# Patient Record
Sex: FEMALE | Race: WHITE | NOT HISPANIC OR LATINO | Employment: OTHER | URBAN - METROPOLITAN AREA
[De-identification: names, ages, dates, MRNs, and addresses within clinical notes are randomized per-mention and may not be internally consistent; named-entity substitution may affect disease eponyms.]

---

## 2017-11-21 ENCOUNTER — ALLIED HEALTH/NURSE VISIT (OUTPATIENT)
Dept: NURSING | Facility: CLINIC | Age: 67
End: 2017-11-21
Payer: COMMERCIAL

## 2017-11-21 ENCOUNTER — HOSPITAL ENCOUNTER (OUTPATIENT)
Dept: MAMMOGRAPHY | Facility: CLINIC | Age: 67
Discharge: HOME | End: 2017-11-21
Attending: OBSTETRICS & GYNECOLOGY | Admitting: OBSTETRICS & GYNECOLOGY
Payer: MEDICARE

## 2017-11-21 DIAGNOSIS — Z12.39 BREAST CANCER SCREENING: ICD-10-CM

## 2017-11-21 DIAGNOSIS — Z23 NEED FOR PROPHYLACTIC VACCINATION AND INOCULATION AGAINST INFLUENZA: Primary | ICD-10-CM

## 2017-11-21 PROCEDURE — G0008 ADMIN INFLUENZA VIRUS VAC: HCPCS

## 2017-11-21 PROCEDURE — 90662 IIV NO PRSV INCREASED AG IM: CPT

## 2017-11-21 PROCEDURE — 99207 ZZC NO CHARGE NURSE ONLY: CPT

## 2017-11-21 PROCEDURE — G0202 SCR MAMMO BI INCL CAD: HCPCS

## 2017-11-21 NOTE — MR AVS SNAPSHOT
"              After Visit Summary   11/21/2017    Dorie Gagnon    MRN: [id number]           Patient Information     Date Of Birth          1950        Visit Information        Provider Department      11/21/2017 9:30 AM [facility]        Today's Diagnoses     Need for prophylactic vaccination and inoculation against influenza    -  1       Follow-ups after your visit        Your next 10 appointments already scheduled     Nov 21, 2017 11:25 AM CST   MA SCREENING BILATERAL W/ NAREN with [id number]   [facility] [place] ([facility])    [address] [place] [address], [address]  [facility] [id number] [phone]           Three-dimensional (3D) mammograms are available at [place] locations in [facility], [place], [facility], [place], [place], and [place]. [facility] locations include [place] and [facility] in [place]. Benefits of 3D mammograms include: - Improved rate of cancer detection - Decreases your chance of having to go back for more tests, which means fewer: - \"False-positive\" results (This means that there is an abnormal area but it isn't cancer.) - Invasive testing procedures, such as a biopsy or surgery - Can provide clearer images of the breast if you have dense breast tissue. 3D mammography is an optional exam that anyone can have with a 2D mammogram. It doesn't replace or take the place of a 2D mammogram. 2D mammograms remain an effective screening test for all women.  Not all insurance companies cover the cost of a 3D mammogram. Check with your insurance.            Dec 06, 2017 10:30 AM CST   PHYSICAL with Zabrina Lorenzo, DO   [facility]    [address] [place] [id number] [phone]              Who to contact     If you have questions or need follow up information about today's clinic visit or your schedule please contact [facility] directly at [phone].  Normal or non-critical lab and imaging results will be communicated to you by MyChart, letter or phone within 4 business days after the clinic has received the results. If you do not hear from us within 7 days, please contact the clinic through MyChart or phone. If you have a critical or abnormal lab result, we will notify you by phone as soon as possible.  Submit refill requests through Revelation or call your pharmacy and they will forward the refill request to us. Please allow 3 business days for your refill to be completed.          Additional Information About Your Visit        [employer] Information     Revelation gives you secure access to your electronic health record. If you see a primary care provider, you can also send messages to your care team and make appointments. If you have questions, please call your primary care clinic.  If you do not have a primary care provider, please call [phone] and they will assist you.        Care EveryWhere ID     This is your Care EveryWhere ID. This could be used by other organizations to access your [facility] medical records  [id number]         Blood Pressure from Last 3 Encounters:   09/13/16 130/82   06/25/15 120/77   04/07/15 130/80    Weight from Last 3 Encounters:   09/13/16 135 lb (61.2 kg)   06/25/15 140 lb (63.5 kg)   04/07/15 138 lb (62.6 kg)              We Performed the Following     FLU VACCINE, INCREASED ANTIGEN, PRESV FREE, AGE 65+ [74650]     Vaccine Administration, Initial [09966]        Primary Care Provider Office Phone # Fax #    Zabrina Crockett DO [place] [phone] [address]  [place] [address]        Equal Access to Services     [facility] : Hadii aad tracy hadkareno Sodallas, waaxda luqadaha, qaybta kaalmada adelucyada, gerardo hendrix. So [facility] [phone].    ATENCIÓN: Si habla español, tiene a medina disposición servicios gratuitos de asistencia lingüística. Llame al  [phone].    We comply with applicable federal civil rights laws and [place] laws. We do not discriminate on the basis of race, color, national origin, age, disability, sex, sexual orientation, or gender identity.            Thank you!     Thank you for choosing [facility]  for your care. Our goal is always to provide you with excellent care. Hearing back from our patients is one way we can continue to improve our services. Please take a few minutes to complete the written survey that you may receive in the mail after your visit with us. Thank you!             Your Updated Medication List - Protect others around you: Learn how to safely use, store and throw away your medicines at www.disposemymeds.org.          This list is accurate as of: 11/21/17  9:40 AM.  Always use your most recent med list.                   Brand Name Dispense Instructions for use Diagnosis    CO Q 10 PO           KRILL OIL PO      Take 350 mg by mouth        loratadine 10 MG tablet    CLARITIN    90 tablet    Take 1 tablet by mouth daily. April though Oct    Chronic rhinitis       magnesium 100 MG Tabs      Take 400 mg by mouth        melatonin 1 MG Tabs tablet      Take 1 tablet (1 mg) by mouth At Bedtime        vitamin  s/Minerals Tabs     30    1 TABLET DAILY        vitamin B complex with vitamin C Tabs tablet      Take 1 tablet by mouth daily        VITAMIN D3 PO      Take 1,000 Units by mouth daily

## 2017-11-21 NOTE — PROGRESS NOTES

## 2017-12-06 ENCOUNTER — OFFICE VISIT (OUTPATIENT)
Dept: FAMILY MEDICINE | Facility: CLINIC | Age: 67
End: 2017-12-06
Payer: COMMERCIAL

## 2017-12-06 VITALS
OXYGEN SATURATION: 98 % | TEMPERATURE: 97.3 F | HEIGHT: 61 IN | SYSTOLIC BLOOD PRESSURE: 132 MMHG | BODY MASS INDEX: 24.55 KG/M2 | WEIGHT: 130 LBS | HEART RATE: 86 BPM | DIASTOLIC BLOOD PRESSURE: 78 MMHG

## 2017-12-06 DIAGNOSIS — M85.80 OSTEOPENIA, UNSPECIFIED LOCATION: Chronic | ICD-10-CM

## 2017-12-06 DIAGNOSIS — Z00.01 ENCOUNTER FOR PREVENTATIVE ADULT HEALTH CARE EXAM WITH ABNORMAL FINDINGS: Primary | ICD-10-CM

## 2017-12-06 DIAGNOSIS — E78.5 HYPERLIPIDEMIA, UNSPECIFIED HYPERLIPIDEMIA TYPE: Chronic | ICD-10-CM

## 2017-12-06 DIAGNOSIS — R09.89 ABDOMINAL BRUIT: ICD-10-CM

## 2017-12-06 DIAGNOSIS — Z82.49 FAMILY HISTORY OF ISCHEMIC HEART DISEASE: Chronic | ICD-10-CM

## 2017-12-06 DIAGNOSIS — R00.2 PALPITATIONS: ICD-10-CM

## 2017-12-06 DIAGNOSIS — K30 ACID INDIGESTION: ICD-10-CM

## 2017-12-06 PROCEDURE — G0439 PPPS, SUBSEQ VISIT: HCPCS | Performed by: INTERNAL MEDICINE

## 2017-12-06 RX ORDER — LORATADINE 10 MG/1
10 TABLET ORAL PRN
COMMUNITY
End: 2019-04-22

## 2017-12-06 RX ORDER — ANTIARTHRITIC COMBINATION NO.2 900 MG
1 TABLET ORAL DAILY
COMMUNITY
End: 2021-09-19

## 2017-12-06 RX ORDER — MULTIVITAMIN WITH IRON
1 TABLET ORAL DAILY
COMMUNITY
End: 2018-11-19

## 2017-12-06 RX ORDER — ANTIOX #8/OM3/DHA/EPA/LUT/ZEAX 250-2.5 MG
1 CAPSULE ORAL 2 TIMES DAILY
COMMUNITY
End: 2019-11-07

## 2017-12-06 NOTE — PATIENT INSTRUCTIONS
"  Please call to schedule your radiology study at [facility]: [phone] - the aortic ultrasound  Plan on bone density next year with mammogram (call us for the order next fall)  Work on calcium and more exercise for your bones in the meantime  Schedule future fasting labs  Let me know if you wish to have the calcium heart CT scan   Keep track of how often you feel the heart \"irregularity\" and let me know if you'd prefer more information on it with a heart monitor (ex: for a week probably based on time frame of your symptoms)  Try Tums 1-2 tablets twice per day as your extra calcium supplement instead of your current supplement and then see if it helps the indigestion/cough  Vitamin D 2,000 units per day on average  If still having discomfort/indigestion, then can try Zantac or Pepcid  Melatonin a few hours before bedtime up to max 3 mg  Nutrition and MyPlate: Dairy    The dairy group includes foods that are made from milk and are also high in calcium (a nutrient that builds strong bones). If you're lactose-intolerant, special milk products can help. If you're allergic to dairy, be sure to get your calcium from leafy greens (such as mustard or jasmin greens) and from calcium-fortified foods (such as orange juice and soy products).  Nutrient-rich choices   It's best to choose low-fat or nonfat dairy products. Nutrient-rich choices include:    Good old-fashioned milk (low-fat or nonfat). If you don't like the flavor, stir in a little chocolate syrup, or vanilla or almond extract. The nutrients in the milk outweigh the added calories.    Low-fat or nonfat cheese, cottage cheese, and yogurt.    Foods made with these products, such as cream of broccoli soup made with nonfat milk or quesadillas made with low-fat cheese.  What makes dairy less healthy?    Many dairy products are high in fat. Always look for low-fat or nonfat varieties. You can ease into this. If you drink whole milk now, make the move to 2% milk " first, then to fat-free or low-fat (1%) milk.    Most cheeses are high in fat. If you select a cheese with a strong taste, you may eat less than you would of a milder cheese. Also look for low-fat cheese or cheese made with part skim milk.    Added sugar, such as in ice cream and frozen yogurt, makes dairy products less healthy. Compare food labels to find brands lower in fat and calories.  One small change  Drink low-fat or nonfat milk with at least one meal each day. Have a better idea? Write it here:     _____________________________________________________________  Date Last Reviewed: 6/25/2015 2000-2017 [employer]. [address] [place] [address], [place] [address]. All rights reserved. This information is not intended as a substitute for professional medical care. Always follow your healthcare professional's instructions.

## 2017-12-06 NOTE — MR AVS SNAPSHOT
"              After Visit Summary   12/6/2017    Dorie Gagnon    MRN: [id number]           Patient Information     Date Of Birth          1950        Visit Information        Provider Department      12/6/2017 10:30 AM Zabrina Lorenzo,  [facility]        Today's Diagnoses     Encounter for preventative adult health care exam with abnormal findings    -  1    Osteopenia, unspecified location        Hyperlipidemia, unspecified hyperlipidemia type        Chronic rhinitis        Abdominal bruit          Care Instructions      Please call to schedule your radiology study at [facility] [place]: [phone] - the aortic ultrasound  Plan on bone density next year with mammogram (call us for the order next fall)  Work on calcium and more exercise for your bones in the meantime  Schedule future fasting labs  Let me know if you wish to have the calcium heart CT scan   Keep track of how often you feel the heart \"irregularity\" and let me know if you'd prefer more information on it with a heart monitor (ex: for a week probably based on time frame of your symptoms)  Try Tums 1-2 tablets twice per day as your extra calcium supplement instead of your current supplement and then see if it helps the indigestion/cough  Vitamin D 2,000 units per day on average  If still having discomfort/indigestion, then can try Zantac or Pepcid  Melatonin a few hours before bedtime up to max 3 mg  Nutrition and MyPlate: Dairy    The dairy group includes foods that are made from milk and are also high in calcium (a nutrient that builds strong bones). If you're lactose-intolerant, special milk products can help. If you're allergic to dairy, be sure to get your calcium from leafy greens (such as mustard or jasmin greens) and from calcium-fortified foods (such as orange juice and soy products).  Nutrient-rich choices   It's best to choose low-fat or nonfat dairy products. Nutrient-rich choices include:    Good old-fashioned milk " (low-fat or nonfat). If you don't like the flavor, stir in a little chocolate syrup, or vanilla or almond extract. The nutrients in the milk outweigh the added calories.    Low-fat or nonfat cheese, cottage cheese, and yogurt.    Foods made with these products, such as cream of broccoli soup made with nonfat milk or quesadillas made with low-fat cheese.  What makes dairy less healthy?    Many dairy products are high in fat. Always look for low-fat or nonfat varieties. You can ease into this. If you drink whole milk now, make the move to 2% milk first, then to fat-free or low-fat (1%) milk.    Most cheeses are high in fat. If you select a cheese with a strong taste, you may eat less than you would of a milder cheese. Also look for low-fat cheese or cheese made with part skim milk.    Added sugar, such as in ice cream and frozen yogurt, makes dairy products less healthy. Compare food labels to find brands lower in fat and calories.  One small change  Drink low-fat or nonfat milk with at least one meal each day. Have a better idea? Write it here:     _____________________________________________________________  Date Last Reviewed: 6/25/2015 2000-2017 [employer]. [address] [place] [address]. All rights reserved. This information is not intended as a substitute for professional medical care. Always follow your healthcare professional's instructions.                Follow-ups after your visit        Future tests that were ordered for you today     Open Future Orders        Priority Expected Expires Ordered    US abdominal aorta limited Routine  12/6/2018 12/6/2017    CBC with platelets Routine 12/7/2017 6/6/2018 12/6/2017    Comprehensive metabolic panel Routine 12/7/2017 6/6/2018 12/6/2017    Lipid panel reflex to direct LDL Fasting Routine 12/7/2017 6/6/2018 12/6/2017    Vitamin D Deficiency Routine 12/7/2017 6/6/2018 12/6/2017            Who to contact     If you have questions or need  "follow up information about today's clinic visit or your schedule please contact [facility] directly at [phone].  Normal or non-critical lab and imaging results will be communicated to you by [facility], letter or phone within 4 business days after the clinic has received the results. If you do not hear from us within 7 days, please contact the clinic through Fipeot or phone. If you have a critical or abnormal lab result, we will notify you by phone as soon as possible.  Submit refill requests through Leonar3Do or call your pharmacy and they will forward the refill request to us. Please allow 3 business days for your refill to be completed.          Additional Information About Your Visit        [employer] Information     Leonar[employer]Do gives you secure access to your electronic health record. If you see a primary care provider, you can also send messages to your care team and make appointments. If you have questions, please call your primary care clinic.  If you do not have a primary care provider, please call [phone] and they will assist you.        Care EveryWhere ID     This is your Care EveryWhere ID. This could be used by other organizations to access your [facility] medical records  [id number]        Your Vitals Were     Pulse Temperature Height Pulse Oximetry Breastfeeding? BMI (Body Mass Index)    86 97.3  F (36.3  C) (Oral) 5' 1\" (1.549 m) 98% No 24.56 kg/m2       Blood Pressure from Last 3 Encounters:   12/06/17 132/78   09/13/16 130/82   06/25/15 120/77    Weight from Last 3 Encounters:   12/06/17 130 lb (59 kg)   09/13/16 135 lb (61.2 kg)   06/25/15 140 lb (63.5 kg)                 Today's Medication Changes          These changes are accurate as of: 12/6/17 11:32 AM.  If you have any questions, ask your nurse or doctor.               These medicines have changed or have updated prescriptions.        Dose/Directions    loratadine 10 MG tablet   Commonly known as:  CLARITIN   This may have " changed:    - when to take this  - reasons to take this  - additional instructions   Used for:  Chronic rhinitis   Changed by:  Zabrina Lorenzo DO        Dose:  10 mg   Take 1 tablet (10 mg) by mouth as needed for allergies   Refills:  0                Primary Care Provider Office Phone # Fax #    Zabrina Lorenzo [phone] [address] [facility] [address]  [facility] [address]        Equal Access to Services     [facility]: Hadii aad ku hadasho Soomaali, waaxda luqadaha, qaybta kaalmada adeegyada, waxay idiin hayaan adeeg kharash la'aan . So [facility] [phone].    ATENCIÓN: Si katie nicolas, tiene a medina disposición servicios gratuitos de asistencia lingüística. [facility] [phone].    We comply with applicable federal civil rights laws and [place] laws. We do not discriminate on the basis of race, color, national origin, age, disability, sex, sexual orientation, or gender identity.            Thank you!     Thank you for choosing [facility]  for your care. Our goal is always to provide you with excellent care. Hearing back from our patients is one way we can continue to improve our services. Please take a few minutes to complete the written survey that you may receive in the mail after your visit with us. Thank you!             Your Updated Medication List - Protect others around you: Learn how to safely use, store and throw away your medicines at www.disposemymeds.org.          This list is accurate as of: 12/6/17 11:32 AM.  Always use your most recent med list.                   Brand Name Dispense Instructions for use Diagnosis    Biotin 5000 MCG Tabs      Take 1 tablet by mouth daily        Calcium-Vitamin D-Vitamin K 500-1000-40 MG-UNT-MCG Chew      Taking calcium+D gummie once daily        CO Q 10 PO           KRILL OIL PO      Take 350 mg by mouth        loratadine 10 MG tablet    CLARITIN     Take 1 tablet (10 mg) by mouth as needed for allergies    Chronic rhinitis       *  magnesium 100 MG Tabs      Take 400 mg by mouth        * magnesium 250 MG tablet      Take 1 tablet by mouth daily        melatonin 1 MG Tabs tablet      Take 1 tablet (1 mg) by mouth At Bedtime        PROBIOTIC-10 Caps      Take 1 capsule by mouth daily        * PRESERVISION AREDS 2 Caps      Take 1 capsule by mouth daily        * vitamin  s/Minerals Tabs     30    1 TABLET DAILY        vitamin B complex with vitamin C Tabs tablet      Take 1 tablet by mouth daily        VITAMIN D3 PO      Take 1,000 Units by mouth daily        * Notice:  This list has 4 medication(s) that are the same as other medications prescribed for you. Read the directions carefully, and ask your doctor or other care provider to review them with you.

## 2017-12-06 NOTE — PROGRESS NOTES
SUBJECTIVE:   Dorie Gagnon is a 67 year old female who presents for Preventive Visit.  Are you in the first 12 months of your Medicare coverage?  No    Physical   Annual:     Getting at least 3 servings of Calcium per day::  NO    Bi-annual eye exam::  Yes    Dental care twice a year::  Yes    Sleep apnea or symptoms of sleep apnea::  None    Diet::  Low salt    Frequency of exercise::  2-3 days/week    Duration of exercise::  15-30 minutes    Taking medications regularly::  Not Applicable    Medication side effects::  Not applicable    Additional concerns today::  YES      COGNITIVE SCREEN  1) Repeat 3 items (Banana, Sunrise, Chair)    2) Clock draw: NORMAL  3) 3 item recall: Recalls 3 objects  Results: 3 items recalled: COGNITIVE IMPAIRMENT LESS LIKELY    Mini-CogTM Copyright S In[employer]. Licensed by the author for use in [employer]; reprinted with permission (fely@[facility]). All rights reserved.      Dorie still follows with OBGYN Dr. Gil; not as much salad and calcium products but has 2 oral supplements  Walking and yoga ; belongs to a gym and will plan to start back at that   Less eating out and has lost 5# by trying to do so but wants to loose a bit more  Wt Readings from Last 2 Encounters:   12/06/17 130 lb (59 kg)   09/13/16 135 lb (61.2 kg)   Home BPs max 140s/80s; trying to cut back on sodium  Indigestion with occasional cough; prn Pepcid was helpful but doesn't want to be on long-term antiacid   Melatonin at night 2 mg helpful but wonders if ok to take this - I told her that yes it is ok to take  Periodic palpitations as well without associated symptoms            Social History   Substance Use Topics     Smoking status: Never Smoker     Smokeless tobacco: Never Used     Alcohol use Yes      Comment: 1 wine per day       The patient does not drink >3 drinks per day nor >7 drinks per week.      Today's PHQ-2 Score:   PHQ-2 ( 1999 [employer]) 12/6/2017   Q1: Little interest or pleasure in doing  "things 0   Q2: Feeling down, depressed or hopeless 0   PHQ-2 Score 0   Q1: Little interest or pleasure in doing things -   Q2: Feeling down, depressed or hopeless -   PHQ-2 Score -       Do you feel safe in your environment - Yes    Do you have a Health Care Directive?: No: Advance care planning reviewed with patient; information given to patient to review.      Current providers sharing in care for this patient include: Patient Care Team:  Zabrina Lorenzo DO as PCP - General (Internal Medicine)      Hearing impairment: No    Ability to successfully perform activities of daily living: Yes, no assistance needed     Fall risk:  Fallen 2 or more times in the past year?: No  Any fall with injury in the past year?: No      Home safety:  none identified      The following health maintenance items are reviewed in Epic and correct as of today:  Health Maintenance   Topic Date Due     ADVANCE DIRECTIVE PLANNING Q5 YRS  08/16/2016     FALL RISK ASSESSMENT  09/13/2017     PAP Q3 YR  06/25/2018     TETANUS IMMUNIZATION (SYSTEM ASSIGNED)  09/09/2018     MAMMO SCREEN Q2 YR (SYSTEM ASSIGNED)  11/21/2019     COLON CANCER SCREEN (SYSTEM ASSIGNED)  10/05/2020     LIPID SCREEN Q5 YR FEMALE (SYSTEM ASSIGNED)  09/15/2021     INFLUENZA VACCINE (SYSTEM ASSIGNED)  Addressed     DEXA SCAN SCREENING (SYSTEM ASSIGNED)  Completed     PNEUMOCOCCAL  Completed     HEPATITIS C SCREENING  Completed       Review of Systems  Comprehensive ROS negative unless as stated above in HPI.    OBJECTIVE:   /78  Pulse 86  Temp 97.3  F (36.3  C) (Oral)  Ht 5' 1\" (1.549 m)  Wt 130 lb (59 kg)  SpO2 98%  Breastfeeding? No  BMI 24.56 kg/m2 Estimated body mass index is 24.56 kg/(m^2) as calculated from the following:    Height as of this encounter: 5' 1\" (1.549 m).    Weight as of this encounter: 130 lb (59 kg).  Physical Exam  GENERAL APPEARANCE: healthy, alert and no distress  EYES: Eyes grossly normal to inspection, PERRL and conjunctivae and " sclerae normal  HENT: ear canals and TM's normal, nose and mouth without ulcers or lesions, oropharynx clear and oral mucous membranes moist  NECK: no adenopathy, no asymmetry, masses, or scars and thyroid normal to palpation  RESP: lungs clear to auscultation - no rales, rhonchi or wheezes  CV: HRRR with soft 2/6 systolic murmur, no peripheral edema and no carotid bruits  ABDOMEN: soft, nontender, no hepatosplenomegaly, no masses, bowel sounds normal and soft abdominal bruit noted vs radiation of murmur noted  MS: no musculoskeletal defects are noted and gait is age appropriate without ataxia  SKIN: no suspicious lesions or rashes  NEURO: Normal strength and tone, mentation intact and speech normal  PSYCH: mentation appears normal and affect normal/bright    ASSESSMENT / PLAN:   1. Encounter for preventative adult health care exam with abnormal findings  Still follows with OBGYN Dr. Gil  - CBC with platelets; Future    2. Osteopenia, unspecified location  Discussed plan on DEXA next year  See instructions below re: calcium/D and weight bearing exercises  B/c of cardiac disease in family would prefer she get calcium mostly from diet rather than supplements, however, she also has some indigestion so Tums could be helpful  - Vitamin D Deficiency; Future    3. Hyperlipidemia, unspecified hyperlipidemia type  Strong family h/o cardiac disease; normal CRP last year  Was on pravastatin in the past from about 4379-7554 and then switched to diet control  We discussed the coronary calcium CT score and she'd like to consider that  - Comprehensive metabolic panel; Future  - Lipid panel reflex to direct LDL Fasting; Future    4. Abdominal bruit  Bruit vs radiated soft murmur?  - US abdominal aorta limited; Future    5. Family history of ischemic heart disease  Discussed as above    6. Palpitations  She will pay more attention; likely benign rhythm but surely could order a monitor    7. Acid indigestion  See instructions  "below    End of Life Planning:  Patient currently has an advanced directive: No.  I have verified the patient's ablity to prepare an advanced directive/make health care decisions.  Literature was provided to assist patient in preparing an advanced directive.    COUNSELING:  Reviewed preventive health counseling, as reflected in patient instructions       Regular exercise       Healthy diet/nutrition  Estimated body mass index is 24.56 kg/(m^2) as calculated from the following:    Height as of this encounter: 5' 1\" (1.549 m).    Weight as of this encounter: 130 lb (59 kg).   reports that she has never smoked. She has never used smokeless tobacco.        Appropriate preventive services were discussed with this patient, including applicable screening as appropriate for cardiovascular disease, diabetes, osteopenia/osteoporosis, and glaucoma.  As appropriate for age/gender, discussed screening for colorectal cancer, prostate cancer, breast cancer, and cervical cancer. Checklist reviewing preventive services available has been given to the patient.    Reviewed patients plan of care and provided an AVS. The Intermediate Care Plan ( asthma action plan, low back pain action plan, and migraine action plan) for Dorie meets the Care Plan requirement. This Care Plan has been established and reviewed with the Patient.    Patient Instructions     Please call to schedule your radiology study at [facility]: [phone] - the aortic ultrasound  Plan on bone density next year with mammogram (call us for the order next fall)  Work on calcium and more exercise for your bones in the meantime  Schedule future fasting labs  Let me know if you wish to have the calcium heart CT scan   Keep track of how often you feel the heart \"irregularity\" and let me know if you'd prefer more information on it with a heart monitor (ex: for a week probably based on time frame of your symptoms)  Try Tums 1-2 tablets twice per day as your extra " calcium supplement instead of your current supplement and then see if it helps the indigestion/cough  Vitamin D 2,000 units per day on average  If still having discomfort/indigestion, then can try Zantac or Pepcid  Melatonin a few hours before bedtime up to max 3 mg  Nutrition and MyPlate: Dairy    The dairy group includes foods that are made from milk and are also high in calcium (a nutrient that builds strong bones). If you're lactose-intolerant, special milk products can help. If you're allergic to dairy, be sure to get your calcium from leafy greens (such as mustard or jasmin greens) and from calcium-fortified foods (such as orange juice and soy products).  Nutrient-rich choices   It's best to choose low-fat or nonfat dairy products. Nutrient-rich choices include:    Good old-fashioned milk (low-fat or nonfat). If you don't like the flavor, stir in a little chocolate syrup, or vanilla or almond extract. The nutrients in the milk outweigh the added calories.    Low-fat or nonfat cheese, cottage cheese, and yogurt.    Foods made with these products, such as cream of broccoli soup made with nonfat milk or quesadillas made with low-fat cheese.  What makes dairy less healthy?    Many dairy products are high in fat. Always look for low-fat or nonfat varieties. You can ease into this. If you drink whole milk now, make the move to 2% milk first, then to fat-free or low-fat (1%) milk.    Most cheeses are high in fat. If you select a cheese with a strong taste, you may eat less than you would of a milder cheese. Also look for low-fat cheese or cheese made with part skim milk.    Added sugar, such as in ice cream and frozen yogurt, makes dairy products less healthy. Compare food labels to find brands lower in fat and calories.  One small change  Drink low-fat or nonfat milk with at least one meal each day. Have a better idea? Write it here:     _____________________________________________________________  Date Last  Reviewed: 6/25/2015 2000-2017 [employer]. [address] [place] [address], [place] [address]. All rights reserved. This information is not intended as a substitute for professional medical care. Always follow your healthcare professional's instructions.              Zabrina Lorenzo,   [facility]

## 2017-12-06 NOTE — NURSING NOTE
"Chief Complaint   Patient presents with     Wellness Visit       Initial /78  Pulse 86  Temp 97.3  F (36.3  C) (Oral)  Ht 5' 1\" (1.549 m)  Wt 130 lb (59 kg)  SpO2 98%  Breastfeeding? No  BMI 24.56 kg/m2 Estimated body mass index is 24.56 kg/(m^2) as calculated from the following:    Height as of this encounter: 5' 1\" (1.549 m).    Weight as of this encounter: 130 lb (59 kg).  Medication Reconciliation: complete     Thony Colorado CMA     "

## 2017-12-07 DIAGNOSIS — Z00.01 ENCOUNTER FOR PREVENTATIVE ADULT HEALTH CARE EXAM WITH ABNORMAL FINDINGS: ICD-10-CM

## 2017-12-07 DIAGNOSIS — M85.80 OSTEOPENIA, UNSPECIFIED LOCATION: Chronic | ICD-10-CM

## 2017-12-07 DIAGNOSIS — E78.5 HYPERLIPIDEMIA, UNSPECIFIED HYPERLIPIDEMIA TYPE: Chronic | ICD-10-CM

## 2017-12-07 LAB
ALBUMIN SERPL-MCNC: 4.2 G/DL (ref 3.4–5)
ALP SERPL-CCNC: 53 U/L (ref 40–150)
ALT SERPL W P-5'-P-CCNC: 29 U/L (ref 0–50)
ANION GAP SERPL CALCULATED.3IONS-SCNC: 10 MMOL/L (ref 3–14)
AST SERPL W P-5'-P-CCNC: 24 U/L (ref 0–45)
BILIRUB SERPL-MCNC: 0.6 MG/DL (ref 0.2–1.3)
BUN SERPL-MCNC: 12 MG/DL (ref 7–30)
CALCIUM SERPL-MCNC: 9.3 MG/DL (ref 8.5–10.1)
CHLORIDE SERPL-SCNC: 103 MMOL/L (ref 94–109)
CHOLEST SERPL-MCNC: 275 MG/DL
CO2 SERPL-SCNC: 24 MMOL/L (ref 20–32)
CREAT SERPL-MCNC: 0.72 MG/DL (ref 0.52–1.04)
DEPRECATED CALCIDIOL+CALCIFEROL SERPL-MC: 70 UG/L (ref 20–75)
ERYTHROCYTE [DISTWIDTH] IN BLOOD BY AUTOMATED COUNT: 13.8 % (ref 10–15)
GFR SERPL CREATININE-BSD FRML MDRD: 81 ML/MIN/1.7M2
GLUCOSE SERPL-MCNC: 93 MG/DL (ref 70–99)
HCT VFR BLD AUTO: 39 % (ref 35–47)
HDLC SERPL-MCNC: 84 MG/DL
HGB BLD-MCNC: 13.3 G/DL (ref 11.7–15.7)
LDLC SERPL CALC-MCNC: 173 MG/DL
MCH RBC QN AUTO: 31.3 PG (ref 26.5–33)
MCHC RBC AUTO-ENTMCNC: 34.1 G/DL (ref 31.5–36.5)
MCV RBC AUTO: 92 FL (ref 78–100)
NONHDLC SERPL-MCNC: 191 MG/DL
PLATELET # BLD AUTO: 292 10E9/L (ref 150–450)
POTASSIUM SERPL-SCNC: 4 MMOL/L (ref 3.4–5.3)
PROT SERPL-MCNC: 7.5 G/DL (ref 6.8–8.8)
RBC # BLD AUTO: 4.25 10E12/L (ref 3.8–5.2)
SODIUM SERPL-SCNC: 137 MMOL/L (ref 133–144)
TRIGL SERPL-MCNC: 92 MG/DL
WBC # BLD AUTO: 5 10E9/L (ref 4–11)

## 2017-12-07 PROCEDURE — 85027 COMPLETE CBC AUTOMATED: CPT | Performed by: INTERNAL MEDICINE

## 2017-12-07 PROCEDURE — 80061 LIPID PANEL: CPT | Performed by: INTERNAL MEDICINE

## 2017-12-07 PROCEDURE — 36415 COLL VENOUS BLD VENIPUNCTURE: CPT | Performed by: INTERNAL MEDICINE

## 2017-12-07 PROCEDURE — 82306 VITAMIN D 25 HYDROXY: CPT | Performed by: INTERNAL MEDICINE

## 2017-12-07 PROCEDURE — 80053 COMPREHEN METABOLIC PANEL: CPT | Performed by: INTERNAL MEDICINE

## 2017-12-08 ENCOUNTER — HOSPITAL ENCOUNTER (OUTPATIENT)
Dept: ULTRASOUND IMAGING | Facility: CLINIC | Age: 67
Discharge: HOME | End: 2017-12-08
Attending: INTERNAL MEDICINE | Admitting: INTERNAL MEDICINE
Payer: MEDICARE

## 2017-12-08 DIAGNOSIS — R09.89 ABDOMINAL BRUIT: ICD-10-CM

## 2017-12-08 PROBLEM — R00.2 PALPITATIONS: Status: ACTIVE | Noted: 2017-12-08

## 2017-12-08 PROBLEM — K30 ACID INDIGESTION: Status: ACTIVE | Noted: 2017-12-08

## 2017-12-08 PROCEDURE — 76775 US EXAM ABDO BACK WALL LIM: CPT

## 2018-04-13 ENCOUNTER — MYC MEDICAL ADVICE (OUTPATIENT)
Dept: FAMILY MEDICINE | Facility: CLINIC | Age: 68
End: 2018-04-13

## 2018-04-13 DIAGNOSIS — E78.5 HYPERLIPIDEMIA, UNSPECIFIED HYPERLIPIDEMIA TYPE: Primary | Chronic | ICD-10-CM

## 2018-04-13 NOTE — TELEPHONE ENCOUNTER
Dr. Lorenzo,     Please see message below from patient regarding statin medication.    Lucy Lovett RN

## 2018-04-15 RX ORDER — PRAVASTATIN SODIUM 40 MG
40 TABLET ORAL DAILY
Qty: 90 TABLET | Refills: 1 | Status: SHIPPED | OUTPATIENT
Start: 2018-04-15 | End: 2018-10-23

## 2018-08-25 ENCOUNTER — HEALTH MAINTENANCE LETTER (OUTPATIENT)
Age: 68
End: 2018-08-25

## 2018-10-23 DIAGNOSIS — E78.5 HYPERLIPIDEMIA, UNSPECIFIED HYPERLIPIDEMIA TYPE: Chronic | ICD-10-CM

## 2018-10-23 NOTE — TELEPHONE ENCOUNTER
"Last Written Prescription Date:  4/15/18  Last Fill Quantity: 90,  # refills: 1   Last office visit: 12/6/2017 with prescribing provider:     Future Office Visit:   Next 5 appointments (look out 90 days)     Nov 19, 2018  1:00 PM [facility] Physical Adult with Zabrina Lorenzo,    [facility]    [address] [facility] [address]   [phone]                 Requested Prescriptions   Pending Prescriptions Disp Refills     pravastatin (PRAVACHOL) 40 MG tablet [Pharmacy Med Name: PRAVASTATIN 40MG TABLETS] 90 tablet 0     Sig: TAKE 1 TABLET(40 MG) BY MOUTH DAILY    Statins Protocol Passed    10/23/2018  1:05 PM       Passed - LDL on file in past 12 months    Recent Labs   Lab Test  12/07/17   0854   LDL  173*            Passed - No abnormal creatine kinase in past 12 months    No lab results found.            Passed - Recent (12 mo) or future (30 days) visit within the authorizing provider's specialty    Patient had office visit in the last 12 months or has a visit in the next 30 days with authorizing provider or within the authorizing provider's specialty.  See \"Patient Info\" tab in inbasket, or \"Choose Columns\" in Meds & Orders section of the refill encounter.             Passed - Patient is age 18 or older       Passed - No active pregnancy on record       Passed - No positive pregnancy test in past 12 months          "

## 2018-10-24 RX ORDER — PRAVASTATIN SODIUM 40 MG
TABLET ORAL
Qty: 90 TABLET | Refills: 0 | Status: SHIPPED | OUTPATIENT
Start: 2018-10-24 | End: 2018-11-29

## 2018-10-24 NOTE — TELEPHONE ENCOUNTER
Pres[place] per [facility] [place].      Caren Oliva, BS, RN, PHN

## 2018-11-09 ENCOUNTER — MYC MEDICAL ADVICE (OUTPATIENT)
Dept: FAMILY MEDICINE | Facility: CLINIC | Age: 68
End: 2018-11-09

## 2018-11-09 DIAGNOSIS — Z78.0 ASYMPTOMATIC MENOPAUSAL STATE: ICD-10-CM

## 2018-11-09 DIAGNOSIS — M85.80 OSTEOPENIA, UNSPECIFIED LOCATION: Primary | Chronic | ICD-10-CM

## 2018-11-19 ENCOUNTER — OFFICE VISIT (OUTPATIENT)
Dept: FAMILY MEDICINE | Facility: CLINIC | Age: 68
End: 2018-11-19
Payer: COMMERCIAL

## 2018-11-19 ENCOUNTER — HOSPITAL ENCOUNTER (OUTPATIENT)
Dept: BONE DENSITY | Facility: CLINIC | Age: 68
End: 2018-11-19
Attending: INTERNAL MEDICINE
Payer: MEDICARE

## 2018-11-19 ENCOUNTER — HOSPITAL ENCOUNTER (OUTPATIENT)
Dept: MAMMOGRAPHY | Facility: CLINIC | Age: 68
Discharge: HOME | End: 2018-11-19
Attending: INTERNAL MEDICINE | Admitting: INTERNAL MEDICINE
Payer: MEDICARE

## 2018-11-19 VITALS
DIASTOLIC BLOOD PRESSURE: 72 MMHG | HEIGHT: 61 IN | TEMPERATURE: 97.3 F | HEART RATE: 76 BPM | OXYGEN SATURATION: 97 % | WEIGHT: 120.2 LBS | SYSTOLIC BLOOD PRESSURE: 134 MMHG | BODY MASS INDEX: 22.69 KG/M2

## 2018-11-19 DIAGNOSIS — M85.80 OSTEOPENIA, UNSPECIFIED LOCATION: Chronic | ICD-10-CM

## 2018-11-19 DIAGNOSIS — M19.90 ARTHRITIS: ICD-10-CM

## 2018-11-19 DIAGNOSIS — Z82.49 FAMILY HISTORY OF ISCHEMIC HEART DISEASE: Chronic | ICD-10-CM

## 2018-11-19 DIAGNOSIS — Z12.31 VISIT FOR SCREENING MAMMOGRAM: ICD-10-CM

## 2018-11-19 DIAGNOSIS — Z78.0 ASYMPTOMATIC MENOPAUSAL STATE: ICD-10-CM

## 2018-11-19 DIAGNOSIS — Z00.00 MEDICARE ANNUAL WELLNESS VISIT, SUBSEQUENT: Primary | ICD-10-CM

## 2018-11-19 DIAGNOSIS — E78.5 HYPERLIPIDEMIA, UNSPECIFIED HYPERLIPIDEMIA TYPE: Chronic | ICD-10-CM

## 2018-11-19 DIAGNOSIS — R00.2 PALPITATIONS: ICD-10-CM

## 2018-11-19 PROCEDURE — 99397 PER PM REEVAL EST PAT 65+ YR: CPT | Performed by: INTERNAL MEDICINE

## 2018-11-19 PROCEDURE — 77085 DXA BONE DENSITY AXL VRT FX: CPT

## 2018-11-19 PROCEDURE — 77067 SCR MAMMO BI INCL CAD: CPT

## 2018-11-19 PROCEDURE — 99214 OFFICE O/P EST MOD 30 MIN: CPT | Mod: 25 | Performed by: INTERNAL MEDICINE

## 2018-11-19 RX ORDER — PRAVASTATIN SODIUM 40 MG
TABLET ORAL
Qty: 90 TABLET | Refills: 3 | Status: CANCELLED | OUTPATIENT
Start: 2018-11-19

## 2018-11-19 ASSESSMENT — ACTIVITIES OF DAILY LIVING (ADL): CURRENT_FUNCTION: NO ASSISTANCE NEEDED

## 2018-11-19 NOTE — PATIENT INSTRUCTIONS
Schedule future fasting labs    Mammogram and bone density today    Restart home exercises to see if helps hips    Trial of any brand glucosamine-chondroitin to see if helps joints    Let me know if want formal physical therapy     Schedule heart monitor     Schedule with podiatrist in our clinic regarding left sided bunion and hammer toe    Goal blood pressure <130/80 overall    Shingrix  is:  1) Recommended for healthy adults aged 50 years and older to help prevent shingles and its related complications such as pain related to the shingles virus  2) Recommended for adults who previously received the previous shingles vaccine (Zostavax )  3) The preferred vaccine for helping to prevent shingles and its related complications  4) It is a series of TWO vaccines with the second dose administered between 2-6 months after the first dose in this series  5) PLEASE CHECK COST WITH YOUR INSURANCE COMPANY OR YOUR LOCAL PHARMACY AS EACH DOSE MAY BE AS MUCH AS APPROXIMATELY $300 IF NOT COVERED BY INSURANCE      Preventive Health Recommendations  See your health care provider every year to    Review health changes.     Discuss preventive care.      Review your medicines if your doctor has prescribed any.      You no longer need a yearly Pap test unless you've had an abnormal Pap test in the past 10 years. If you have vaginal symptoms, such as bleeding or discharge, be sure to talk with your provider about a Pap test.      Every 1 to 2 years, have a mammogram.  If you are over 69, talk with your health care provider about whether or not you want to continue having screening mammograms.      Every 10 years, have a colonoscopy. Or, have a yearly FIT test (stool test). These exams will check for colon cancer.       Have a cholesterol test every 5 years, or more often if your doctor advises it.       Have a diabetes test (fasting glucose) every three years. If you are at risk for diabetes, you should have this test more often.        At age 65, have a bone density scan (DEXA) to check for osteoporosis (brittle bone disease).    Shots:    Get a flu shot each year.    Get a tetanus shot every 10 years.    Talk to your doctor about your pneumonia vaccines. There are now two you should receive - Pneumovax (PPSV 23) and Prevnar (PCV 13).    Talk to your pharmacist about the shingles vaccine.    Talk to your doctor about the hepatitis B vaccine.    Nutrition:     Eat at least 5 servings of fruits and vegetables each day.      Eat whole-grain bread, whole-wheat pasta and brown rice instead of white grains and rice.      Get adequate Calcium and Vitamin D.     Lifestyle    Exercise at least 150 minutes a week (30 minutes a day, 5 days a week). This will help you control your weight and prevent disease.      Limit alcohol to one drink per day.      No smoking.       Wear sunscreen to prevent skin cancer.       See your dentist twice a year for an exam and cleaning.      See your eye doctor every 1 to 2 years to screen for conditions such as glaucoma, macular degeneration and cataracts.    Personalized Prevention Plan  You are due for the preventive services outlined below.  Your care team is available to assist you in scheduling these services.  If you have already completed any of these items, please share that information with your care team to update in your medical record.  Health Maintenance Due   Topic Date Due     Discuss Advance Directive Planning  08/16/2016     Pap Smear - every 3 years  06/25/2018     Flu Vaccine (1) 09/01/2018     Tetanus Vaccine - every 10 years  09/09/2018     FALL RISK ASSESSMENT  12/06/2018     Depression Assessment 2 - yearly  12/06/2018

## 2018-11-19 NOTE — PROGRESS NOTES
"    SUBJECTIVE:   Dorie Gagnon is a 68 year old female who presents for Preventive Visit.  Are you in the first 12 months of your Medicare Part B coverage?  No    Physical Health:  In general, how would you rate your overall physical health?: good  Frequency of exercise:: 2-3 days/week --> some home exercises, yoga, walking in the summer months  Do you usually eat at least 4 servings of fruit and vegetables a day, include whole grains & fiber, and avoid regularly eating high fat or \"junk\" foods? : Yes  Taking medications regularly:: Yes  Medication side effects:: Muscle aches (minor)  Activities of Daily Living: no assistance needed  Home safety: no safety concerns identified  Hearing Impairment:: no hearing concerns  In the past 6 months, have you been bothered by leaking of urine?: No  In general, how would you rate your overall mental or emotional health?: excellent  Additional concerns today:: Yes  PHQ-2 Score: 0  Duration of exercise:: 15-30 minutes      Additional concerns to address?  Remains on Pravastatin for cholesterol. Does see OBGYN Dr. Gil. Will be having DEXA and mammogram later today. Still having daily palpitations lasting about 30 seconds and \"feels fast and like I miss a beat\". Strong cardiac history in the family. Not sure if she has any associated symptoms. Has arthritis in hips (mild per pelvic xray in 2015) and now some pain in right MCPs and CMC. She wonders about RA and OA. Has pain in lateral hips and groin. Did go to physical therapy a few years ago for her hips and wants to retry that home therapy first before revisiting formal outpatient physical therapy. Hasn't taken over-the-counter medications for hands regularly but sometimes takes ES Tylenol. Has left foot bunion and 2nd hammer toe. S/p bunionectomy and hammer toe surgery on right in the past. Melatonin does help. Feels better with Weight Watcher's!!!!      Fall risk:      Fallen 2 or more times in the past year?: No  Any fall " "with injury in the past year?: No      COGNITIVE SCREEN  1) Repeat 3 items (Leader, Season, Table)    2) Clock draw: NORMAL  3) 3 item recall: Recalls 3 objects  Results: 3 items recalled: COGNITIVE IMPAIRMENT LESS LIKELY    Mini-CogTM Copyright S Ines. Licensed by the author for use in [facility]; reprinted with permission (fely@[facility]). All rights reserved.          Meds        Social History   Substance Use Topics     Smoking status: Never Smoker     Smokeless tobacco: Never Used     Alcohol use Yes      Comment: 1 wine per day                             Do you feel safe in your environment - Yes    Do you have a Health Care Directive?: No: Advance care planning reviewed with patient; information given to patient to review.    Current providers sharing in care for this patient include:   Patient Care Team:  Zabrina Lorenzo DO as PCP - General (Internal Medicine)    The following health maintenance items are reviewed in Epic and correct as of today:  Health Maintenance   Topic Date Due     ADVANCE DIRECTIVE PLANNING Q5 YRS  08/16/2016     PAP Q3 YR  06/25/2018     FALL RISK ASSESSMENT  12/06/2018     PHQ-2 Q1 YR  11/19/2019     MAMMO SCREEN Q2 YR (SYSTEM ASSIGNED)  11/21/2019     COLON CANCER SCREEN (SYSTEM ASSIGNED)  10/05/2020     LIPID SCREEN Q5 YR FEMALE (SYSTEM ASSIGNED)  12/07/2022     TETANUS IMMUNIZATION (SYSTEM ASSIGNED)  01/18/2028     DEXA SCAN SCREENING (SYSTEM ASSIGNED)  Completed     PNEUMOCOCCAL  Completed     INFLUENZA VACCINE  Completed     HEPATITIS C SCREENING  Completed       ROS:  Comprehensive ROS negative unless as stated above in HPI.     OBJECTIVE:   /72  Pulse 76  Temp 97.3  F (36.3  C) (Oral)  Ht 5' 1\" (1.549 m)  Wt 120 lb 3.2 oz (54.5 kg)  SpO2 97%  BMI 22.71 kg/m2 Estimated body mass index is 22.71 kg/(m^2) as calculated from the following:    Height as of this encounter: 5' 1\" (1.549 m).    Weight as of this encounter: 120 lb 3.2 oz (54.5 kg).  EXAM: "   GENERAL APPEARANCE: healthy, alert and no distress  EYES: Eyes grossly normal to inspection, PERRL and conjunctivae and sclerae normal  HENT: ear canals and TM's normal, nose and mouth without ulcers or lesions, oropharynx clear and oral mucous membranes moist  NECK: no adenopathy, no asymmetry, masses, or scars and thyroid normal to palpation  RESP: lungs clear to auscultation - no rales, rhonchi or wheezes  BREAST: normal without masses, tenderness or nipple discharge and no palpable axillary masses or adenopathy  CV: regular rate and rhythm, normal S1 S2, no S3 or S4, no murmur, click or rub, no peripheral edema and no carotid bruits  ABDOMEN: soft, nontender, no hepatosplenomegaly, no masses and bowel sounds normal, no abdominal bruits  MS: minor right CMC arthritis and right 2nd and 3rd finger bony hypertrophy, Left great toe bunion with left 2nd hammer toe, hips move well on int/ext ROM  SKIN: no suspicious lesions or rashes; some SKs on back  NEURO: Normal strength and tone, mentation intact and speech normal  PSYCH: mentation appears normal and affect normal/bright    Wt Readings from Last 4 Encounters:   11/19/18 120 lb 3.2 oz (54.5 kg)   12/06/17 130 lb (59 kg)   09/13/16 135 lb (61.2 kg)   06/25/15 140 lb (63.5 kg)     ASSESSMENT / PLAN:   1. Medicare annual wellness visit, subsequent  Still follows with OBGYN Dr. Gil  Come back for fasting labs, mammo, dexa    2. Hyperlipidemia, unspecified hyperlipidemia type  Strong family h/o CAD; tolerating Pravastatin again  - Comprehensive metabolic panel; Future  - Lipid panel reflex to direct LDL Fasting; Future  - CK total; Future  - pravastatin (PRAVACHOL) 40 MG tablet; TAKE 1 TABLET(40 MG) BY MOUTH DAILY  Dispense: 90 tablet; Refill: 3    3. Family history of ischemic heart disease  As noted above; she has done a great job on diet/exercise this year with weight loss and feels great  Baseline normal stress test in 2010  Wt Readings from Last 4 Encounters:  "  11/19/18 54.5 kg (120 lb 3.2 oz)   12/06/17 59 kg (130 lb)   09/13/16 61.2 kg (135 lb)   06/25/15 63.5 kg (140 lb)       4. Palpitations  ADDENDUM: Zio showed as anticipated, benign infrequent PVCs  - Hemoglobin; Future  - Zio Patch Holter; Future    5. Osteopenia, unspecified location  Due for dexa  - Vitamin D Deficiency; Future    6. Arthritis  Labs to be completed though suspect more OA than RA  Home exercises discussed  - Rheumatoid factor; Future  - Cyclic Citrullinated Peptide Antibody IgG; Future    End of Life Planning:  Patient currently has an advanced directive: No.  I have verified the patient's ablity to prepare an advanced directive/make health care decisions.  Literature was provided to assist patient in preparing an advanced directive.    COUNSELING:  Reviewed preventive health counseling, as reflected in patient instructions       Regular exercise       Healthy diet/nutrition    BP Readings from Last 1 Encounters:   11/19/18 134/72     Estimated body mass index is 22.71 kg/(m^2) as calculated from the following:    Height as of this encounter: 5' 1\" (1.549 m).    Weight as of this encounter: 120 lb 3.2 oz (54.5 kg).     reports that she has never smoked. She has never used smokeless tobacco.      Appropriate preventive services were discussed with this patient, including applicable screening as appropriate for cardiovascular disease, diabetes, osteopenia/osteoporosis, and glaucoma.  As appropriate for age/gender, discussed screening for colorectal cancer, prostate cancer, breast cancer, and cervical cancer. Checklist reviewing preventive services available has been given to the patient.    Reviewed patients plan of care and provided an AVS. The Intermediate Care Plan ( asthma action plan, low back pain action plan, and migraine action plan) for Dorie meets the Care Plan requirement. This Care Plan has been established and reviewed with the Patient.    Patient Instructions     Schedule future " fasting labs    Mammogram and bone density today    Restart home exercises to see if helps hips    Trial of any brand glucosamine-chondroitin to see if helps joints    Let me know if want formal physical therapy     Schedule heart monitor     Schedule with podiatrist in our clinic regarding left sided bunion and hammer toe    Goal blood pressure <130/80 overall    Shingrix  is:  1) Recommended for healthy adults aged 50 years and older to help prevent shingles and its related complications such as pain related to the shingles virus  2) Recommended for adults who previously received the previous shingles vaccine (Zostavax )  3) The preferred vaccine for helping to prevent shingles and its related complications  4) It is a series of TWO vaccines with the second dose administered between 2-6 months after the first dose in this series  5) PLEASE CHECK COST WITH YOUR INSURANCE COMPANY OR YOUR LOCAL PHARMACY AS EACH DOSE MAY BE AS MUCH AS APPROXIMATELY $300 IF NOT COVERED BY INSURANCE      MDM: Additional time outside of preventative health care spent discussing recurrent palpitations and joint pains with evaluation noted above.    Zabrina Lorenzo,   [facility]

## 2018-11-19 NOTE — MR AVS SNAPSHOT
After Visit Summary   11/19/2018    Dorie Gagnon    MRN: [id number]           Patient Information     Date Of Birth          1950        Visit Information        Provider Department      11/19/2018 1:00 PM Zabrina Lorenzo,  [facility]        Today's Diagnoses     Medicare annual wellness visit, subsequent    -  1    Hyperlipidemia, unspecified hyperlipidemia type        Family history of ischemic heart disease        Palpitations        Osteopenia, unspecified location        Arthritis          Care Instructions    Schedule future fasting labs    Mammogram and bone density today    Restart home exercises to see if helps hips    Trial of any brand glucosamine-chondroitin to see if helps joints    Let me know if want formal physical therapy     Schedule heart monitor     Schedule with podiatrist in our clinic regarding left sided bunion and hammer toe    Goal blood pressure <130/80 overall    Shingrix  is:  1) Recommended for healthy adults aged 50 years and older to help prevent shingles and its related complications such as pain related to the shingles virus  2) Recommended for adults who previously received the previous shingles vaccine (Zostavax )  3) The preferred vaccine for helping to prevent shingles and its related complications  4) It is a series of TWO vaccines with the second dose administered between 2-6 months after the first dose in this series  5) PLEASE CHECK COST WITH YOUR INSURANCE COMPANY OR YOUR LOCAL PHARMACY AS EACH DOSE MAY BE AS MUCH AS APPROXIMATELY $300 IF NOT COVERED BY INSURANCE      Preventive Health Recommendations  See your health care provider every year to    Review health changes.     Discuss preventive care.      Review your medicines if your doctor has prescribed any.      You no longer need a yearly Pap test unless you've had an abnormal Pap test in the past 10 years. If you have vaginal symptoms, such as bleeding or discharge, be sure to  talk with your provider about a Pap test.      Every 1 to 2 years, have a mammogram.  If you are over 69, talk with your health care provider about whether or not you want to continue having screening mammograms.      Every 10 years, have a colonoscopy. Or, have a yearly FIT test (stool test). These exams will check for colon cancer.       Have a cholesterol test every 5 years, or more often if your doctor advises it.       Have a diabetes test (fasting glucose) every three years. If you are at risk for diabetes, you should have this test more often.       At age 65, have a bone density scan (DEXA) to check for osteoporosis (brittle bone disease).    Shots:    Get a flu shot each year.    Get a tetanus shot every 10 years.    Talk to your doctor about your pneumonia vaccines. There are now two you should receive - Pneumovax (PPSV 23) and Prevnar (PCV 13).    Talk to your pharmacist about the shingles vaccine.    Talk to your doctor about the hepatitis B vaccine.    Nutrition:     Eat at least 5 servings of fruits and vegetables each day.      Eat whole-grain bread, whole-wheat pasta and brown rice instead of white grains and rice.      Get adequate Calcium and Vitamin D.     Lifestyle    Exercise at least 150 minutes a week (30 minutes a day, 5 days a week). This will help you control your weight and prevent disease.      Limit alcohol to one drink per day.      No smoking.       Wear sunscreen to prevent skin cancer.       See your dentist twice a year for an exam and cleaning.      See your eye doctor every 1 to 2 years to screen for conditions such as glaucoma, macular degeneration and cataracts.    Personalized Prevention Plan  You are due for the preventive services outlined below.  Your care team is available to assist you in scheduling these services.  If you have already completed any of these items, please share that information with your care team to update in your medical record.  Health Maintenance Due    Topic Date Due     Discuss Advance Directive Planning  08/16/2016     Pap Smear - every 3 years  06/25/2018     Flu Vaccine (1) 09/01/2018     Tetanus Vaccine - every 10 years  09/09/2018     FALL RISK ASSESSMENT  12/06/2018     Depression Assessment 2 - yearly  12/06/2018             Follow-ups after your visit        Your next 10 appointments already scheduled     Nov 19, 2018  2:30 PM CST   MA SCREENING BILATERAL W/ NAREN with [id number]   [facility] ([facility])    [address] [place] [address], [address] [place] [id number] [phone]           How do I prepare for my exam? (Food and drink instructions) No Food and Drink Restrictions.  How do I prepare for my exam? (Other instructions) Do not use any powder, lotion or deodorant under your arms or on your breast. If you do, we will ask you to remove it before your exam.  What should I wear: Wear comfortable, two-piece clothing.  How long does the exam take: Most scans will take 15 minutes.  What should I bring: Bring any previous mammograms from other facilities or have them mailed to the breast center.  Do I need a :  No  is needed.  What do I need to tell my doctor: If you have any allergies, tell your care team.  What should I do after the exam: No restrictions, You may resume normal activities.  What is this test: This test is an x-ray of the breast to look for breast disease. The breast is pressed between two plates to flatten and spread the tissue. An X-ray is taken of the breast from different angles.  Who should I call with questions: If you have any questions, please call the Imaging Department where you will have your exam. Directions, parking instructions, and other information is available on our website, [email or web address].  Other information about my exam Three-dimensional (3D) mammograms are available at [facility] locations in [facility], [place], [place], [facility], [place], and  "[place].  Health locations include [place] and the [facility] in [place].  Benefits of 3D mammograms include: * Improved rate of cancer detection * Decreases your chance of having to go back for more tests, which means fewer: * \"False-positive\" results (This means that there is an abnormal area but it isn't cancer.) * Invasive testing procedures, such as a biopsy or surgery * Can provide clearer images of the breast if you have dense breast tissue.  *3D mammography is an optional exam that anyone can have with a 2D mammogram. It doesn't replace or take the place of a 2D mammogram. 2D mammograms remain an effective screening test for all women.  Not all insurance companies cover the cost of a 3D mammogram. Check with your insurance.            Nov 19, 2018  2:45 PM CST   DX HIP/PELVIS/SPINE with [id number]   [facility] ([facility] [place])    [address] [place] [id number] [phone]           How do I prepare for my exam? (Food and drink instructions) No Food and Drink Restrictions.  How do I prepare for my exam? (Other instructions) Please do not take any of the following 24 hours prior to the day of your exam: vitamins, calcium tablets, antacids.  What should I wear: If possible, please wear clothes without metal (snaps, zippers). A sweat suit works well.  How long does the exam take: The exam takes about 20 minutes.  What should I bring: Bring a list of your current medicines to your exam (including vitamins, minerals and over-the-counter drugs).  Do I need a :  No  is needed.  What should I do after the exam: No restrictions, You may resume normal activities.  How do I prepare for my exam? (Food and drink instructions) A DEXA scan is a bone-density scan. It uses a low level of radiation to check the strength of your bones. As you lie on a padded table, a machine will take X-rays. We most often scan the hips and lower spine.  " Who should I call with questions: If you have any questions, please call the Imaging Department where you will have your exam. Directions, parking instructions, and other information is available on our website, [email or web address].              Future tests that were ordered for you today     Open Future Orders        Priority Expected Expires Ordered    Comprehensive metabolic panel Routine 11/20/2018 1/19/2019 11/19/2018    Lipid panel reflex to direct LDL Fasting Routine 11/20/2018 1/19/2019 11/19/2018    Hemoglobin Routine 11/20/2018 1/19/2019 11/19/2018    Vitamin D Deficiency Routine 11/20/2018 1/19/2019 11/19/2018    CK total Routine 11/20/2018 1/19/2019 11/19/2018    Zio Patch Holter Routine  1/3/2019 11/19/2018    Rheumatoid factor Routine 11/20/2018 1/19/2019 11/19/2018    Cyclic Citrullinated Peptide Antibody IgG Routine 11/20/2018 1/19/2019 11/19/2018            Who to contact     If you have questions or need follow up information about today's clinic visit or your schedule please contact [facility] directly at [phone].  Normal or non-critical lab and imaging results will be communicated to you by MyChart, letter or phone within 4 business days after the clinic has received the results. If you do not hear from us within 7 days, please contact the clinic through [facility] or phone. If you have a critical or abnormal lab result, we will notify you by phone as soon as possible.  Submit refill requests through [employer] or call your pharmacy and they will forward the refill request to us. Please allow 3 business days for your refill to be completed.          Additional Information About Your Visit        MyChart Information     [employer] gives you secure access to your electronic health record. If you see a primary care provider, you can also send messages to your care team and make appointments. If you have questions, please call your primary care clinic.  If you do not have a primary care  "provider, please call [phone] and they will assist you.        Care EveryWhere ID     This is your Care EveryWhere ID. This could be used by other organizations to access your [facility] medical records  [id number]        Your Vitals Were     Pulse Temperature Height Pulse Oximetry BMI (Body Mass Index)       76 97.3  F (36.3  C) (Oral) 5' 1\" (1.549 m) 97% 22.71 kg/m2        Blood Pressure from Last 3 Encounters:   11/19/18 134/72   12/06/17 132/78   09/13/16 130/82    Weight from Last 3 Encounters:   11/19/18 120 lb 3.2 oz (54.5 kg)   12/06/17 130 lb (59 kg)   09/13/16 135 lb (61.2 kg)                 Today's Medication Changes          These changes are accurate as of 11/19/18  1:58 PM.  If you have any questions, ask your nurse or doctor.               These medicines have changed or have updated prescriptions.        Dose/Directions    * PRESERVISION AREDS 2 Caps   This may have changed:  Another medication with the same name was changed. Make sure you understand how and when to take each.        Dose:  2 capsule   Take 2 capsules by mouth daily   Refills:  0       * vitamin  s/Minerals Tabs   This may have changed:  See the new instructions.        1 TABLET DAILY   Quantity:  30   Refills:  0       * Notice:  This list has 2 medication(s) that are the same as other medications prescribed for you. Read the directions carefully, and ask your doctor or other care provider to review them with you.      Stop taking these medicines if you haven't already. Please contact your care team if you have questions.     magnesium 100 MG Tabs   Stopped by:  Zabrina Lorenzo DO                    Primary Care Provider Office Phone # Fax #    Zabrina Lorenzo [phone] [address]  [place] [address]        Equal Access to Services     PRANAV BELLO AH: Hadii elizabeth ku hadasho Soomaali, waaxda luqadaha, qaybta kaalmada adeegyada, waxay zion hendrix. So wac " [phone].    ATENCIÓN: Si katie nicolas, tiene a medina disposición servicios gratuitos de asistencia lingüística. Sorin knox [phone].    We comply with applicable federal civil rights laws and [place] laws. We do not discriminate on the basis of race, color, national origin, age, disability, sex, sexual orientation, or gender identity.            Thank you!     Thank you for choosing [facility]  for your care. Our goal is always to provide you with excellent care. Hearing back from our patients is one way we can continue to improve our services. Please take a few minutes to complete the written survey that you may receive in the mail after your visit with us. Thank you!             Your Updated Medication List - Protect others around you: Learn how to safely use, store and throw away your medicines at www.disposemymeds.org.          This list is accurate as of 11/19/18  1:58 PM.  Always use your most recent med list.                   Brand Name Dispense Instructions for use Diagnosis    Biotin 5000 MCG Tabs      Take 1 tablet by mouth daily        Calcium-Vitamin D-Vitamin K 500-1000-40 MG-UNT-MCG Chew      Taking calcium+D gummie once daily        CO Q 10 PO           KRILL OIL PO      Take 350 mg by mouth        loratadine 10 MG tablet    CLARITIN     Take 1 tablet (10 mg) by mouth as needed for allergies        MAGNESIUM OXIDE PO      Take 400 mg by mouth daily        melatonin 1 MG Tabs tablet      Take 2 mg by mouth At Bedtime        pravastatin 40 MG tablet    PRAVACHOL    90 tablet    TAKE 1 TABLET(40 MG) BY MOUTH DAILY    Hyperlipidemia, unspecified hyperlipidemia type       PROBIOTIC-10 Caps      Take 1 capsule by mouth daily        * PRESERVISION AREDS 2 Caps      Take 2 capsules by mouth daily        * vitamin  s/Minerals Tabs     30    1 TABLET DAILY        vitamin B complex with vitamin C Tabs tablet      Take 1 tablet by mouth daily        VITAMIN D3 PO      Take 1,000 Units by mouth  daily        * Notice:  This list has 2 medication(s) that are the same as other medications prescribed for you. Read the directions carefully, and ask your doctor or other care provider to review them with you.

## 2018-11-20 DIAGNOSIS — E78.5 HYPERLIPIDEMIA, UNSPECIFIED HYPERLIPIDEMIA TYPE: Chronic | ICD-10-CM

## 2018-11-20 DIAGNOSIS — M19.90 ARTHRITIS: ICD-10-CM

## 2018-11-20 DIAGNOSIS — M85.80 OSTEOPENIA, UNSPECIFIED LOCATION: Chronic | ICD-10-CM

## 2018-11-20 DIAGNOSIS — R00.2 PALPITATIONS: ICD-10-CM

## 2018-11-20 LAB — HGB BLD-MCNC: 13 G/DL (ref 11.7–15.7)

## 2018-11-20 PROCEDURE — 85018 HEMOGLOBIN: CPT | Performed by: INTERNAL MEDICINE

## 2018-11-20 PROCEDURE — 82550 ASSAY OF CK (CPK): CPT | Performed by: INTERNAL MEDICINE

## 2018-11-20 PROCEDURE — 80061 LIPID PANEL: CPT | Performed by: INTERNAL MEDICINE

## 2018-11-20 PROCEDURE — 80053 COMPREHEN METABOLIC PANEL: CPT | Performed by: INTERNAL MEDICINE

## 2018-11-20 PROCEDURE — 82306 VITAMIN D 25 HYDROXY: CPT | Performed by: INTERNAL MEDICINE

## 2018-11-20 PROCEDURE — 36415 COLL VENOUS BLD VENIPUNCTURE: CPT | Performed by: INTERNAL MEDICINE

## 2018-11-20 PROCEDURE — 86200 CCP ANTIBODY: CPT | Performed by: INTERNAL MEDICINE

## 2018-11-20 PROCEDURE — 86431 RHEUMATOID FACTOR QUANT: CPT | Performed by: INTERNAL MEDICINE

## 2018-11-21 LAB
ALBUMIN SERPL-MCNC: 4.2 G/DL (ref 3.4–5)
ALP SERPL-CCNC: 48 U/L (ref 40–150)
ALT SERPL W P-5'-P-CCNC: 25 U/L (ref 0–50)
ANION GAP SERPL CALCULATED.3IONS-SCNC: 9 MMOL/L (ref 3–14)
AST SERPL W P-5'-P-CCNC: 27 U/L (ref 0–45)
BILIRUB SERPL-MCNC: 0.5 MG/DL (ref 0.2–1.3)
BUN SERPL-MCNC: 11 MG/DL (ref 7–30)
CALCIUM SERPL-MCNC: 9.5 MG/DL (ref 8.5–10.1)
CCP AB SER IA-ACNC: 1 U/ML
CHLORIDE SERPL-SCNC: 102 MMOL/L (ref 94–109)
CHOLEST SERPL-MCNC: 209 MG/DL
CK SERPL-CCNC: 75 U/L (ref 30–225)
CO2 SERPL-SCNC: 25 MMOL/L (ref 20–32)
CREAT SERPL-MCNC: 0.71 MG/DL (ref 0.52–1.04)
DEPRECATED CALCIDIOL+CALCIFEROL SERPL-MC: 73 UG/L (ref 20–75)
GFR SERPL CREATININE-BSD FRML MDRD: 81 ML/MIN/1.7M2
GLUCOSE SERPL-MCNC: 92 MG/DL (ref 70–99)
HDLC SERPL-MCNC: 69 MG/DL
LDLC SERPL CALC-MCNC: 116 MG/DL
NONHDLC SERPL-MCNC: 140 MG/DL
POTASSIUM SERPL-SCNC: 3.9 MMOL/L (ref 3.4–5.3)
PROT SERPL-MCNC: 7.6 G/DL (ref 6.8–8.8)
SODIUM SERPL-SCNC: 136 MMOL/L (ref 133–144)
TRIGL SERPL-MCNC: 121 MG/DL

## 2018-11-23 LAB — RHEUMATOID FACT SER NEPH-ACNC: <20 IU/ML (ref 0–20)

## 2018-11-27 ENCOUNTER — HOSPITAL ENCOUNTER (OUTPATIENT)
Dept: CARDIOLOGY | Facility: CLINIC | Age: 68
Discharge: HOME | End: 2018-11-27
Attending: INTERNAL MEDICINE | Admitting: INTERNAL MEDICINE
Payer: MEDICARE

## 2018-11-27 DIAGNOSIS — R00.2 PALPITATIONS: ICD-10-CM

## 2018-11-27 PROCEDURE — 0298T ZZC EXT ECG > 48HR TO 21 DAY REVIEW AND INTERPRETATN: CPT | Performed by: INTERNAL MEDICINE

## 2018-11-27 PROCEDURE — 0296T ZIO PATCH HOLTER: CPT

## 2018-11-29 RX ORDER — PRAVASTATIN SODIUM 40 MG
TABLET ORAL
Qty: 90 TABLET | Refills: 3 | Status: SHIPPED | OUTPATIENT
Start: 2018-11-29 | End: 2019-11-07

## 2018-12-14 ENCOUNTER — MYC MEDICAL ADVICE (OUTPATIENT)
Dept: FAMILY MEDICINE | Facility: CLINIC | Age: 68
End: 2018-12-14

## 2018-12-16 PROBLEM — I49.3 PVC'S (PREMATURE VENTRICULAR CONTRACTIONS): Status: ACTIVE | Noted: 2018-12-16

## 2018-12-18 ENCOUNTER — ANCILLARY PROCEDURE (OUTPATIENT)
Dept: GENERAL RADIOLOGY | Facility: CLINIC | Age: 68
End: 2018-12-18
Attending: PODIATRIST
Payer: COMMERCIAL

## 2018-12-18 ENCOUNTER — OFFICE VISIT (OUTPATIENT)
Dept: PODIATRY | Facility: CLINIC | Age: 68
End: 2018-12-18
Payer: COMMERCIAL

## 2018-12-18 VITALS
BODY MASS INDEX: 22.66 KG/M2 | SYSTOLIC BLOOD PRESSURE: 147 MMHG | HEIGHT: 61 IN | WEIGHT: 120 LBS | DIASTOLIC BLOOD PRESSURE: 83 MMHG

## 2018-12-18 DIAGNOSIS — M21.619 BUNION: Primary | ICD-10-CM

## 2018-12-18 DIAGNOSIS — M20.42 HAMMER TOE OF LEFT FOOT: ICD-10-CM

## 2018-12-18 PROCEDURE — 73630 X-RAY EXAM OF FOOT: CPT | Mod: LT

## 2018-12-18 PROCEDURE — 99203 OFFICE O/P NEW LOW 30 MIN: CPT | Performed by: PODIATRIST

## 2018-12-18 ASSESSMENT — MIFFLIN-ST. JEOR: SCORE: 1001.7

## 2018-12-18 NOTE — LETTER
12/18/2018         RE: Dorie Gagnon  [address] [place] [address]        Dear Colleague,    Thank you for referring your patient, Dorie Gagnon, to the [facility]. Please see a copy of my visit note below.    PATIENT HISTORY:  Dorie Gagnon is a 68 year old female who presents to clinic for left foot bunion and hammertoe.  1-5/10 pain.  10 year duration.  Pt had R foot bunion and hammertoe repair in 2012 by Dr Saucedo.  Retired.      Review of Systems:  Patient denies fever, chills, rash, wound, stiffness, limping, numbness, weakness, heart burn, blood in stool, chest pain with activity, calf pain when walking, shortness of breath with activity, chronic cough, easy bleeding/bruising, swelling of ankles, excessive thirst, fatigue, depression, anxiety.      PAST MEDICAL HISTORY:   Past Medical History:   Diagnosis Date     Allergy to cats     nasal symptoms     Family history of ischemic heart disease     parents and brother with CAD onset age 60s     Hidradenitis     recurrent, groin     Influenza with other manifestations 1972    hospitalized     Menopause 1993    age 43     Mixed hyperlipidemia      Osteopenia 2003 2014 FRAX: 12% risk major osteoporotic fx, 2.2% risk hip fx     Status post bunionectomy 5/2012    right with hammertoe repairs        PAST SURGICAL HISTORY:   Past Surgical History:   Procedure Laterality Date     BUNIONECTOMY KATIE  5/14/2012    Procedure:BUNIONECTOMY KATIE; RIGHT FOOT EULA KAITE BUNIONECTOMY, HAMMER TOE RECONSTRUCTION DIGITS TWO, THREE, FOUR AND FIVE; Surgeon:DEANDRE MONTE; Location:[facility]     C DEXA INTERPRETATION, AXIAL  8/2003    T score femur -1.3, lumbar -1.0     C DEXA INTERPRETATION, AXIAL  8/2004    T score lumbar -1.2, femoral -1.4     C DEXA INTERPRETATION, AXIAL  11/2007    T score lumbar -1.5 (decline -2.8%/yr), femoral neck -1.3/-1.8 (decline -1.8%/yr)     C DEXA INTERPRETATION, AXIAL  6/2014    T score L1-4 of -1.4 (dec  2.7%), femoral neck -1.9/-2.2 with BMD 0.728 (dec 0.8%)     COLONOSCOPY       COLONOSCOPY  2000    normal, repeat 10 years     COLONOSCOPY  10/2010    normal, repeat 5 years     DEXA  10/2010    T score lumbar -1.2(stable), femoral neck -1.8/-2.2 with BMD 0.755 (6% worsening)     HC TOOTH EXTRACTION W/FORCEP      wisdom teeth extracted     REPAIR HAMMER TOE  2012    Procedure:REPAIR HAMMER TOE; Surgeon:DEANDRE MONTE; Location:[facility]     STRESS ECHO  10/2010    normal LV function at rest, hyperdynamic with exercise, EF 55-60% at rest and 70% post exercise     STRESS ECHO  10/2010    negative for echo/EKG changes of ischemia, normal LV fct with EF 55-60%, systolic fct hyperdynamic with exercise, exercised 7 min, EK.5 mm upsloping ST depression inf lead     STRESS ECHO (METRO)  2004    negative, exercise 8-9 min        MEDICATIONS:   Current Outpatient Medications:      Biotin 5000 MCG TABS, Take 1 tablet by mouth daily, Disp: , Rfl:      Calcium-Vitamin D-Vitamin K 500-1000-40 MG-UNT-MCG CHEW, Taking calcium+D gummie once daily, Disp: , Rfl:      Cholecalciferol (VITAMIN D3 PO), Take 1,000 Units by mouth daily, Disp: , Rfl:      Coenzyme Q10 (CO Q 10 PO), , Disp: , Rfl:      KRILL OIL PO, Take 350 mg by mouth, Disp: , Rfl:      loratadine (CLARITIN) 10 MG tablet, Take 1 tablet (10 mg) by mouth as needed for allergies, Disp: , Rfl:      MAGNESIUM OXIDE PO, Take 400 mg by mouth daily, Disp: , Rfl:      melatonin 1 MG TABS, Take 2 mg by mouth At Bedtime , Disp: , Rfl:      Multiple Vitamins-Minerals (PRESERVISION AREDS 2) CAPS, Take 2 capsules by mouth daily , Disp: , Rfl:      pravastatin (PRAVACHOL) 40 MG tablet, TAKE 1 TABLET(40 MG) BY MOUTH DAILY, Disp: 90 tablet, Rfl: 3     Probiotic Product (PROBIOTIC-10) CAPS, Take 1 capsule by mouth daily, Disp: , Rfl:      vitamin  B complex with vitamin C (VITAMIN  B COMPLEX) TABS, Take 1 tablet by mouth daily, Disp: , Rfl:      VITAMINS/MINERALS OR  TABS, 1 TABLET DAILY (Patient taking differently: Liquid vitamins daily), Disp: 30, Rfl: 0     ALLERGIES:  No Known Allergies     SOCIAL HISTORY:   Social History     Socioeconomic History     Marital status:      Spouse name: Armond     Number of children: 0     Years of education: 16     Highest education level: Not on file   Social Needs     Financial resource strain: Not on file     Food insecurity - worry: Not on file     Food insecurity - inability: Not on file     Transportation needs - medical: Not on file     Transportation needs - non-medical: Not on file   Occupational History     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 sleep disturbances     Stress Concern Yes     Comment: moderate     Weight Concern No     Special Diet Yes     Comment: ( diabetic)- low carb//soy milk     Back Care No     Exercise Yes     Comment: walking/aerobic 60 min 2 days per week, yoga one hour per week     Bike Helmet No     Seat Belt Yes     Self-Exams No     Parent/sibling w/ CABG, MI or angioplasty before 65F 55M? Not Asked   Social History Narrative     Lives with  who is diabetic.        FAMILY HISTORY:   Family History   Problem Relation Age of Onset     C.A.D. Father         MI age 61,   age 76 from MI     Diabetes Father         type 2     Cancer Father         esophagus, former smoker     Obesity Father      C.A.D. Mother         MI age 62     Lipids Mother      Blood Disease Mother         pernicious anemia     Thyroid Disease  "Mother         hypothyroidism     Eye Disorder Mother         macular degeneration     Osteoporosis Mother         foot fracture     Connective Tissue Disorder Mother         SLE - remission     Hypertension Mother      Hyperlipidemia Mother      Asthma Mother      Dementia Mother         memory loss, onset early 90s     C.A.D. Maternal Grandmother          early 80s     C.A.D. Maternal Grandfather          at younger age     C.A.D. Paternal Grandmother          late 70s     Hypertension Brother      Colon Polyps Brother         sister may also have colon polyps     Coronary Artery Disease Brother         angioplasty     Asthma Brother      Obesity Brother      Arrhythmia Brother      Hyperlipidemia Sister      Osteoporosis Sister      Colon Polyps Sister         possible        EXAM:Vitals: /83   Ht 1.549 m (5' 1\")   Wt 54.4 kg (120 lb)   BMI 22.67 kg/m     BMI= Body mass index is 22.67 kg/m .    General appearance: Patient is alert and fully cooperative with history & exam.  No sign of distress is noted during the visit.     Psychiatric: Affect is pleasant & appropriate.  Patient appears motivated to improve health.     Respiratory: Breathing is regular & unlabored while sitting.     HEENT: Hearing is intact to spoken word.  Speech is clear.  No gross evidence of visual impairment that would impact ambulation.     Dermatologic: Skin is intact to left foot without significant lesions, rash or abrasion.  No paronychia or evidence of soft tissue infection is noted.     Vascular: DP & PT pulses are intact & regular on the left.  No significant edema or varicosities noted.  CFT and skin temperature are normal to both lower extremities.     Neurologic: Lower extremity sensation is intact to light touch.  No evidence of weakness or contracture in the lower extremities.  No evidence of neuropathy.     Musculoskeletal: Hallux abducto valgus is noted with left foot exam. Lateral deviation of the first " toe is appreciated. A bump is noted on the medial aspect of the first metatarsal head. Evidence of soft tissue irritation is noted over the dorsal medial aspect of the first metatarsal head. L 2nd hammertoe noted.  Patient is ambulatory without assistive device or brace.  No gross ankle deformity noted.  No foot or ankle joint effusion is noted.    XRs of L foot reviewed with pt.  Increased 1st IM angle of 19 degrees.  Mild DJD of 1st MPJ.     ASSESSMENT:   L foot bunion, hammertoe     PLAN:  Reviewed patient's chart in epic.  Discussed condition and treatment options including pros and cons.    We discussed treatment alternatives regarding bunion pain and deformity.  I explained that bunion surgery is mostly to reduce the size of the medial bump.  Bunion deformity is usually progressive and further drifting of the toe may occur even with surgery.     Non-operative treatments were discussed including wide fitting shoes, splints, pads and orthotics.  Wide fitting shoes are likely the most useful non-surgical treatment.  I would not expect much improvement from NSAIDs, injection or bunion night splints.     Hammertoe treatment options were discussed.  This included both surgical and non-surgical treatment alternatives.  Non-surgical options include wide fitting shoes, deep toe box, crest pad or foam pads, foot orthotics and debridement of any callous as necessary.  Surgical treatments were explained.    Potential complications from surgery were discussed.      Patient is aware that surgery is elective, can be avoided if desired.  The recovery process was discussed including impact to work, walking, shoes and daily activities.  I would anticipate up to 12 months for maximum recovery after surgery. Likely surgical procedures based on exam and xray findings include Lapidus fusion, 2nd hammertoe repair.    Pt is considering this in the near future.    F/u prn.      Nilo Roberson DPM, FACFAS      Patient to follow  up with Primary Care provider regarding elevated blood pressure.          Again, thank you for allowing me to participate in the care of your patient.        Sincerely,        Nilo Roberson DPM

## 2018-12-18 NOTE — PATIENT INSTRUCTIONS
"Surgical planning.  If you have decided to have surgery, follow these few steps to get the procedure scheduled and to have the proper paperwork filled out.  If you are unsure about surgery, or would like to sit down and further discuss your issue and treatment options, please make a clinic appointment with Dr Roberson.    1.  Pick the date that you would like to have surgery.  Keep in mind that you will likely need at least 2 weeks off after the procedure for proper rest and healing.    2.  Call the surgery scheduling line at [phone] to get the procedure scheduled.    3.  Make an appointment to see Dr Roberson within 1 week of the date of surgery for your pre-operative consult.  When making the appointment, say \"I need to make a 40 minute surgical consult with Dr Roberson\".  It is recommended that you bring a spouse, family member or friend with you.  There will be lots of information presented.  It can be overwhelming, and it is better to have someone there to help sort out the details.    4.  Make an appointment to see your Primary Care Physician within 4 weeks of the date of surgery for your \"Pre-operative History and Physical\".  This is done to make sure you are medically healthy to undergo surgery.    If you have any post-operative questions regarding your procedure, call our triage RNs at [phone].            Bunion (hallux abducto valgus)   A bunion is caused by muscle imbalance. The great toe is pulled toward the smaller toes. The metatarsal head is pushed outward creating a lump on the side of your foot. Imbalance is the result of foot structure and instability.   Bunions do not improve with time. They usually enlarge, however this is a fairly slow process. Shoes do not necessarily cause bunions, however, they can hasten development and definitely cause bunions to hurt.   Bunions often run in families. We inherit a certain foot structure, which may be predisposed to bunion development.   Bunion " "pain is likely a combination of shoes rubbing on the bump, nerve irritation, compression between the toes, joint misalignment, arthritis and altered gait.   Signs & Symptoms of \"Bunions\"   Bunions are usually termed mild, moderate or severe. Just because you have a bunion does not mean you have to have pain. There are some people with very severe bunions and no pain and people with mild bunions and a lot of pain.    1.  Pain on the inside of your foot at the big toe joint (1st MTPJ)    2.  Swelling on the inside of your foot at the big toe joint    3.  Redness on the inside of your foot at the big toe joint    4.  Numbness or burning in the big toe (hallux)    5.  Decreased motion at the big toe joint    6.  Painful bursa (fluid-filled sac) on the inside of your foot at the big toe joint    7.  Pain while wearing shoes -especially shoes too narrow or with high heels     8.  Pain during activities    9.  Corn in between the big toe and second toe    10.  Callous formation on the side or bottom of the big toe or big toe joint    11.  Callous under the second toe joint (2nd MTPJ)    12.  Pain in the second toe joint   Treatment for \"Bunions\"   Conservative (non-surgical) treatment will not make the bunion go away, but it will hopefully decrease the signs and symptoms you have and help you get rid of the pain and get you back to your activities.    1.  Wider shoes    2.  Extra depth shoes    3.  Stretch shoes (where bunion is) Cut an \"x\" or cross in the shoe (where bunion is)    4.  Ice    5.  Padding, splints, toe spacers    6.  NSAIDs    7.  Arch supports    8.  Custom orthoses (orthotics)    9.  Change activities    10.  Physical therapy     11.  Surgical treatment for bunions is sometimes needed. If you are limited by pain, cannot fit in shoes comfortably and are not able to do your daily activities then surgery may be a good option for you. There are many different surgical procedures to repair bunions. Your foot " doctor (podiatrist) will review your foot exam findings, your x-rays, your age, your health, your lifestyle, your physical activity level and discuss with you which procedure he or she would recommend. Surgical procedures for bunions range from soft tissue repair to cutting and realigning the bones. It is not recommended that you have bunion surgery for cosmetic reasons (you do not like how your foot looks) or because you want to fit in a certain pair of shoes; There is the risk that even after surgery, the bunion will reoccur 9-10% of the time.   Most bunion pain can be improved simply by wearing compatible shoes. People with bunions cannot choose footwear simply because they like the style. Your bunion should determine which shoes are to be worn. Wide shoes with nonirritating seams,soft leather and a square toe box are most compatible with a bunion. Shoes should fit appropriately right out of the box but may need to be professionally stretched and modified to accommodate the bump. Heels, dress shoes and shoes with pointed toes will not be comfortable.   Shoe inserts or orthotics will often times help with bunion pain, however inserts make shoe fit more challenging. Pads placed over the bunion can also help relieve the pain. Injection may help with nerve irritation.   Bunion surgery involves cutting and repositioning the bones surrounding the bunion. Pins and screws are used to hold the bones in place during the healing process. The goal of bunion surgery is to reduce the size of the bunion bump. Realignment of the toe and joint is attempted.     Some first toes cannot be forced back into normal alignment even with surgery. Surgery is helpful in most cases but does not necessarily create a normal foot.   Healing after surgery requires about six weeks of protection. This allows the bone to heal. Maximum recovery takes about one year. The scar tissue and joint structures require this amount of time to finish the  healing process. Expect stiffness, swelling and numbness during that time frame. Bunion surgery does involve side effects. Some side effects are predictable and others are less common but do occur. A scar will be visible and could be irritated by shoes. The shoe may rub on the screw or internal pin requiring surgical removal of these fixation devices. The screw and pin would likely be left in place for a full year. The first toe may loose motion after bunion surgery. The amount of stiffness is variable. Some people never regain normal motion of the first toe. This is due to scar tissue inherent to any surgery. The first toe may drift toward the second toe or away from the second toe. Spreading of the first and second toes is a rare occurrence after bunion surgery. This can be quite bothersome and would need to be surgically repaired. Toe drift toward the second toe could result in a recurrent bunion and revision surgery. Joint fusion is one option to correct an unstable, drifting toe. This procedure straightens the toe, however, no motion remains. Fusion may be necessary to correct complications of bunion surgery or as the original procedure in severe cases.   All surgical procedures involve risk of infection, numbness, pain, delayed healing, osteotomy dislocation, blood clots, continued foot pain, etc. Bunion surgery is quite complex and should not be taken lightly.   Any skin incision can lead to infection. Deep infection might involve the bone and thus repeat surgery and six weeks of IV antibiotics. Scar tissue can cause nerve pain or numbness. This is generally temporary but can be permanent. We do not have treatments that cure nerve problems. Second toe pain could be related to altered mechanics and pressure transferred to the second toe. Most feet with bunions have pre-existing second toe problems. Delayed bone healing would lengthen the healing time. Some bones simply do not heal. This requires repeat  "surgery, electronic bone stimulation and/or extended protection. Smokers have an approximate 20% chance of poor bone healing. This is double that of a non-smoker. The bone cut may displace. This may need to be repaired with a second operation. Displacement can cause joint malalignment. Immobility after surgery can cause blood clots in the legs and lungs. This could result in death.   Foot pain is complex. Most feet hurt for more than one reason. Fixing the bunion would not necessarily create a pain free foot. Appropriate shoes, healthy body weight, avoidance of bare foot walking and moderation of activity will always be necessary to enjoy foot comfort. Your bunion may involve arthritis, which is incurable even with surgery. Long standing bunions often involve chronic irritation to the surrounding nerves. Nerve pain may not resolve even with reducing the bunion bump since permanent nerve damage may be present   Bunion surgery is nevertheless quite successful. Most surgical patients are pleased with their foot following bunion surgery. Many of the issues described above can be controlled by taking proper care of your foot during the healing process.   Cosmetic bunion surgery is discouraged for the reasons listed above. A bunion that is comfortable when wearing appropriate shoes should simply be treated with appropriate shoes.   Your surgeon would be happy to fully describe any of the above issues. You should pursue a full understanding of the operation,recovery process and any potential problems that could develop.   Prevention of \"Bunions\"    1.  Do not wear high heels if there is a family history of bunions.   2.  Wear shoes that have enough width and depth in the toe box.  Use a motion control arch support if you over-pronate (foot rolls in)           Hammertoe           What Is Hammertoe?  Hammertoe is a contracture (bending) of one or both joints of the second, third, fourth, or fifth (little) toes. This abnormal " bending can put pressure on the toe when wearing shoes, causing problems to develop.  Hammertoes usually start out as mild deformities and get progressively worse over time. In the earlier stages, hammertoes are flexible and the symptoms can often be managed with noninvasive measures. But if left untreated, hammertoes can become more rigid and will not respond to non-surgical treatment.  Because of the progressive nature of hammertoes, they should receive early attention. Hammertoes never get better without some kind of intervention.  Causes  The most common cause of hammertoe is a muscle/tendon imbalance. This imbalance, which leads to a bending of the toe, results from mechanical (structural) changes in the foot that occur over time in some people.  Hammertoes may be aggravated by shoes that don t fit properly. A hammertoe may result if a toe is too long and is forced into a cramped position when a tight shoe is worn.  Occasionally, hammertoe is the result of an earlier trauma to the toe. In some people, hammertoes are inherited.  Symptoms  Common symptoms of hammertoes include:  Pain or irritation of the affected toe when wearing shoes.   Corns and calluses (a buildup of skin) on the toe, between two toes, or on the ball of the foot. Corns are caused by constant friction against the shoe. They may be soft or hard, depending upon their location.   Inflammation, redness, or a burning sensation   Contracture of the toe   In more severe cases of hammertoe, open sores may form.   Diagnosis  Although hammertoes are readily apparent, to arrive at a diagnosis the foot and ankle surgeon will obtain a thorough history of your symptoms and examine your foot. During the physical examination, the doctor may attempt to reproduce your symptoms by manipulating your foot and will study the contractures of the toes. In addition, the foot and ankle surgeon may take x-rays to determine the degree of the deformities and assess any  changes that may have occurred.   Hammertoes are progressive - they don t go away by themselves and usually they will get worse over time. However, not all cases are alike - some hammertoes progress more rapidly than others. Once your foot and ankle surgeon has evaluated your hammertoes, a treatment plan can be developed that is suited to your needs.  Non-surgical Treatment  There is a variety of treatment options for hammertoe. The treatment your foot and ankle surgeon selects will depend upon the severity of your hammertoe and other factors.  A number of non-surgical measures can be undertaken:  Padding corns and calluses. Your foot and ankle surgeon can provide or prescribe pads designed to shield corns from irritation. If you want to try over-the-counter pads, avoid the medicated types. Medicated pads are generally not recommended because they may contain a small amount of acid that can be harmful. Consult your surgeon about this option.   Changes in shoewear. Avoid shoes with pointed toes, shoes that are too short, or shoes with high heels - conditions that can force your toe against the front of the shoe. Instead, choose comfortable shoes with a deep, roomy toe box and heels no higher than two inches.   Orthotic devices. A custom orthotic device placed in your shoe may help control the muscle/tendon imbalance.   Injection therapy. Corticosteroid injections are sometimes used to ease pain and inflammation caused by hammertoe.   Medications. Oral nonsteroidal anti-inflammatory drugs (NSAIDs), such as ibuprofen, may be recommended to reduce pain and inflammation.   Splinting/strapping. Splints or small straps may be applied by the surgeon to realign the bent toe.       HAMMERTOE SURGERY   Hammertoe surgery is complex. The surgical procedure is an attempt to help the toe lay in a better position. Nearly every structure in the toe will be cut including the tendons, ligaments, skin and bone. Hammertoes are a complex  deformity and final toe position is difficult to predict. The only sure way to position a toe is to fuse all three digital joints. That will not happen as some degree of toe motion is needed for walking. The toe may not be completely reduced as the surrounding skin and other structures may not allow the toe to return to a normal position. The tendons on adjacent toes may need to be cut at the time of the original or subsequent surgeries, as interconnections exist between the toes. The toe may drift after surgery. Stiffness may develop leading to new areas of pressure.   Future shoe choices will be critical in allowing the surgery to provide comfort. The toes will still hurt if shoes rub. The original pain may also persist as other foot problems may be contributing to the current pain. The toe may or may not be pinned in place. External pins would require complete avoidance of water on the foot for six weeks. The pin would be removed about six weeks after the surgery. Strict attention to protection is critical. The pin could get bumped or loosen resulting in early removal. Removal might be necessary before the bone heals which would negatively affect the final surgical outcome and toe alignment.     OVER THE COUNTER INSERTS    SuperFeet   Sofsole Fit Spenco   Power Step   Walk-Fit Arch Cradles     Most of these can be found at your local [employer], [employer], or online:    1.  [email or web address]  2.  [email or web address]  3.  [email or web address]    **A good high quality over the counter insert should cost around $40-$50                HYPERTENSION (HIGH BLOOD PRESSURE)  Hypertension (HTN) or high blood pressure, sometimes called arterial hypertension, is a chronic medical condition in which the blood pressure in the arteries is elevated.     Hypertension is a major risk factor for stroke, heart attacks, heart failure, peripheral arterial disease and is  a cause of chronic kidney disease. Even moderate elevation of arterial blood pressure is associated with a shortened life expectancy. Dietary and lifestyle changes can improve blood pressure control and decrease the risk of associated health complications, although drug treatment is often necessary in people for whom lifestyle changes prove ineffective or insufficient  Lifestyle changes are recommended to lower blood pressure, before starting drug therapy.   maintain normal body weight for adults   reduce dietary sodium intake to <100 mmol/ day (<6 g of sodium chloride or <2.4 g of sodium per day)   engage in regular aerobic physical activity such as brisk walking (?30 min per day, most days of the week)   limit alcohol consumption to no more than 3 units/day in men and no more than 2 units/day in women   consume a diet rich in fruit and vegetables (e.g. at least five portions per day);  Effective lifestyle modification may lower blood pressure as much an individual antihypertensive drug. Combinations of two or more lifestyle modifications can achieve even better results.    Normal blood pressure at rest is within the range of 100-140mmHg systolic (top reading) and 60-90mmHg diastolic (bottom reading). High blood pressure is said to be present if it is persistently at or above 140/90 mmHg.    It is important to monitor your blood pressure regularly. Please follow up with your primary care clinic in the next couple of weeks to get your blood pressure re- assessed to determine if you need to be placed on medication.

## 2019-04-15 ENCOUNTER — OFFICE VISIT (OUTPATIENT)
Dept: FAMILY MEDICINE | Facility: CLINIC | Age: 69
End: 2019-04-15
Payer: COMMERCIAL

## 2019-04-15 VITALS
HEART RATE: 94 BPM | WEIGHT: 121 LBS | SYSTOLIC BLOOD PRESSURE: 132 MMHG | OXYGEN SATURATION: 96 % | BODY MASS INDEX: 22.84 KG/M2 | HEIGHT: 61 IN | DIASTOLIC BLOOD PRESSURE: 76 MMHG | TEMPERATURE: 98.4 F

## 2019-04-15 DIAGNOSIS — M79.675 PAIN OF TOE OF LEFT FOOT: ICD-10-CM

## 2019-04-15 DIAGNOSIS — J40 BRONCHITIS: ICD-10-CM

## 2019-04-15 DIAGNOSIS — Z01.818 PREOP GENERAL PHYSICAL EXAM: Primary | ICD-10-CM

## 2019-04-15 PROCEDURE — 99214 OFFICE O/P EST MOD 30 MIN: CPT | Performed by: FAMILY MEDICINE

## 2019-04-15 RX ORDER — MULTIVITAMIN/IRON/FOLIC ACID 18MG-0.4MG
TABLET ORAL
COMMUNITY
End: 2019-04-23

## 2019-04-15 RX ORDER — AZITHROMYCIN 250 MG/1
TABLET, FILM COATED ORAL
Qty: 6 TABLET | Refills: 0 | Status: SHIPPED | OUTPATIENT
Start: 2019-04-15 | End: 2019-04-22

## 2019-04-15 ASSESSMENT — MIFFLIN-ST. JEOR: SCORE: 1001.23

## 2019-04-15 NOTE — PROGRESS NOTES
[facility] [place]  [address] [address]  [phone]  Dept: [phone]    PRE-OP EVALUATION:  Today's date: 4/15/2019    Dorie Gagnon (: 1950) presents for pre-operative evaluation assessment as requested by Dr. Jameson.  She requires evaluation and anesthesia risk assessment prior to undergoing surgery/procedure for treatment of left foot  Pain     Proposed Surgery/ Procedure:LEFT HALLIX VAGUS AND SECOND CLAWTOE RECONSTRUCTION   Date of Surgery/ Procedure: 2019  Time of Surgery/ Procedure: 10:10 am  Hospital/Surgical Facility:  Same day Surgery    Primary Physician: Lydia Durand  Type of Anesthesia Anticipated: other     Patient has a Health Care Directive or Living Will:  NO    1. NO - Do you have a history of heart attack, stroke, stent, bypass or surgery on an artery in the head, neck, heart or legs?  2. NO  - Do you ever have any pain or discomfort in your chest?  3. NO - Do you have a history of  Heart Failure?  4. NO - Are you troubled by shortness of breath when: walking on the level, up a slight hill or at night?  5. YES - Do you currently have a cold, bronchitis or other respiratory infection? Has uri sx past 3 weeks since after  coming back form [place], becoming               worse. Cough  becoming productive of phlegm looking brown and yellowish now at times. No fever or chills   6. YES - Do you have a cough, shortness of breath or wheezing? Cough as above, no sob or wheezing   7. NO - Do you sometimes get pains in the calves of your legs when you walk?  8. YES - Do you or anyone in your family have previous history of blood clots? Dad ? Possible MI related ?  9. NO - Do you or does anyone in your family have a serious bleeding problem such as prolonged bleeding following surgeries or cuts?  10. NO - Have you ever had problems with anemia or been told to take iron pills?  11. NO - Have you had any abnormal blood loss such as  black, tarry or bloody stools, or abnormal vaginal bleeding?  12. NO - Have you ever had a blood transfusion?  13. NO - Have you or any of your relatives ever had problems with anesthesia?  14. NO - Do you have sleep apnea, excessive snoring or daytime drowsiness?  15. NO - Do you have any prosthetic heart valves?  16. NO - Do you have prosthetic joints?  17. NO - Is there any chance that you may be pregnant?      HPI:     HPI related to upcoming procedure: has ongoing issue with bunion and a claw toe, causing pain issue , so now scheduled for surgery       See problem list for active medical problems.  Problems all longstanding and stable, except as noted/documented.  See ROS for pertinent symptoms related to these conditions.                                                                                                                                                          .    MEDICAL HISTORY:     Patient Active Problem List    Diagnosis Date Noted     PVC's (premature ventricular contractions) 12/16/2018     Priority: Medium     Palpitations 12/08/2017     Priority: Medium     Acid indigestion 12/08/2017     Priority: Medium     Family history of polyps in the colon 06/25/2015     Priority: Medium     BROTHER AND SISTER.        Advanced directives, counseling/discussion 08/16/2011     Priority: Medium     Hyperlipidemia, unspecified 10/31/2010     Priority: Medium     Family history of ischemic heart disease      Priority: Medium     parents and bothers with CAD onset age 60s       Osteopenia      Priority: Medium     Hidradenitis      Priority: Medium     recurrent, groin        Past Medical History:   Diagnosis Date     Allergy to cats     nasal symptoms     Family history of ischemic heart disease     parents and brother with CAD onset age 60s     Hidradenitis     recurrent, groin     Influenza with other manifestations 1972    hospitalized     Menopause 1993    age 43     Mixed hyperlipidemia       Osteopenia 2003 FRAX: 12% risk major osteoporotic fx, 2.2% risk hip fx     Status post bunionectomy 2012    right with hammertoe repairs     Past Surgical History:   Procedure Laterality Date     BUNIONECTOMY KATIE  2012    Procedure:BUNIONECTOMY KATIE; RIGHT FOOT EULA KATIE BUNIONECTOMY, HAMMER TOE RECONSTRUCTION DIGITS TWO, THREE, FOUR AND FIVE; Surgeon:DEANDRE MONTE; Location: [facility]     C DEXA INTERPRETATION, AXIAL  2003    T score femur -1.3, lumbar -1.0     C DEXA INTERPRETATION, AXIAL  2004    T score lumbar -1.2, femoral -1.4     C DEXA INTERPRETATION, AXIAL  2007    T score lumbar -1.5 (decline -2.8%/yr), femoral neck -1.3/-1.8 (decline -1.8%/yr)     C DEXA INTERPRETATION, AXIAL  2014    T score L1-4 of -1.4 (dec 2.7%), femoral neck -1.9/-2.2 with BMD 0.728 (dec 0.8%)     COLONOSCOPY  1993     COLONOSCOPY  2000    normal, repeat 10 years     COLONOSCOPY  10/2010    normal, repeat 5 years     DEXA  10/2010    T score lumbar -1.2(stable), femoral neck -1.8/-2.2 with BMD 0.755 (6% worsening)     HC TOOTH EXTRACTION W/FORCEP      wisdom teeth extracted     REPAIR HAMMER TOE  2012    Procedure:REPAIR HAMMER TOE; Surgeon:DEANDRE MONTE; Location: [facility]     STRESS ECHO  10/2010    normal LV function at rest, hyperdynamic with exercise, EF 55-60% at rest and 70% post exercise     STRESS ECHO  10/2010    negative for echo/EKG changes of ischemia, normal LV fct with EF 55-60%, systolic fct hyperdynamic with exercise, exercised 7 min, EK.5 mm upsloping ST depression inf lead     STRESS ECHO (METRO)  2004    negative, exercise 8-9 min     Current Outpatient Medications   Medication Sig Dispense Refill     Biotin 5000 MCG TABS Take 1 tablet by mouth daily       Calcium-Vitamin D-Vitamin K 500-1000-40 MG-UNT-MCG CHEW Taking calcium+D gummie once daily       Cholecalciferol (VITAMIN D3 PO) Take 1,000 Units by mouth daily       Coenzyme Q10 (CO Q 10 PO)        KRILL OIL PO  Take 350 mg by mouth       loratadine (CLARITIN) 10 MG tablet Take 1 tablet (10 mg) by mouth as needed for allergies       MAGNESIUM OXIDE PO Take 400 mg by mouth daily       melatonin 1 MG TABS Take 2 mg by mouth At Bedtime        Multiple Vitamins-Minerals (PRESERVISION AREDS 2) CAPS Take 2 capsules by mouth daily        pravastatin (PRAVACHOL) 40 MG tablet TAKE 1 TABLET(40 MG) BY MOUTH DAILY 90 tablet 3     Probiotic Product (PROBIOTIC-10) CAPS Take 1 capsule by mouth daily       vitamin  B complex with vitamin C (VITAMIN  B COMPLEX) TABS Take 1 tablet by mouth daily       VITAMINS/MINERALS OR TABS 1 TABLET DAILY (Patient taking differently: Liquid vitamins daily) 30 0     OTC products: None, except as noted above    No Known Allergies   Latex Allergy: NO    Social History     Tobacco Use     Smoking status: Never Smoker     Smokeless tobacco: Never Used   Substance Use Topics     Alcohol use: Yes     Comment: 1 wine per day     History   Drug Use No       REVIEW OF SYSTEMS:   CONSTITUTIONAL: NEGATIVE for fever, chills, change in weight  INTEGUMENTARY/SKIN: NEGATIVE for worrisome rashes, moles or lesions  EYES: NEGATIVE for vision changes or irritation  ENT/MOUTH: NEGATIVE for ear, mouth and throat problems and POSITIVE for except as per HPI   RESP: NEGATIVE for significant cough or SOB  CV: NEGATIVE for chest pain, palpitations or peripheral edema  GI: NEGATIVE for nausea, abdominal pain, heartburn, or change in bowel habits  : NEGATIVE for frequency, dysuria, or hematuria  MUSCULOSKELETAL:NEGATIVE for significant arthralgias or myalgia and except foot problem as per HPI   NEURO: NEGATIVE for weakness, dizziness or paresthesias  ENDOCRINE: NEGATIVE for temperature intolerance, skin/hair changes  HEME: NEGATIVE for bleeding problems  PSYCHIATRIC: NEGATIVE for changes in mood or affect    EXAM:   There were no vitals taken for this visit.    GENERAL APPEARANCE: healthy, alert and no distress     EYES: EOMI,  PERRL     HENT: ear canals and TM's normal, Throat with mild pharyngeal erythema, no sinus  tenderness     NECK: no adenopathy, no asymmetry, masses, or scars and thyroid normal to palpation     RESP: lungs clear to auscultation - no rales, rhonchi or wheezes     CV: regular rates and rhythm, normal S1 S2, no S3 or S4      ABDOMEN:  soft, nontender, no HSM or masses and bowel sounds normal     MS: no   ankle edema     SKIN: no suspicious lesions or rashes     NEURO: Normal strength and tone, sensory exam grossly normal, mentation intact and speech normal     PSYCH: mentation appears normal. and affect normal/bright    DIAGNOSTICS:   No labs or EKG required for low risk surgery (cataract, skin procedure, breast biopsy, etc)    Recent Labs   Lab Test 11/20/18  1043 12/07/17  0854 09/15/16  1023   HGB 13.0 13.3 12.7   PLT  --  292 302    137 141   POTASSIUM 3.9 4.0 4.0   CR 0.71 0.72 0.71        IMPRESSION:   (Z01.818) Preop general physical exam  (primary encounter diagnosis)  Comment:   Plan:     (M79.675) Pain of toe of left foot  Comment: Bunion/ toe deformity   Plan: Ok for surgery    (J40) Bronchitis  Comment:   Plan: azithromycin (ZITHROMAX) 250 MG tablet            URI lingering onto bronchitis. Treat with z pack. Cares and symptomatic treatment discussed follow up if problem     The proposed surgical procedure is considered INTERMEDIATE risk.    REVISED CARDIAC RISK INDEX  The patient has the following serious cardiovascular risks for perioperative complications such as (MI, PE, VFib and 3  AV Block):  No serious cardiac risks  INTERPRETATION: 2 risks: Class III (moderate risk - 6.6% complication rate)    The patient has the following additional risks for perioperative complications:  No identified additional risks      RECOMMENDATIONS:       APPROVAL GIVEN to proceed with proposed procedure, without further diagnostic evaluation       Signed Electronically by: Lydia Durand MD    Copy of this  evaluation report is provided to requesting physician.    [facility] Preop Guidelines    Revised Cardiac Risk Index

## 2019-04-22 ENCOUNTER — OFFICE VISIT (OUTPATIENT)
Dept: FAMILY MEDICINE | Facility: CLINIC | Age: 69
End: 2019-04-22
Payer: COMMERCIAL

## 2019-04-22 VITALS
SYSTOLIC BLOOD PRESSURE: 138 MMHG | BODY MASS INDEX: 22.66 KG/M2 | WEIGHT: 120 LBS | OXYGEN SATURATION: 96 % | DIASTOLIC BLOOD PRESSURE: 86 MMHG | HEART RATE: 86 BPM | TEMPERATURE: 97.8 F | HEIGHT: 61 IN

## 2019-04-22 DIAGNOSIS — J40 BRONCHITIS: ICD-10-CM

## 2019-04-22 DIAGNOSIS — J30.89 SEASONAL ALLERGIC RHINITIS DUE TO OTHER ALLERGIC TRIGGER: Primary | ICD-10-CM

## 2019-04-22 PROCEDURE — 99213 OFFICE O/P EST LOW 20 MIN: CPT | Performed by: FAMILY MEDICINE

## 2019-04-22 RX ORDER — LORATADINE 10 MG/1
10 TABLET ORAL DAILY
Qty: 30 TABLET | Refills: 0 | COMMUNITY
Start: 2019-04-22 | End: 2021-01-16

## 2019-04-22 ASSESSMENT — MIFFLIN-ST. JEOR: SCORE: 996.7

## 2019-04-22 NOTE — PROGRESS NOTES
SUBJECTIVE:   Dorie Gagnon is a 69 year old female who presents to clinic today for the following   health issues:      RESPIRATORY SYMPTOMS      Duration: x 3 weeks, took zpack last week, having surgery next week , just wants to get checked again to make sure she is ok     Description  Cough is much better. Ha slight nasal congestion, rhinorrhea mostly clear,.  cough has improved and sius sx also have improved  melo mild headache probably allergy relate but not bad . Tylenol helped. No feevr or chills     Severity: mild    Accompanying signs and symptoms: None    History (predisposing factors):  none    Precipitating or alleviating factors: None    Therapies tried and outcome:  Finished zpack           Additional history: as documented    Reviewed  and updated as needed this visit by clinical staff         Reviewed and updated as needed this visit by Provider         Patient Active Problem List   Diagnosis     Family history of ischemic heart disease     Osteopenia     Hidradenitis     Hyperlipidemia, unspecified     Advanced directives, counseling/discussion     Family history of polyps in the colon     Palpitations     Acid indigestion     PVC's (premature ventricular contractions)     Past Surgical History:   Procedure Laterality Date     BUNIONECTOMY KATIE  5/14/2012    Procedure:BUNIONECTOMY KATIE; RIGHT FOOT EULA KATIE BUNIONECTOMY, HAMMER TOE RECONSTRUCTION DIGITS TWO, THREE, FOUR AND FIVE; Surgeon:DEANDRE MONTE; Location:[facility]     C DEXA INTERPRETATION, AXIAL  8/2003    T score femur -1.3, lumbar -1.0     C DEXA INTERPRETATION, AXIAL  8/2004    T score lumbar -1.2, femoral -1.4     C DEXA INTERPRETATION, AXIAL  11/2007    T score lumbar -1.5 (decline -2.8%/yr), femoral neck -1.3/-1.8 (decline -1.8%/yr)     C DEXA INTERPRETATION, AXIAL  6/2014    T score L1-4 of -1.4 (dec 2.7%), femoral neck -1.9/-2.2 with BMD 0.728 (dec 0.8%)     COLONOSCOPY  1993     COLONOSCOPY  9/2000    normal, repeat  10 years     COLONOSCOPY  10/2010    normal, repeat 5 years     DEXA  10/2010    T score lumbar -1.2(stable), femoral neck -1.8/-2.2 with BMD 0.755 (6% worsening)     HC TOOTH EXTRACTION W/FORCEP      wisdom teeth extracted     REPAIR HAMMER TOE  2012    Procedure:REPAIR HAMMER TOE; Surgeon:DEANDRE MONTE; Location:[facility]     STRESS ECHO  10/2010    normal LV function at rest, hyperdynamic with exercise, EF 55-60% at rest and 70% post exercise     STRESS ECHO  10/2010    negative for echo/EKG changes of ischemia, normal LV fct with EF 55-60%, systolic fct hyperdynamic with exercise, exercised 7 min, EK.5 mm upsloping ST depression inf lead     STRESS ECHO (METRO)  2004    negative, exercise 8-9 min       Social History     Tobacco Use     Smoking status: Never Smoker     Smokeless tobacco: Never Used   Substance Use Topics     Alcohol use: Yes     Comment: 1 wine per day     Family History   Problem Relation Age of Onset     C.A.D. Father         MI age 61,   age 76 from MI     Diabetes Father         type 2     Cancer Father         esophagus, former smoker     Obesity Father      C.A.D. Mother         MI age 62     Lipids Mother      Blood Disease Mother         pernicious anemia     Thyroid Disease Mother         hypothyroidism     Eye Disorder Mother         macular degeneration     Osteoporosis Mother         foot fracture     Connective Tissue Disorder Mother         SLE - remission     Hypertension Mother      Hyperlipidemia Mother      Asthma Mother      Dementia Mother         memory loss, onset early 90s     C.A.D. Maternal Grandmother          early 80s     C.A.D. Maternal Grandfather          at younger age     C.A.D. Paternal Grandmother          late 70s     Hypertension Brother      Colon Polyps Brother         sister may also have colon polyps     Coronary Artery Disease Brother         angioplasty     Asthma Brother      Obesity Brother      Arrhythmia Brother       "Hyperlipidemia Sister      Osteoporosis Sister      Colon Polyps Sister         possible           ROS:  Constitutional, HEENT, cardiovascular, pulmonary, GI, , musculoskeletal, neuro, skin, endocrine and psych systems are negative, except as otherwise noted.    OBJECTIVE:     /86   Pulse 86   Temp 97.8  F (36.6  C) (Tympanic)   Ht 1.549 m (5' 1\")   Wt 54.4 kg (120 lb)   SpO2 96%   BMI 22.67 kg/m    Body mass index is 22.67 kg/m .  GENERAL: healthy, alert and no distress  EYES: Eyes grossly normal to inspection, PERRL and conjunctivae and sclerae normal  HENT: ear canals and TM's normal, nose and mouth without ulcers or lesions  NECK: no adenopathy, no asymmetry, masses, or scars and thyroid normal to palpation  RESP: lungs clear to auscultation - no rales, rhonchi or wheezes  CV: regular rate and rhythm, normal S1 S2, no S3 or S4, no murmur, click or rub, no peripheral edema and peripheral pulses strong        ASSESSMENT/PLAN:       (J30.89) Seasonal allergic rhinitis due to other allergic trigger  (primary encounter diagnosis)  Comment: may take OTC as needed   Plan: loratadine (CLARITIN) 10 MG tablet            (J40) Bronchitis  Comment: improved   Plan: Cares and symptomatic treatment discussed follow up if problem         Cares and  treatment discussed follow up if problem. Ok for surgery    Patient expressed understanding and agreement with treatment plan. All patient's questions were answered, will let me know if has more later.  Medications: Rx's: Reviewed the potential side effects/complications of medications prescribed.       Lydia Durand MD  [facility] [place]        "

## 2019-04-23 RX ORDER — ACETAMINOPHEN/DIPHENHYDRAMINE 500MG-25MG
2 TABLET ORAL DAILY
COMMUNITY
End: 2022-05-13

## 2019-04-23 RX ORDER — KRILL/OM-3/DHA/EPA/PHOSPHO/AST 500MG-86MG
500 CAPSULE ORAL
COMMUNITY
End: 2019-11-07

## 2019-04-23 RX ORDER — CEPHALEXIN 250 MG
2 CAPSULE ORAL DAILY
COMMUNITY
End: 2021-09-19

## 2019-04-23 RX ORDER — CARBOXYMETHYLCELLULOSE SODIUM 5 MG/ML
1-2 SOLUTION/ DROPS OPHTHALMIC AT BEDTIME
COMMUNITY

## 2019-04-24 ENCOUNTER — ANESTHESIA (OUTPATIENT)
Dept: SURGERY | Facility: CLINIC | Age: 69
End: 2019-04-24
Payer: COMMERCIAL

## 2019-04-24 ENCOUNTER — HOSPITAL ENCOUNTER (OUTPATIENT)
Facility: CLINIC | Age: 69
Discharge: HOME | End: 2019-04-24
Attending: ORTHOPAEDIC SURGERY | Admitting: ORTHOPAEDIC SURGERY
Payer: COMMERCIAL

## 2019-04-24 ENCOUNTER — ANESTHESIA EVENT (OUTPATIENT)
Dept: SURGERY | Facility: CLINIC | Age: 69
End: 2019-04-24
Payer: COMMERCIAL

## 2019-04-24 VITALS
HEIGHT: 61 IN | TEMPERATURE: 96.1 F | SYSTOLIC BLOOD PRESSURE: 133 MMHG | OXYGEN SATURATION: 99 % | HEART RATE: 81 BPM | RESPIRATION RATE: 12 BRPM | WEIGHT: 120.6 LBS | BODY MASS INDEX: 22.77 KG/M2 | DIASTOLIC BLOOD PRESSURE: 79 MMHG

## 2019-04-24 DIAGNOSIS — M20.12 HALLUX VALGUS OF LEFT FOOT: Primary | ICD-10-CM

## 2019-04-24 PROCEDURE — 36000058 ZZH SURGERY LEVEL 3 EA 15 ADDTL MIN: Performed by: ORTHOPAEDIC SURGERY

## 2019-04-24 PROCEDURE — 27110028 ZZH OR GENERAL SUPPLY NON-STERILE: Performed by: ORTHOPAEDIC SURGERY

## 2019-04-24 PROCEDURE — 25000125 ZZHC RX 250: Performed by: ORTHOPAEDIC SURGERY

## 2019-04-24 PROCEDURE — 71000027 ZZH RECOVERY PHASE 2 EACH 15 MINS: Performed by: ORTHOPAEDIC SURGERY

## 2019-04-24 PROCEDURE — 37000008 ZZH ANESTHESIA TECHNICAL FEE, 1ST 30 MIN: Performed by: ORTHOPAEDIC SURGERY

## 2019-04-24 PROCEDURE — 27810169 ZZH OR IMPLANT GENERAL: Performed by: ORTHOPAEDIC SURGERY

## 2019-04-24 PROCEDURE — 25000128 H RX IP 250 OP 636: Performed by: ORTHOPAEDIC SURGERY

## 2019-04-24 PROCEDURE — 37000009 ZZH ANESTHESIA TECHNICAL FEE, EACH ADDTL 15 MIN: Performed by: ORTHOPAEDIC SURGERY

## 2019-04-24 PROCEDURE — 25000125 ZZHC RX 250: Performed by: NURSE ANESTHETIST, CERTIFIED REGISTERED

## 2019-04-24 PROCEDURE — 25000566 ZZH SEVOFLURANE, EA 15 MIN: Performed by: ORTHOPAEDIC SURGERY

## 2019-04-24 PROCEDURE — 36000056 ZZH SURGERY LEVEL 3 1ST 30 MIN: Performed by: ORTHOPAEDIC SURGERY

## 2019-04-24 PROCEDURE — 25000128 H RX IP 250 OP 636: Performed by: ANESTHESIOLOGY

## 2019-04-24 PROCEDURE — 25800030 ZZH RX IP 258 OP 636: Performed by: NURSE ANESTHETIST, CERTIFIED REGISTERED

## 2019-04-24 PROCEDURE — 71000012 ZZH RECOVERY PHASE 1 LEVEL 1 FIRST HR: Performed by: ORTHOPAEDIC SURGERY

## 2019-04-24 PROCEDURE — 25000128 H RX IP 250 OP 636: Performed by: NURSE ANESTHETIST, CERTIFIED REGISTERED

## 2019-04-24 PROCEDURE — 40000170 ZZH STATISTIC PRE-PROCEDURE ASSESSMENT II: Performed by: ORTHOPAEDIC SURGERY

## 2019-04-24 PROCEDURE — 27210794 ZZH OR GENERAL SUPPLY STERILE: Performed by: ORTHOPAEDIC SURGERY

## 2019-04-24 DEVICE — IMP KIT PIN BIOM ORTHOSORB 1.3X104MM K-WIRE 110010745: Type: IMPLANTABLE DEVICE | Site: TOE | Status: FUNCTIONAL

## 2019-04-24 DEVICE — IMP WIRE KIRSCHNER 0.054X4" 78.2040: Type: IMPLANTABLE DEVICE | Site: TOE | Status: FUNCTIONAL

## 2019-04-24 RX ORDER — LIDOCAINE 40 MG/G
CREAM TOPICAL
Status: DISCONTINUED | OUTPATIENT
Start: 2019-04-24 | End: 2019-04-24 | Stop reason: HOSPADM

## 2019-04-24 RX ORDER — SODIUM CHLORIDE, SODIUM LACTATE, POTASSIUM CHLORIDE, CALCIUM CHLORIDE 600; 310; 30; 20 MG/100ML; MG/100ML; MG/100ML; MG/100ML
INJECTION, SOLUTION INTRAVENOUS CONTINUOUS
Status: DISCONTINUED | OUTPATIENT
Start: 2019-04-24 | End: 2019-04-24 | Stop reason: HOSPADM

## 2019-04-24 RX ORDER — OXYCODONE HYDROCHLORIDE 5 MG/1
5-10 TABLET ORAL
Qty: 40 TABLET | Refills: 0 | Status: SHIPPED | OUTPATIENT
Start: 2019-04-24 | End: 2019-11-07

## 2019-04-24 RX ORDER — IBUPROFEN 600 MG/1
600 TABLET, FILM COATED ORAL EVERY 6 HOURS PRN
Status: DISCONTINUED | OUTPATIENT
Start: 2019-04-24 | End: 2019-04-24 | Stop reason: HOSPADM

## 2019-04-24 RX ORDER — KETOROLAC TROMETHAMINE 30 MG/ML
INJECTION, SOLUTION INTRAMUSCULAR; INTRAVENOUS PRN
Status: DISCONTINUED | OUTPATIENT
Start: 2019-04-24 | End: 2019-04-24

## 2019-04-24 RX ORDER — HYDROMORPHONE HYDROCHLORIDE 1 MG/ML
.3-.5 INJECTION, SOLUTION INTRAMUSCULAR; INTRAVENOUS; SUBCUTANEOUS EVERY 10 MIN PRN
Status: DISCONTINUED | OUTPATIENT
Start: 2019-04-24 | End: 2019-04-24 | Stop reason: HOSPADM

## 2019-04-24 RX ORDER — ONDANSETRON 4 MG/1
4 TABLET, ORALLY DISINTEGRATING ORAL EVERY 6 HOURS PRN
Status: DISCONTINUED | OUTPATIENT
Start: 2019-04-24 | End: 2019-04-24 | Stop reason: HOSPADM

## 2019-04-24 RX ORDER — LIDOCAINE HYDROCHLORIDE 20 MG/ML
INJECTION, SOLUTION INFILTRATION; PERINEURAL PRN
Status: DISCONTINUED | OUTPATIENT
Start: 2019-04-24 | End: 2019-04-24

## 2019-04-24 RX ORDER — PROPOFOL 10 MG/ML
INJECTION, EMULSION INTRAVENOUS PRN
Status: DISCONTINUED | OUTPATIENT
Start: 2019-04-24 | End: 2019-04-24

## 2019-04-24 RX ORDER — FENTANYL CITRATE 50 UG/ML
INJECTION, SOLUTION INTRAMUSCULAR; INTRAVENOUS PRN
Status: DISCONTINUED | OUTPATIENT
Start: 2019-04-24 | End: 2019-04-24

## 2019-04-24 RX ORDER — ONDANSETRON 2 MG/ML
4 INJECTION INTRAMUSCULAR; INTRAVENOUS EVERY 30 MIN PRN
Status: DISCONTINUED | OUTPATIENT
Start: 2019-04-24 | End: 2019-04-24 | Stop reason: HOSPADM

## 2019-04-24 RX ORDER — FENTANYL CITRATE 50 UG/ML
25-50 INJECTION, SOLUTION INTRAMUSCULAR; INTRAVENOUS
Status: DISCONTINUED | OUTPATIENT
Start: 2019-04-24 | End: 2019-04-24 | Stop reason: HOSPADM

## 2019-04-24 RX ORDER — PROCHLORPERAZINE MALEATE 5 MG
5 TABLET ORAL EVERY 6 HOURS PRN
Status: DISCONTINUED | OUTPATIENT
Start: 2019-04-24 | End: 2019-04-24 | Stop reason: HOSPADM

## 2019-04-24 RX ORDER — CEFAZOLIN SODIUM 2 G/100ML
2 INJECTION, SOLUTION INTRAVENOUS
Status: COMPLETED | OUTPATIENT
Start: 2019-04-24 | End: 2019-04-24

## 2019-04-24 RX ORDER — SODIUM CHLORIDE, SODIUM LACTATE, POTASSIUM CHLORIDE, CALCIUM CHLORIDE 600; 310; 30; 20 MG/100ML; MG/100ML; MG/100ML; MG/100ML
INJECTION, SOLUTION INTRAVENOUS CONTINUOUS PRN
Status: DISCONTINUED | OUTPATIENT
Start: 2019-04-24 | End: 2019-04-24

## 2019-04-24 RX ORDER — ONDANSETRON 2 MG/ML
INJECTION INTRAMUSCULAR; INTRAVENOUS PRN
Status: DISCONTINUED | OUTPATIENT
Start: 2019-04-24 | End: 2019-04-24

## 2019-04-24 RX ORDER — PROPOFOL 10 MG/ML
INJECTION, EMULSION INTRAVENOUS CONTINUOUS PRN
Status: DISCONTINUED | OUTPATIENT
Start: 2019-04-24 | End: 2019-04-24

## 2019-04-24 RX ORDER — EPHEDRINE SULFATE 50 MG/ML
INJECTION, SOLUTION INTRAMUSCULAR; INTRAVENOUS; SUBCUTANEOUS PRN
Status: DISCONTINUED | OUTPATIENT
Start: 2019-04-24 | End: 2019-04-24

## 2019-04-24 RX ORDER — NALOXONE HYDROCHLORIDE 0.4 MG/ML
.1-.4 INJECTION, SOLUTION INTRAMUSCULAR; INTRAVENOUS; SUBCUTANEOUS
Status: DISCONTINUED | OUTPATIENT
Start: 2019-04-24 | End: 2019-04-24 | Stop reason: HOSPADM

## 2019-04-24 RX ORDER — BUPIVACAINE HYDROCHLORIDE 2.5 MG/ML
INJECTION, SOLUTION INFILTRATION; PERINEURAL PRN
Status: DISCONTINUED | OUTPATIENT
Start: 2019-04-24 | End: 2019-04-24 | Stop reason: HOSPADM

## 2019-04-24 RX ORDER — DEXAMETHASONE SODIUM PHOSPHATE 4 MG/ML
INJECTION, SOLUTION INTRA-ARTICULAR; INTRALESIONAL; INTRAMUSCULAR; INTRAVENOUS; SOFT TISSUE PRN
Status: DISCONTINUED | OUTPATIENT
Start: 2019-04-24 | End: 2019-04-24

## 2019-04-24 RX ORDER — ONDANSETRON 2 MG/ML
4 INJECTION INTRAMUSCULAR; INTRAVENOUS EVERY 6 HOURS PRN
Status: DISCONTINUED | OUTPATIENT
Start: 2019-04-24 | End: 2019-04-24 | Stop reason: HOSPADM

## 2019-04-24 RX ORDER — CEFAZOLIN SODIUM 1 G/3ML
1 INJECTION, POWDER, FOR SOLUTION INTRAMUSCULAR; INTRAVENOUS SEE ADMIN INSTRUCTIONS
Status: DISCONTINUED | OUTPATIENT
Start: 2019-04-24 | End: 2019-04-24 | Stop reason: HOSPADM

## 2019-04-24 RX ORDER — OXYCODONE HYDROCHLORIDE 5 MG/1
5-10 TABLET ORAL
Status: DISCONTINUED | OUTPATIENT
Start: 2019-04-24 | End: 2019-04-24 | Stop reason: HOSPADM

## 2019-04-24 RX ORDER — KETOROLAC TROMETHAMINE 15 MG/ML
15 INJECTION, SOLUTION INTRAMUSCULAR; INTRAVENOUS EVERY 6 HOURS
Status: DISCONTINUED | OUTPATIENT
Start: 2019-04-24 | End: 2019-04-24 | Stop reason: HOSPADM

## 2019-04-24 RX ORDER — MEPERIDINE HYDROCHLORIDE 25 MG/ML
12.5 INJECTION INTRAMUSCULAR; INTRAVENOUS; SUBCUTANEOUS
Status: DISCONTINUED | OUTPATIENT
Start: 2019-04-24 | End: 2019-04-24 | Stop reason: HOSPADM

## 2019-04-24 RX ORDER — ONDANSETRON 4 MG/1
4 TABLET, ORALLY DISINTEGRATING ORAL EVERY 30 MIN PRN
Status: DISCONTINUED | OUTPATIENT
Start: 2019-04-24 | End: 2019-04-24 | Stop reason: HOSPADM

## 2019-04-24 RX ORDER — CEPHALEXIN 500 MG/1
500 CAPSULE ORAL 3 TIMES DAILY
Qty: 6 CAPSULE | Refills: 0 | Status: SHIPPED | OUTPATIENT
Start: 2019-04-24 | End: 2019-04-26

## 2019-04-24 RX ORDER — HYDROMORPHONE HYDROCHLORIDE 1 MG/ML
0.2 INJECTION, SOLUTION INTRAMUSCULAR; INTRAVENOUS; SUBCUTANEOUS
Status: DISCONTINUED | OUTPATIENT
Start: 2019-04-24 | End: 2019-04-24 | Stop reason: HOSPADM

## 2019-04-24 RX ORDER — IBUPROFEN 600 MG/1
600 TABLET, FILM COATED ORAL EVERY 6 HOURS PRN
Qty: 60 TABLET | Refills: 0 | Status: SHIPPED | OUTPATIENT
Start: 2019-04-24 | End: 2021-01-16

## 2019-04-24 RX ADMIN — Medication 5 MG: at 10:28

## 2019-04-24 RX ADMIN — PROPOFOL 25 MCG/KG/MIN: 10 INJECTION, EMULSION INTRAVENOUS at 09:57

## 2019-04-24 RX ADMIN — FENTANYL CITRATE 50 MCG: 50 INJECTION, SOLUTION INTRAMUSCULAR; INTRAVENOUS at 09:54

## 2019-04-24 RX ADMIN — Medication 10 MG: at 10:58

## 2019-04-24 RX ADMIN — MIDAZOLAM 2 MG: 1 INJECTION INTRAMUSCULAR; INTRAVENOUS at 09:49

## 2019-04-24 RX ADMIN — FENTANYL CITRATE 50 MCG: 50 INJECTION, SOLUTION INTRAMUSCULAR; INTRAVENOUS at 10:24

## 2019-04-24 RX ADMIN — KETOROLAC TROMETHAMINE 15 MG: 30 INJECTION, SOLUTION INTRAMUSCULAR at 11:01

## 2019-04-24 RX ADMIN — PHENYLEPHRINE HYDROCHLORIDE 100 MCG: 10 INJECTION, SOLUTION INTRAMUSCULAR; INTRAVENOUS; SUBCUTANEOUS at 10:46

## 2019-04-24 RX ADMIN — Medication 10 MG: at 10:31

## 2019-04-24 RX ADMIN — PHENYLEPHRINE HYDROCHLORIDE 100 MCG: 10 INJECTION, SOLUTION INTRAMUSCULAR; INTRAVENOUS; SUBCUTANEOUS at 10:02

## 2019-04-24 RX ADMIN — SODIUM CHLORIDE, POTASSIUM CHLORIDE, SODIUM LACTATE AND CALCIUM CHLORIDE: 600; 310; 30; 20 INJECTION, SOLUTION INTRAVENOUS at 11:02

## 2019-04-24 RX ADMIN — FENTANYL CITRATE 50 MCG: 50 INJECTION, SOLUTION INTRAMUSCULAR; INTRAVENOUS at 11:38

## 2019-04-24 RX ADMIN — DEXAMETHASONE SODIUM PHOSPHATE 4 MG: 4 INJECTION, SOLUTION INTRA-ARTICULAR; INTRALESIONAL; INTRAMUSCULAR; INTRAVENOUS; SOFT TISSUE at 09:58

## 2019-04-24 RX ADMIN — LIDOCAINE HYDROCHLORIDE 100 MG: 20 INJECTION, SOLUTION INFILTRATION; PERINEURAL at 09:54

## 2019-04-24 RX ADMIN — PHENYLEPHRINE HYDROCHLORIDE 100 MCG: 10 INJECTION, SOLUTION INTRAMUSCULAR; INTRAVENOUS; SUBCUTANEOUS at 10:18

## 2019-04-24 RX ADMIN — Medication 10 MG: at 09:55

## 2019-04-24 RX ADMIN — PHENYLEPHRINE HYDROCHLORIDE 100 MCG: 10 INJECTION, SOLUTION INTRAMUSCULAR; INTRAVENOUS; SUBCUTANEOUS at 10:10

## 2019-04-24 RX ADMIN — ONDANSETRON 4 MG: 2 INJECTION INTRAMUSCULAR; INTRAVENOUS at 10:44

## 2019-04-24 RX ADMIN — PHENYLEPHRINE HYDROCHLORIDE 100 MCG: 10 INJECTION, SOLUTION INTRAMUSCULAR; INTRAVENOUS; SUBCUTANEOUS at 10:40

## 2019-04-24 RX ADMIN — PHENYLEPHRINE HYDROCHLORIDE 100 MCG: 10 INJECTION, SOLUTION INTRAMUSCULAR; INTRAVENOUS; SUBCUTANEOUS at 10:54

## 2019-04-24 RX ADMIN — SODIUM CHLORIDE, POTASSIUM CHLORIDE, SODIUM LACTATE AND CALCIUM CHLORIDE: 600; 310; 30; 20 INJECTION, SOLUTION INTRAVENOUS at 09:47

## 2019-04-24 RX ADMIN — CEFAZOLIN SODIUM 2 G: 2 INJECTION, SOLUTION INTRAVENOUS at 09:57

## 2019-04-24 RX ADMIN — PROPOFOL 180 MG: 10 INJECTION, EMULSION INTRAVENOUS at 09:54

## 2019-04-24 ASSESSMENT — MIFFLIN-ST. JEOR: SCORE: 999.42

## 2019-04-24 ASSESSMENT — ENCOUNTER SYMPTOMS: DYSRHYTHMIAS: 1

## 2019-04-24 NOTE — ANESTHESIA POSTPROCEDURE EVALUATION
Patient: Dorie Gagnon    Procedure(s):  LEFT HALLIX VAGUS AND SECOND CLAWTOE RECONSTRUCTION    Diagnosis:LEFT HALLIX VALGUS AND CLAWTOE DEFORMITY  Diagnosis Additional Information: No value filed.    Anesthesia Type:  General, LMA    Note:  Anesthesia Post Evaluation    Patient location during evaluation: bedside  Patient participation: Able to fully participate in evaluation  Level of consciousness: awake  Pain management: adequate  Airway patency: patent  Cardiovascular status: acceptable  Respiratory status: acceptable  Hydration status: acceptable  PONV: none     Anesthetic complications: None    Comments: No anesthetic complications noted.         Last vitals:  Vitals:    04/24/19 1200 04/24/19 1210 04/24/19 1300   BP: 127/77 129/80 133/79   Pulse: 81     Resp: 17 12    Temp: 35.8  C (96.4  F) 35.6  C (96.1  F)    SpO2: 93% 93% 99%         Electronically Signed By: Timi Belle DO, DO  April 24, 2019  3:38 PM

## 2019-04-24 NOTE — DISCHARGE INSTRUCTIONS
Today you received Toradol, an antiinflammatory medication similar to Ibuprofen.  You should not take other antiinflammatory medication, such as Ibuprofen, Motrin, Advil, Aleve, Naprosyn, etc until 5:00 pm today.         Same Day Surgery Discharge Instructions for  Sedation and General Anesthesia       It's not unusual to feel dizzy, light-headed or faint for up to 24 hours after surgery or while taking pain medication.  If you have these symptoms: sit for a few minutes before standing and have someone assist you when you get up to walk or use the bathroom.      You should rest and relax for the next 24 hours. We recommend you make arrangements to have an adult stay with you for at least 24 hours after your discharge.  Avoid hazardous and strenuous activity.      DO NOT DRIVE any vehicle or operate mechanical equipment for 24 hours following the end of your surgery.  Even though you may feel normal, your reactions may be affected by the medication you have received.      Do not drink alcoholic beverages for 24 hours following surgery.       Slowly progress to your regular diet as you feel able. It's not unusual to feel nauseated and/or vomit after receiving anesthesia.  If you develop these symptoms, drink clear liquids (apple juice, ginger ale, broth, 7-up, etc. ) until you feel better.  If your nausea and vomiting persists for 24 hours, please notify your surgeon.        All narcotic pain medications, along with inactivity and anesthesia, can cause constipation. Drinking plenty of liquids and increasing fiber intake will help.      For any questions of a medical nature, call your surgeon.      Do not make important decisions for 24 hours.      If you had general anesthesia, you may have a sore throat for a couple of days related to the breathing tube used during surgery.  You may use Cepacol lozenges to help with this discomfort.  If it worsens or if you develop a fever, contact your surgeon.       If you feel  "your pain is not well managed with the pain medications prescribed by your surgeon, please contact your surgeon's office to let them know so they can address your concerns.             Post-Operative Instructions Foot Surgery Patients  Tyler Jameson M.D.    Board Certified  Fellowship, Foot and Ankle Surgery  Member, American Academy of Orthopaedic Surgeons  American Orthopaedic Foot and Ankle Society    Direct Phone 9:00am to 5:00pm [phone]  After Hours/24 Hour On Call [phone]      Diet:  ? Take all prescribed medications with food   ? Narcotic pain medication can cause constipation  ? Avoid alcoholic beverages while taking pain medications   ? Increase dietary fiber, protein rich foods, fluids post operatively    Activity:  ? Elevate operative foot 90% of the time.  ? \"Swelling runs downhill\" - the more you elevate, the less permanent swelling you will experience  ? Post Op shoe/sandal must be on at all times with weight bearing  ? Unless told otherwise, you may put full weight on your foot  ? Walk with a flat foot  ? Use crutches/walker/cane as needed for balance and comfort  ? Do NOT walk on your heel, this pulls against possible pins/screws in your toes  ? You may be up to the bathroom, to the kitchen for meals and to change locations in the house.  ? You may do sit-ups (NOT BONE GRAFT PATIENTS), leg raises, upper body exercises  ? Every time you see a commercial on TV, change a web page or book page, perform ANKLE PUMPS  ? ANKLE PUMPS helps prevent a blood clot, pump swelling back to you heart  ? Adjust your immobilized foot/ankle to relieve pressure on your heel  ? Do not try to wiggle your toes    Special Care Instructions:     ? DO NOT CHANGE OR ALTER THE DRESSING  ? Keep your dressing(s) dry;    ? Sponge bath or wrap seal your foot (with shoe on) for shower  ? It is normal to have some incisional drainage  ? It is not unusual to have some \"numbness\" in foot and ankle following " surgery  ? Smoking should be avoided.  It will interfere in your healing.  ? Check pins daily:  ? Do not push pin in if it comes out  ? Do not pull pin out if it goes in          Report to your Doctor if any of the following occur:  ? Elevated temperature, 101o or higher  ? Increased pain unrelieved by pain medication and/or elevation  ? Tight/loose/wet dressing  ? Increased swelling  ? Calf pain  ? Pin drainage  ? Pin migration    Pin out over 1  from tip of toe to tip of white cap    White cap comes off    White cap is pushing on tip of toe (no metal showing)  ? For any shortness of breath, DIAL 911, go to the Emergency Room      Medications:  ? Take all of the following medications with food.    ? Aspirin/Ecotrin 325 mg.:  1 tab 1x/day  ? This is for blood clot prevention  ? If you have sensitive stomach, Ecotrin can be less irritating  ? If you experience any unusual bruising or bleeding or ringing in the ears, stop this medication and call us  ? Oxycodone  1-2 every 3-4 hours as needed for pain  ? You may take 1 tablet with plain Tylenol or Ibuprofen for less severe pain or to decrease nausea/mental effect  ?  Hydrocodone  1-2 every 3-4 hours as needed for pain  ? You may take 1 tablet with plain Tylenol or Ibuprofen for less severe pain or to decrease nausea/mental effects  ? Ibuprofen 1 every 6 hours as needed for inflammatory pain        Your Next Appointment:    ? Appt. scheduled for ___Wed 5/8/19____3:30 pm________________________        Direct Phone 9:00am to 5:00pm [phone]    After Hours/24 Hour On Call [phone]

## 2019-04-24 NOTE — ANESTHESIA CARE TRANSFER NOTE
Patient: Dorie Gagnon    Procedure(s):  LEFT HALLIX VAGUS AND SECOND CLAWTOE RECONSTRUCTION    Diagnosis: LEFT HALLIX VALGUS AND CLAWTOE DEFORMITY  Diagnosis Additional Information: No value filed.    Anesthesia Type:   General, LMA     Note:  Airway :Face Mask  Patient transferred to:PACU  Comments: At end of procedure, spontaneous respirations, adequate tidal volumes, followed commands to voice, LMA removed atraumatically, oropharynx suctioned, airway patent after LMA removal. Oxygen via facemask at 8 liters per minute to PACU. Oxygen tubing connected to wall O2 in PACU, SpO2, NiBP, and EKG monitors and alarms on and functioning, Penny Hugger warmer connected to patient gown, report on patient's clinical status given to PACU RN, RN questions answered.Handoff Report: Identifed the Patient, Identified the Reponsible Provider, Reviewed the pertinent medical history, Discussed the surgical course, Reviewed Intra-OP anesthesia mangement and issues during anesthesia, Set expectations for post-procedure period and Allowed opportunity for questions and acknowledgement of understanding      Vitals: (Last set prior to Anesthesia Care Transfer)    CRNA VITALS  4/24/2019 1039 - 4/24/2019 1114      4/24/2019             Pulse:  93    SpO2:  100 %    Resp Rate (set):  10                Electronically Signed By: CHRISTIAN Muhammad CRNA  April 24, 2019  11:14 AM

## 2019-04-24 NOTE — ANESTHESIA PREPROCEDURE EVALUATION
Anesthesia Pre-Procedure Evaluation    Patient: Dorie Gagnon   MRN: [id number] : 1950          Preoperative Diagnosis: LEFT HALLIX VALGUS AND CLAWTOE DEFORMITY    Procedure(s):  LEFT HALLIX VAGUS AND SECOND CLAWTOE RECONSTRUCTION    Past Medical History:   Diagnosis Date     Allergy to cats     nasal symptoms     Family history of ischemic heart disease     parents and brother with CAD onset age 60s     Hidradenitis     recurrent, groin     Influenza with other manifestations     hospitalized     Menopause     age 43     Mixed hyperlipidemia      Osteopenia 2003 FRAX: 12% risk major osteoporotic fx, 2.2% risk hip fx     Status post bunionectomy 2012    right with hammertoe repairs     Past Surgical History:   Procedure Laterality Date     BUNIONECTOMY KATIE  2012    Procedure:BUNIONECTOMY KATIE; RIGHT FOOT EULA KATIE BUNIONECTOMY, HAMMER TOE RECONSTRUCTION DIGITS TWO, THREE, FOUR AND FIVE; Surgeon:DEANDRE MONET; Location: [facility]     C DEXA INTERPRETATION, AXIAL  2003    T score femur -1.3, lumbar -1.0     C DEXA INTERPRETATION, AXIAL  2004    T score lumbar -1.2, femoral -1.4     C DEXA INTERPRETATION, AXIAL  2007    T score lumbar -1.5 (decline -2.8%/yr), femoral neck -1.3/-1.8 (decline -1.8%/yr)     C DEXA INTERPRETATION, AXIAL  2014    T score L1-4 of -1.4 (dec 2.7%), femoral neck -1.9/-2.2 with BMD 0.728 (dec 0.8%)     COLONOSCOPY       COLONOSCOPY  2000    normal, repeat 10 years     COLONOSCOPY  10/2010    normal, repeat 5 years     DEXA  10/2010    T score lumbar -1.2(stable), femoral neck -1.8/-2.2 with BMD 0.755 (6% worsening)     HC TOOTH EXTRACTION W/FORCEP      wisdom teeth extracted     REPAIR HAMMER TOE  2012    Procedure:REPAIR HAMMER TOE; Surgeon:DEANDRE MONTE; Location: [facility]     STRESS ECHO  10/2010    normal LV function at rest, hyperdynamic with exercise, EF 55-60% at rest and 70% post exercise     STRESS ECHO  10/2010     negative for echo/EKG changes of ischemia, normal LV fct with EF 55-60%, systolic fct hyperdynamic with exercise, exercised 7 min, EK.5 mm upsloping ST depression inf lead     STRESS ECHO ([facility])  2004    negative, exercise 8-9 min       Anesthesia Evaluation     .             ROS/MED HX    ENT/Pulmonary:     (+), recent URI resolved 3wks ago. Finished [employer]. Improved: . .    Neurologic:       Cardiovascular:     (+) Dyslipidemia, ----. : . . . :. dysrhythmias PVCs, . Previous cardiac testing date:results:Stress Testdate: results:EF 55-60%  No inducible ischemia date: results: date: results:          METS/Exercise Tolerance:     Hematologic:         Musculoskeletal:         GI/Hepatic:     (+) GERD       Renal/Genitourinary:  - ROS Renal section negative       Endo:  - neg endo ROS       Psychiatric:         Infectious Disease:  - neg infectious disease ROS       Malignancy:         Other:                          Physical Exam  Normal systems: cardiovascular, pulmonary and dental    Airway   Mallampati: II  TM distance: >3 FB  Neck ROM: full    Dental     Cardiovascular       Pulmonary             Lab Results   Component Value Date    WBC 5.0 2017    HGB 13.0 2018    HCT 39.0 2017     2017    CRP 3.3 09/15/2016     2018    POTASSIUM 3.9 2018    CHLORIDE 102 2018    CO2 25 2018    BUN 11 2018    CR 0.71 2018    GLC 92 2018    KARLA 9.5 2018    MAG 2.2 2015    ALBUMIN 4.2 2018    PROTTOTAL 7.6 2018    ALT 25 2018    AST 27 2018    ALKPHOS 48 2018    BILITOTAL 0.5 2018    TSH 1.39 09/15/2016       Preop Vitals  BP Readings from Last 3 Encounters:   19 138/86   04/15/19 132/76   18 147/83    Pulse Readings from Last 3 Encounters:   19 86   04/15/19 94   18 76      Resp Readings from Last 3 Encounters:   16 16   06/25/15 18   13 16    SpO2 Readings  "from Last 3 Encounters:   04/22/19 96%   04/15/19 96%   11/19/18 97%      Temp Readings from Last 1 Encounters:   04/22/19 36.6  C (97.8  F) (Tympanic)    Ht Readings from Last 1 Encounters:   04/22/19 1.549 m (5' 1\")      Wt Readings from Last 1 Encounters:   04/22/19 54.4 kg (120 lb)    Estimated body mass index is 22.67 kg/m  as calculated from the following:    Height as of 4/22/19: 1.549 m (5' 1\").    Weight as of 4/22/19: 54.4 kg (120 lb).       Anesthesia Plan      History & Physical Review  History and physical reviewed and following examination; no interval change.    ASA Status:  2 .    NPO Status:  > 8 hours    Plan for General and LMA with Intravenous induction. Maintenance will be Balanced.    PONV prophylaxis:  Ondansetron (or other 5HT-3) and Dexamethasone or Solumedrol       Postoperative Care  Postoperative pain management:  IV analgesics.      Consents  Anesthetic plan, risks, benefits and alternatives discussed with:  Patient..                 Timi Belle, DO, DO  "

## 2019-04-24 NOTE — OP NOTE
Procedure Date: 04/24/2019      PREOPERATIVE DIAGNOSES:   1.  Left metatarsus primus varus, hallux valgus.   2.  Left second clawtoe.      POSTOPERATIVE DIAGNOSES:   1.  Left metatarsus primus varus, hallux valgus.   2.  Left second clawtoe.      PROCEDURES:   1.  DuVries resection arthroplasty, left second toe proximal interphalangeal joint.   2.  Left distal first metatarsal Chevron osteotomy.   3.  Left proximal phalangeal Akin osteotomy.      DESCRIPTION OF PROCEDURE:  After adequate general anesthetic was obtained, the patient's left foot and ankle were prepped and draped in the usual sterile fashion.  A thigh tourniquet was utilized.  Limb was exsanguinated and tourniquet raised.  We turned our attention first to the fixed left second claw toe.  A transverse elliptical skin incision was made over the dorsal aspect of the second toe PIP joint.  It was carried down through the subcutaneous tissue.  Ellipse of skin and extensor tendon were removed.  Collateral ligaments were released.  Distal aspect of the proximal phalanx was brought up through the wound and resected utilizing the Micro-Aire microsagittal saw.  This provided good decompression of the fixed flexion deformity.  Wounds were then thoroughly irrigated with antibiotic solution.  Intramedullary K-wire driven antegrade and then retrograde across the interphalangeal joints into the corresponding metatarsal.  Extensor tendon was repaired with interrupted 2-0 Vicryl, skin with 3-0 Prolene vertical mattress stitch.  Marcaine 0.25% was instilled at the base of the digit.      We then turned our attention to the hallux valgus deformity.  A straight medial longitudinal skin incision was made over the first MTP joint, carried down through the subcutaneous tissue, taking care to protect the sensory nerves.  Capsule was incised in line with the skin incision and subperiosteally stripped off of the medial aspect of the joint.  Moderate arthritic changes were  noted.  Full-thickness cartilaginous loss in the inferior medial quadrant.  Utilizing the Micro-Aire microsagittal saw, the prominent medial exostosis was removed in line with the medial border of the foot.  A 60-degree Chevron osteotomy was performed.  Distal fragment was translated 4 mm and impacted.  It was found to be quite stable.  A 0.052 OrthoSorb pin was then placed across the osteotomy site for the securing fixation.  We then turned our attention to the proximal phalanx.  A closing wedge osteotomy was performed 6 mm distal to the joint surface.  This was secured with one extra-large Ligaclip in a stapled fashion.  Care was taken to make sure the staple was not intra-articular.  Wound was thoroughly irrigated with antibiotic solution.  Redundant capsule excised dorsally.  Capsule closed with interrupted 2-0 Vicryl; skin closed with 3-0 Prolene vertical mattress stitch.  Marcaine 0.25% was instilled along the incision line.  Sterile Henri dressing was applied.  Tourniquet was released after 45 minutes and with good capillary refill.  The patient was taken to the recovery room in good condition.         JAIME CARRENO MD             D: 2019   T: 2019   MT:       Name:     RONNY VARGAS   MRN:      [id number]        Account:        [id number]   :      1950           Procedure Date: 2019      Document: [id number]       cc: Jaime Carreno MD

## 2019-04-24 NOTE — OR NURSING
PNDS met, po per I&O sheet. Pt dressed, up in recliner and transported to Phase 2.  Post-Op shoe in place.  Weight bearing as tolerated per Dr Catina Jameson's order.

## 2019-10-02 ENCOUNTER — HEALTH MAINTENANCE LETTER (OUTPATIENT)
Age: 69
End: 2019-10-02

## 2019-11-07 ENCOUNTER — OFFICE VISIT (OUTPATIENT)
Dept: FAMILY MEDICINE | Facility: CLINIC | Age: 69
End: 2019-11-07
Payer: COMMERCIAL

## 2019-11-07 VITALS
TEMPERATURE: 98.2 F | HEIGHT: 61 IN | OXYGEN SATURATION: 97 % | SYSTOLIC BLOOD PRESSURE: 134 MMHG | WEIGHT: 127 LBS | DIASTOLIC BLOOD PRESSURE: 70 MMHG | HEART RATE: 85 BPM | BODY MASS INDEX: 23.98 KG/M2

## 2019-11-07 DIAGNOSIS — E78.5 HYPERLIPIDEMIA, UNSPECIFIED HYPERLIPIDEMIA TYPE: Chronic | ICD-10-CM

## 2019-11-07 DIAGNOSIS — Z23 NEED FOR PROPHYLACTIC VACCINATION AND INOCULATION AGAINST INFLUENZA: ICD-10-CM

## 2019-11-07 DIAGNOSIS — Z00.00 ROUTINE HISTORY AND PHYSICAL EXAMINATION OF ADULT: Primary | ICD-10-CM

## 2019-11-07 DIAGNOSIS — Z12.31 BREAST CANCER SCREENING BY MAMMOGRAM: ICD-10-CM

## 2019-11-07 PROCEDURE — 90662 IIV NO PRSV INCREASED AG IM: CPT | Performed by: FAMILY MEDICINE

## 2019-11-07 PROCEDURE — G0008 ADMIN INFLUENZA VIRUS VAC: HCPCS | Performed by: FAMILY MEDICINE

## 2019-11-07 PROCEDURE — 99397 PER PM REEVAL EST PAT 65+ YR: CPT | Mod: 25 | Performed by: FAMILY MEDICINE

## 2019-11-07 RX ORDER — PRAVASTATIN SODIUM 40 MG
TABLET ORAL
Qty: 90 TABLET | Refills: 3 | Status: SHIPPED | OUTPATIENT
Start: 2019-11-07 | End: 2021-01-14

## 2019-11-07 ASSESSMENT — ACTIVITIES OF DAILY LIVING (ADL): CURRENT_FUNCTION: NO ASSISTANCE NEEDED

## 2019-11-07 ASSESSMENT — MIFFLIN-ST. JEOR: SCORE: 1028.45

## 2019-11-07 NOTE — PATIENT INSTRUCTIONS
Patient Education     Prevention Guidelines, Women Ages 65 and Older  Screening tests and vaccines are an important part of managing your health. A screening test is done to find possible disorders or diseases in people who don't have any symptoms. The goal is to find a disease early so lifestyle changes can be made and you can be watched more closely to reduce the risk of disease, or to detect it early enough to treat it most effectively. Screening tests are not considered diagnostic, but are used to determine if more testing is needed. Health counseling is essential, too. Below are guidelines for these, for women ages 65 and older. Talk with your healthcare provider to make sure you re up to date on what you need.  Screening Who needs it How often   Type 2 diabetes or prediabetes All women ages 40 to 75 who are overweight or obese At least every 3 years   Type 2 diabetes All women with prediabetes Every year   Alcohol misuse All women in this age group At routine exams   Blood pressure All women in this age group Yearly checkup if your blood pressure is normal*  Normal blood pressure is less than 120/80 mm Hg*  If your blood pressure reading is higher than normal, follow the advice of your healthcare provider   Breast cancer All women of average risk  There are no guidelines for breast cancer screening for 75 years and older.  Mammograms should be done every 1 or 2 years until age 75. At that point a woman should talk to her doctor about whether to continue screening. Talk to your doctor regarding your recommended frequency depending on your risk factors.   Cervical cancer Only women who had abnormal screening results before age 65 Talk with your healthcare provider   Chlamydia Women at increased risk for infection At routine exams   Colorectal cancer All women over the age of 50  This screening is advised against for women over 75 or if there is a life expectancy of less than 10 years.  Multiple tests are  available and are used at different times. Possible tests include: flexible sigmoidoscopy, colonoscopy, double-contrast barium enema, yearly fecal occult blood test, fecal immunochemical test, or stool DNA test as often as your healthcare provider advises. Talk with your healthcare provider about which tests are best for you.   Depression All women in this age group At routine exams   Gonorrhea Sexually active women at increased risk for infection At routine exams   Hepatitis C Anyone at increased risk; 1 time for those born between 1945 and 1965 At routine exams   High cholesterol or triglycerides All women in this age group who are at risk for coronary artery disease At least every 5 years   HIV Women at increased risk for infection-talk with your healthcare provider At routine exams   Lung cancer Adults ages 55 to 74 who have smoked with a 30-pack-a-year history and have smoked within the past 15 years  Annual low dose CT scan   Obesity All women in this age group At routine exams   Osteoporosis All women in this age group Bone density test at age 65, then follow-up as advised by your healthcare provider   Syphilis Women at increased risk for infection-talk with your healthcare provider At routine exams   Thyroid-Stimulating Hormone (TSH) All women in this age group with symptoms of thyroid dysfunction. There is not enough evidence to support TSH screening in women without symptoms.  ACOG recommendation is every 5 years; American Academy of [employer] Physicians concludes there is not enough evidence to support routine screening in adults without symptoms.    Tuberculosis Women at increased risk for infection-talk with your healthcare provider Ask your healthcare provider   Vision All women in this age group Every 1 to 2 years; if you have a chronic health condition, ask your healthcare provider if you need exams more often   Vaccine Who needs it How often   Chickenpox (varicella) All women in this age group who have  no record of this infection or vaccine 2 doses; second dose should be given at least 4 weeks after the first dose   Hepatitis A Women at increased risk for infection-talk with your healthcare provider 2 doses given 6 months apart   Hepatitis B Women at increased risk for infection-talk with your healthcare provider 3 doses over 6 months; second dose should be given 1 month after the first dose; the third dose should be given at least 2 months after the second dose and at least 4 months after the first dose   Haemophilus influenza Type B (HIB) Women at increased risk for infection-talk with your healthcare provider 1 to 3 doses   Influenza (flu) All women in this age group Once a year   Pneumococcal conjugate vaccine (PCV13) and pneumococcal polysaccharide vaccine (PPSV23) All women in this age group 1 dose of each vaccine   Tetanus/diphtheria/pertussis (Td/Tdap) booster All women in this age group Td every 10 years, or a one-time dose of Tdap instead of a Td booster after age 18, then Td every 10 years   Zoster All women in this age group 1 dose   Counseling Who needs it How often   Diet and exercise Women who are overweight or obese When diagnosed, and then at routine exams   Fall prevention (exercise and vitamin D supplements) All women in this age group At routine exams   Sexually transmitted infection prevention Women at increased risk for infection-talk with your healthcare provider At routine exams   Use of daily aspirin Talk to your healthcare provider about whether or not to start taking low-dose aspirin for the prevention of cardiovascular disease (CVD) and colorectal cancer in adults ages 60 to 69 who have at least a 10% risk of getting CVD within the next 10 years.  People who are not at increased risk for bleeding, have a life expectancy of at least 10 years, and are willing to take low-dose aspirin daily for at least 10 years are more likely to benefit. When the advantages of taking low-dose aspirin  outweigh the risks, people may choose to start taking a low-dose aspirin.  There is not enough data to support the use of aspirin in people over the age of 70.  When your risk is known   Use of tobacco and the health effects it can cause All women in this age group Every exam   Date Last Reviewed: 11/1/2017 2000-2018 [employer]. [address] [place] [address], [place] [address]. All rights reserved. This information is not intended as a substitute for professional medical care. Always follow your healthcare professional's instructions.

## 2019-11-07 NOTE — PROGRESS NOTES
"SUBJECTIVE:   Dorie Gagnon is a 69 year old female who presents for Preventive Visit.  click delete button to remove this line now  click delete button to remove this line now  Are you in the first 12 months of your Medicare coverage?  No    Healthy Habits:    In general, how would you rate your overall health?  Good    Frequency of exercise:  2-3 days/week    Duration of exercise:  15-30 minutes    Do you usually eat at least 4 servings of fruit and vegetables a day, include whole grains    & fiber and avoid regularly eating high fat or \"junk\" foods?  Yes    Taking medications regularly:  Yes    Barriers to taking medications:  None    Medication side effects:  None    Ability to successfully perform activities of daily living:  No assistance needed    Home Safety:  No safety concerns identified    Hearing Impairment:  No hearing concerns    In the past 6 months, have you been bothered by leaking of urine? Yes    In general, how would you rate your overall mental or emotional health?  Good      PHQ-2 Total Score:    Additional concerns today:  No         Do you feel safe in your environment? No    Have you ever done Advance Care Planning? (For example, a Health Directive, POLST, or a discussion with a medical provider or your loved ones about your wishes): Yes, patient states has an Advance Care Planning document and will bring a copy to the clinic.      Fall risk  Fallen 2 or more times in the past year?: No  Any fall with injury in the past year?: No  click delete button to remove this line now  Cognitive Screening   1) Repeat 3 items (Leader, Season, Table)    2) Clock draw: NORMAL  3) 3 item recall: Recalls 3 objects  Results: 3 items recalled: COGNITIVE IMPAIRMENT LESS LIKELY    Mini-CogTM Garth Chacon. Licensed by the author for use in [facility]; reprinted with permission (fely@.[email or web address]). All rights reserved.      Do you have sleep apnea, excessive snoring or daytime drowsiness?: " no    Reviewed and updated as needed this visit by clinical staff         Reviewed and updated as needed this visit by Provider        Social History     Tobacco Use     Smoking status: Never Smoker     Smokeless tobacco: Never Used   Substance Use Topics     Alcohol use: Yes     Comment: 1 wine per day     If you drink alcohol do you typically have >3 drinks per day or >7 drinks per week? No    Alcohol Use 11/19/2018   Prescreen: >3 drinks/day or >7 drinks/week? No   Prescreen: >3 drinks/day or >7 drinks/week? -   No flowsheet data found.        PROBLEMS TO ADD ON...    Hyperlipidemia Follow-Up      Are you having any of the following symptoms? (Select all that apply)  No complaints of shortness of breath, chest pain or pressure.  No increased sweating or nausea with activity.  No left-sided neck or arm pain.  No complaints of pain in calves when walking 1-2 blocks.    Are you regularly taking any medication or supplement to lower your cholesterol?   Yes- see epic     Are you having muscle aches or other side effects that you think could be caused by your cholesterol lowering medication?  No      Current providers sharing in care for this patient include:   Patient Care Team:  Lydia Durand MD as PCP - General (Family Practice)  Lydia Durand MD as Assigned PCP    The following health maintenance items are reviewed in Epic and correct as of today:  Health Maintenance   Topic Date Due     ZOSTER IMMUNIZATION (2 of 3) 08/15/2014     ADVANCE CARE PLANNING  08/16/2016     PHQ-2  01/01/2019     INFLUENZA VACCINE (1) 09/01/2019     MEDICARE ANNUAL WELLNESS VISIT  11/19/2019     FALL RISK ASSESSMENT  11/19/2019     COLONOSCOPY  10/05/2020     MAMMO SCREENING  11/19/2020     LIPID  11/20/2023     DTAP/TDAP/TD IMMUNIZATION (4 - Td) 01/18/2028     DEXA  Completed     HEPATITIS C SCREENING  Completed     PNEUMOCOCCAL IMMUNIZATION 65+ LOW/MEDIUM RISK  Completed     IPV IMMUNIZATION  Aged Out     MENINGITIS  IMMUNIZATION  Aged Out     Patient Active Problem List   Diagnosis     Family history of ischemic heart disease     Osteopenia     Hidradenitis     Hyperlipidemia, unspecified     Advanced directives, counseling/discussion     Family history of polyps in the colon     Palpitations     Acid indigestion     PVC's (premature ventricular contractions)     Hallux valgus of left foot     Past Surgical History:   Procedure Laterality Date     BUNIONECTOMY Left 2019    Procedure: LEFT HALLIX VAGUS AND SECOND CLAWTOE RECONSTRUCTION;  Surgeon: Tyler Jameson MD;  Location:  OR     BUNIONECTOMY KATIE  2012    Procedure:BUNIONECTOMY KATIE; RIGHT FOOT EULA KATIE BUNIONECTOMY, HAMMER TOE RECONSTRUCTION DIGITS TWO, THREE, FOUR AND FIVE; Surgeon:DEANDRE MONTE; Location: [facility]     C DEXA INTERPRETATION, AXIAL  2003    T score femur -1.3, lumbar -1.0     C DEXA INTERPRETATION, AXIAL  2004    T score lumbar -1.2, femoral -1.4     C DEXA INTERPRETATION, AXIAL  2007    T score lumbar -1.5 (decline -2.8%/yr), femoral neck -1.3/-1.8 (decline -1.8%/yr)     C DEXA INTERPRETATION, AXIAL  2014    T score L1-4 of -1.4 (dec 2.7%), femoral neck -1.9/-2.2 with BMD 0.728 (dec 0.8%)     COLONOSCOPY       COLONOSCOPY  2000    normal, repeat 10 years     COLONOSCOPY  10/2010    normal, repeat 5 years     DEXA  10/2010    T score lumbar -1.2(stable), femoral neck -1.8/-2.2 with BMD 0.755 (6% worsening)     HC TOOTH EXTRACTION W/FORCEP      wisdom teeth extracted     REPAIR HAMMER TOE  2012    Procedure:REPAIR HAMMER TOE; Surgeon:DEANDRE MONTE; Location: [facility]     STRESS ECHO  10/2010    normal LV function at rest, hyperdynamic with exercise, EF 55-60% at rest and 70% post exercise     STRESS ECHO  10/2010    negative for echo/EKG changes of ischemia, normal LV fct with EF 55-60%, systolic fct hyperdynamic with exercise, exercised 7 min, EK.5 mm upsloping ST depression inf lead     STRESS ECHO  (METRO)  2004    negative, exercise 8-9 min       Social History     Tobacco Use     Smoking status: Never Smoker     Smokeless tobacco: Never Used   Substance Use Topics     Alcohol use: Yes     Comment: 1 wine per day     Family History   Problem Relation Age of Onset     C.A.D. Father         MI age 61,   age 76 from MI     Diabetes Father         type 2     Cancer Father         esophagus, former smoker     Obesity Father      C.A.D. Mother         MI age 62     Lipids Mother      Blood Disease Mother         pernicious anemia     Thyroid Disease Mother         hypothyroidism     Eye Disorder Mother         macular degeneration     Osteoporosis Mother         foot fracture     Connective Tissue Disorder Mother         SLE - remission     Hypertension Mother      Hyperlipidemia Mother      Asthma Mother      Dementia Mother         memory loss, onset early 90s     C.A.D. Maternal Grandmother          early 80s     C.A.D. Maternal Grandfather          at younger age     C.A.D. Paternal Grandmother          late 70s     Hypertension Brother      Colon Polyps Brother         sister may also have colon polyps     Coronary Artery Disease Brother         angioplasty     Asthma Brother      Obesity Brother      Arrhythmia Brother      Hyperlipidemia Sister      Osteoporosis Sister      Colon Polyps Sister         possible         Pneumonia Vaccine:Adults age 65+ who received Pneumovax (PPSV23) at 65 years or older: Should be given PCV13 > 1 year after their most recent PPSV23  Mammogram Screening: Mammogram Screening: Patient over age 50, mutual decision to screen reflected in health maintenance.    Review of Systems  CONSTITUTIONAL: NEGATIVE for fever, chills, change in weight  INTEGUMENTARY/SKIN: NEGATIVE for worrisome rashes, moles or lesions  EYES: NEGATIVE for vision changes or irritation  ENT/MOUTH: NEGATIVE for ear, mouth and throat problems  RESP: NEGATIVE for significant cough or SOB  BREAST:  "NEGATIVE for masses, tenderness or discharge  CV: NEGATIVE for chest pain, palpitations or peripheral edema  GI: NEGATIVE for nausea, abdominal pain, heartburn, or change in bowel habits  : NEGATIVE for frequency, dysuria, or hematuria  MUSCULOSKELETAL: NEGATIVE for significant arthralgias or myalgia  NEURO: NEGATIVE for weakness, dizziness or paresthesias  ENDOCRINE: NEGATIVE for temperature intolerance, skin/hair changes  HEME: NEGATIVE for bleeding problems  PSYCHIATRIC: NEGATIVE for changes in mood or affect    OBJECTIVE:   There were no vitals taken for this visit. Estimated body mass index is 22.79 kg/m  as calculated from the following:    Height as of 4/24/19: 1.549 m (5' 1\").    Weight as of 4/24/19: 54.7 kg (120 lb 9.6 oz).  Physical Exam  GENERAL APPEARANCE: healthy, alert and no distress  EYES: Eyes grossly normal to inspection, PERRL and conjunctivae and sclerae normal  HENT: ear canals and TM's normal, nose and mouth without ulcers or lesions, oropharynx clear and oral mucous membranes moist  NECK: no adenopathy, no asymmetry, masses, or scars and thyroid normal to palpation  RESP: lungs clear to auscultation - no rales, rhonchi or wheezes  BREAST: normal without masses, tenderness or nipple discharge and no palpable axillary masses or adenopathy  CV: regular rate and rhythm, normal S1 S2, no S3 or S4, no murmur, click or rub, no peripheral edema and peripheral pulses strong  ABDOMEN: soft, nontender, no hepatosplenomegaly, no masses and bowel sounds normal   (female): deferred  MS: no musculoskeletal defects are noted and gait is age appropriate without ataxia  SKIN: no suspicious lesions or rashes  NEURO: Normal strength and tone, sensory exam grossly normal, mentation intact and speech normal  PSYCH: mentation appears normal and affect normal/bright         ASSESSMENT / PLAN:   (Z00.00) Routine history and physical examination of adult  (primary encounter diagnosis)  Comment:   Plan: Lipid panel " "reflex to direct LDL Fasting            (Z23) Need for prophylactic vaccination and inoculation against influenza  Comment:   Plan: INFLUENZA (HIGH DOSE) 3 VALENT VACCINE [00436],        Vaccine Administration, Initial [41938]            (E78.5) Hyperlipidemia, unspecified hyperlipidemia type  Comment:   Plan: Lipid panel reflex to direct LDL Fasting,         pravastatin (PRAVACHOL) 40 MG tablet,         **Comprehensive metabolic panel FUTURE 1yr, CK         total            (Z12.31) Breast cancer screening by mammogram  Comment:   Plan: *MA Screening Digital Bilateral            Check labs. refill sent.Cares and  treatment discussed. follow up if problem   Patient expressed understanding and agreement with treatment plan. All patient's questions were answered, will let me know if has more later.  Medications: Rx's: Reviewed the potential side effects/complications of medications prescribed.     COUNSELING:  Reviewed preventive health counseling, as reflected in patient instructions       Regular exercise       Healthy diet/nutrition       Vision screening       Hearing screening       Dental care       Immunizations    Vaccinated for: Influenza             Osteoporosis Prevention/Bone Health       Colon cancer screening    Estimated body mass index is 22.79 kg/m  as calculated from the following:    Height as of 4/24/19: 1.549 m (5' 1\").    Weight as of 4/24/19: 54.7 kg (120 lb 9.6 oz).         reports that she has never smoked. She has never used smokeless tobacco.      Appropriate preventive services were discussed with this patient, including applicable screening as appropriate for cardiovascular disease, diabetes, osteopenia/osteoporosis, and glaucoma.  As appropriate for age/gender, discussed screening for colorectal cancer, prostate cancer, breast cancer, and cervical cancer. Checklist reviewing preventive services available has been given to the patient.    Reviewed patients plan of care and provided an AVS. " The Basic Care Plan (routine screening as documented in Health Maintenance) for Dorie meets the Care Plan requirement. This Care Plan has been established and reviewed with the Patient.    Counseling Resources:  ATP IV Guidelines  Pooled Cohorts Equation Calculator  Breast Cancer Risk Calculator  FRAX Risk Assessment  ICSI Preventive Guidelines  Dietary Guidelines for Americans, 2010  USDA's MyPlate  ASA Prophylaxis  Lung CA Screening    Lydia Durand MD  [facility]    Identified Health Risks:

## 2019-11-12 DIAGNOSIS — E78.5 HYPERLIPIDEMIA, UNSPECIFIED HYPERLIPIDEMIA TYPE: Chronic | ICD-10-CM

## 2019-11-12 DIAGNOSIS — Z00.00 ROUTINE HISTORY AND PHYSICAL EXAMINATION OF ADULT: ICD-10-CM

## 2019-11-12 LAB
ALBUMIN SERPL-MCNC: 4 G/DL (ref 3.4–5)
ALP SERPL-CCNC: 50 U/L (ref 40–150)
ALT SERPL W P-5'-P-CCNC: 25 U/L (ref 0–50)
ANION GAP SERPL CALCULATED.3IONS-SCNC: 6 MMOL/L (ref 3–14)
AST SERPL W P-5'-P-CCNC: 17 U/L (ref 0–45)
BILIRUB SERPL-MCNC: 0.4 MG/DL (ref 0.2–1.3)
BUN SERPL-MCNC: 14 MG/DL (ref 7–30)
CALCIUM SERPL-MCNC: 9 MG/DL (ref 8.5–10.1)
CHLORIDE SERPL-SCNC: 105 MMOL/L (ref 94–109)
CHOLEST SERPL-MCNC: 225 MG/DL
CK SERPL-CCNC: 85 U/L (ref 30–225)
CO2 SERPL-SCNC: 25 MMOL/L (ref 20–32)
CREAT SERPL-MCNC: 0.74 MG/DL (ref 0.52–1.04)
GFR SERPL CREATININE-BSD FRML MDRD: 81 ML/MIN/{1.73_M2}
GLUCOSE SERPL-MCNC: 92 MG/DL (ref 70–99)
HDLC SERPL-MCNC: 68 MG/DL
LDLC SERPL CALC-MCNC: 134 MG/DL
NONHDLC SERPL-MCNC: 157 MG/DL
POTASSIUM SERPL-SCNC: 4 MMOL/L (ref 3.4–5.3)
PROT SERPL-MCNC: 7.2 G/DL (ref 6.8–8.8)
SODIUM SERPL-SCNC: 137 MMOL/L (ref 133–144)
TRIGL SERPL-MCNC: 116 MG/DL

## 2019-11-12 PROCEDURE — 82550 ASSAY OF CK (CPK): CPT | Performed by: FAMILY MEDICINE

## 2019-11-12 PROCEDURE — 36415 COLL VENOUS BLD VENIPUNCTURE: CPT | Performed by: FAMILY MEDICINE

## 2019-11-12 PROCEDURE — 80061 LIPID PANEL: CPT | Performed by: FAMILY MEDICINE

## 2019-11-12 PROCEDURE — 80053 COMPREHEN METABOLIC PANEL: CPT | Performed by: FAMILY MEDICINE

## 2019-11-15 ENCOUNTER — HOSPITAL ENCOUNTER (OUTPATIENT)
Dept: MAMMOGRAPHY | Facility: CLINIC | Age: 69
Discharge: HOME | End: 2019-11-15
Attending: FAMILY MEDICINE | Admitting: FAMILY MEDICINE
Payer: COMMERCIAL

## 2019-11-15 DIAGNOSIS — Z12.31 BREAST CANCER SCREENING BY MAMMOGRAM: ICD-10-CM

## 2019-11-15 PROCEDURE — 77063 BREAST TOMOSYNTHESIS BI: CPT

## 2020-09-11 ENCOUNTER — TRANSFERRED RECORDS (OUTPATIENT)
Dept: HEALTH INFORMATION MANAGEMENT | Facility: CLINIC | Age: 70
End: 2020-09-11

## 2021-01-08 DIAGNOSIS — E78.5 HYPERLIPIDEMIA, UNSPECIFIED HYPERLIPIDEMIA TYPE: Chronic | ICD-10-CM

## 2021-01-08 NOTE — TELEPHONE ENCOUNTER
Routing refill request to provider for review/approval because:  Labs not current:    LDL Cholesterol Calculated   Date Value Ref Range Status   11/12/2019 134 (H) <100 mg/dL Final     Comment:     Above desirable:  100-129 mg/dl  Borderline High:  130-159 mg/dL  High:             160-189 mg/dL  Very high:       >189 mg/dl         Patient needs to be seen because it has been more than 1 year since last office visit.  Routing also to team to assist with scheduling follow up.  Ailin Barrett RN

## 2021-01-11 RX ORDER — PRAVASTATIN SODIUM 40 MG
TABLET ORAL
Qty: 30 TABLET | Refills: 0 | OUTPATIENT
Start: 2021-01-11

## 2021-01-11 NOTE — TELEPHONE ENCOUNTER
Pt called back.  Scheduled appt for 1/14/2021.  She has enough medication to last until then, so please deny refill that is still pended.  Leilani Saab

## 2021-01-13 ASSESSMENT — ACTIVITIES OF DAILY LIVING (ADL): CURRENT_FUNCTION: NO ASSISTANCE NEEDED

## 2021-01-14 ENCOUNTER — OFFICE VISIT (OUTPATIENT)
Dept: FAMILY MEDICINE | Facility: CLINIC | Age: 71
End: 2021-01-14
Payer: COMMERCIAL

## 2021-01-14 VITALS
OXYGEN SATURATION: 98 % | TEMPERATURE: 98.3 F | WEIGHT: 127 LBS | BODY MASS INDEX: 23.98 KG/M2 | SYSTOLIC BLOOD PRESSURE: 134 MMHG | HEART RATE: 85 BPM | HEIGHT: 61 IN | DIASTOLIC BLOOD PRESSURE: 86 MMHG | RESPIRATION RATE: 16 BRPM

## 2021-01-14 DIAGNOSIS — R20.0 NUMBNESS AND TINGLING IN RIGHT HAND: ICD-10-CM

## 2021-01-14 DIAGNOSIS — M79.641 PAIN OF RIGHT HAND: ICD-10-CM

## 2021-01-14 DIAGNOSIS — E78.5 HYPERLIPIDEMIA LDL GOAL <130: ICD-10-CM

## 2021-01-14 DIAGNOSIS — Z12.31 BREAST CANCER SCREENING BY MAMMOGRAM: ICD-10-CM

## 2021-01-14 DIAGNOSIS — K21.9 GASTROESOPHAGEAL REFLUX DISEASE, UNSPECIFIED WHETHER ESOPHAGITIS PRESENT: ICD-10-CM

## 2021-01-14 DIAGNOSIS — R05.3 CHRONIC COUGH: ICD-10-CM

## 2021-01-14 DIAGNOSIS — Z83.719 FAMILY HISTORY OF POLYPS IN THE COLON: Chronic | ICD-10-CM

## 2021-01-14 DIAGNOSIS — R20.2 NUMBNESS AND TINGLING IN RIGHT HAND: ICD-10-CM

## 2021-01-14 DIAGNOSIS — Z00.00 ROUTINE HISTORY AND PHYSICAL EXAMINATION OF ADULT: Primary | ICD-10-CM

## 2021-01-14 DIAGNOSIS — Z82.49 FAMILY HISTORY OF ISCHEMIC HEART DISEASE: Chronic | ICD-10-CM

## 2021-01-14 DIAGNOSIS — E78.5 HYPERLIPIDEMIA, UNSPECIFIED HYPERLIPIDEMIA TYPE: Chronic | ICD-10-CM

## 2021-01-14 DIAGNOSIS — J30.89 SEASONAL ALLERGIC RHINITIS DUE TO OTHER ALLERGIC TRIGGER: ICD-10-CM

## 2021-01-14 DIAGNOSIS — A04.8 HELICOBACTER PYLORI INFECTION: ICD-10-CM

## 2021-01-14 LAB
ALBUMIN SERPL-MCNC: 3.9 G/DL (ref 3.4–5)
ALP SERPL-CCNC: 53 U/L (ref 40–150)
ALT SERPL W P-5'-P-CCNC: 28 U/L (ref 0–50)
ANION GAP SERPL CALCULATED.3IONS-SCNC: 5 MMOL/L (ref 3–14)
AST SERPL W P-5'-P-CCNC: 16 U/L (ref 0–45)
BILIRUB SERPL-MCNC: 0.4 MG/DL (ref 0.2–1.3)
BUN SERPL-MCNC: 16 MG/DL (ref 7–30)
CALCIUM SERPL-MCNC: 9.3 MG/DL (ref 8.5–10.1)
CHLORIDE SERPL-SCNC: 106 MMOL/L (ref 94–109)
CHOLEST SERPL-MCNC: 246 MG/DL
CO2 SERPL-SCNC: 27 MMOL/L (ref 20–32)
CREAT SERPL-MCNC: 0.75 MG/DL (ref 0.52–1.04)
GFR SERPL CREATININE-BSD FRML MDRD: 79 ML/MIN/{1.73_M2}
GLUCOSE SERPL-MCNC: 99 MG/DL (ref 70–99)
HDLC SERPL-MCNC: 72 MG/DL
LDLC SERPL CALC-MCNC: 155 MG/DL
NONHDLC SERPL-MCNC: 174 MG/DL
POTASSIUM SERPL-SCNC: 3.9 MMOL/L (ref 3.4–5.3)
PROT SERPL-MCNC: 7.6 G/DL (ref 6.8–8.8)
SODIUM SERPL-SCNC: 138 MMOL/L (ref 133–144)
TRIGL SERPL-MCNC: 97 MG/DL

## 2021-01-14 PROCEDURE — 99213 OFFICE O/P EST LOW 20 MIN: CPT | Mod: 25 | Performed by: FAMILY MEDICINE

## 2021-01-14 PROCEDURE — 80061 LIPID PANEL: CPT | Performed by: FAMILY MEDICINE

## 2021-01-14 PROCEDURE — 99397 PER PM REEVAL EST PAT 65+ YR: CPT | Performed by: FAMILY MEDICINE

## 2021-01-14 PROCEDURE — 80053 COMPREHEN METABOLIC PANEL: CPT | Performed by: FAMILY MEDICINE

## 2021-01-14 PROCEDURE — 36415 COLL VENOUS BLD VENIPUNCTURE: CPT | Performed by: FAMILY MEDICINE

## 2021-01-14 RX ORDER — NABUMETONE 500 MG/1
500 TABLET, FILM COATED ORAL 2 TIMES DAILY PRN
Qty: 30 TABLET | Refills: 0 | Status: SHIPPED | OUTPATIENT
Start: 2021-01-14 | End: 2021-03-06

## 2021-01-14 RX ORDER — OMEPRAZOLE 40 MG/1
40 CAPSULE, DELAYED RELEASE ORAL DAILY
Qty: 30 CAPSULE | Refills: 1 | Status: SHIPPED | OUTPATIENT
Start: 2021-01-14 | End: 2021-02-05

## 2021-01-14 RX ORDER — PRAVASTATIN SODIUM 40 MG
TABLET ORAL
Qty: 90 TABLET | Refills: 3 | Status: SHIPPED | OUTPATIENT
Start: 2021-01-14 | End: 2021-03-06 | Stop reason: ALTCHOICE

## 2021-01-14 ASSESSMENT — ACTIVITIES OF DAILY LIVING (ADL): CURRENT_FUNCTION: NO ASSISTANCE NEEDED

## 2021-01-14 ASSESSMENT — MIFFLIN-ST. JEOR: SCORE: 1019.48

## 2021-01-14 NOTE — PATIENT INSTRUCTIONS
Patient Education     Prevention Guidelines, Women Ages 65 and Older  Screening tests and vaccines are an important part of managing your health. A screening test is done to find possible disorders or diseases in people who don't have any symptoms. The goal is to find a disease early so lifestyle changes can be made and you can be watched more closely to reduce the risk of disease, or to detect it early enough to treat it most effectively. Screening tests are not considered diagnostic, but are used to determine if more testing is needed. Health counseling is essential, too. Below are guidelines for these, for women ages 65 and older. Talk with your healthcare provider to make sure you re up to date on what you need.  Screening Who needs it How often   Type 2 diabetes or prediabetes All women ages 40 to 75 who are overweight or obese At least every 3 years   Type 2 diabetes All women with prediabetes Every year   Alcohol misuse All women in this age group At routine exams   Blood pressure All women in this age group Yearly checkup if your blood pressure is normal*  Normal blood pressure is less than 120/80 mm Hg*  If your blood pressure reading is higher than normal, follow the advice of your healthcare provider   Breast cancer All women of average risk  There are no guidelines for breast cancer screening for 75 years and older.  Mammograms should be done every 1 or 2 years until age 75. At that point a woman should talk to her doctor about whether to continue screening. Talk to your doctor regarding your recommended frequency depending on your risk factors.   Cervical cancer Only women who had abnormal screening results before age 65 Talk with your healthcare provider   Chlamydia Women at increased risk for infection At routine exams   Colorectal cancer All women over the age of 50  This screening is advised against for women over 75 or if there is a life expectancy of less than 10 years.  Multiple tests are  available and are used at different times. Possible tests include: flexible sigmoidoscopy, colonoscopy, double-contrast barium enema, yearly fecal occult blood test, fecal immunochemical test, or stool DNA test as often as your healthcare provider advises. Talk with your healthcare provider about which tests are best for you.   Depression All women in this age group At routine exams   Gonorrhea Sexually active women at increased risk for infection At routine exams   Hepatitis C Anyone at increased risk; 1 time for those born between 1945 and 1965 At routine exams   High cholesterol or triglycerides All women in this age group who are at risk for coronary artery disease At least every 5 years   HIV Women at increased risk for infection-talk with your healthcare provider At routine exams   Lung cancer Adults ages 55 to 74 who have smoked with a 30-pack-a-year history and have smoked within the past 15 years  Annual low dose CT scan   Obesity All women in this age group At routine exams   Osteoporosis All women in this age group Bone density test at age 65, then follow-up as advised by your healthcare provider   Syphilis Women at increased risk for infection-talk with your healthcare provider At routine exams   Thyroid-Stimulating Hormone (TSH) All women in this age group with symptoms of thyroid dysfunction. There is not enough evidence to support TSH screening in women without symptoms.  ACOG recommendation is every 5 years; American Academy of Family Physicians concludes there is not enough evidence to support routine screening in adults without symptoms.    Tuberculosis Women at increased risk for infection-talk with your healthcare provider Ask your healthcare provider   Vision All women in this age group Every 1 to 2 years; if you have a chronic health condition, ask your healthcare provider if you need exams more often   Vaccine Who needs it How often   Chickenpox (varicella) All women in this age group who have  no record of this infection or vaccine 2 doses; second dose should be given at least 4 weeks after the first dose   Hepatitis A Women at increased risk for infection-talk with your healthcare provider 2 doses given 6 months apart   Hepatitis B Women at increased risk for infection-talk with your healthcare provider 3 doses over 6 months; second dose should be given 1 month after the first dose; the third dose should be given at least 2 months after the second dose and at least 4 months after the first dose   Haemophilus influenza Type B (HIB) Women at increased risk for infection-talk with your healthcare provider 1 to 3 doses   Influenza (flu) All women in this age group Once a year   Pneumococcal conjugate vaccine (PCV13) and pneumococcal polysaccharide vaccine (PPSV23) All women in this age group 1 dose of each vaccine   Tetanus/diphtheria/pertussis (Td/Tdap) booster All women in this age group Td every 10 years, or a one-time dose of Tdap instead of a Td booster after age 18, then Td every 10 years   Zoster All women in this age group 1 dose   Counseling Who needs it How often   Diet and exercise Women who are overweight or obese When diagnosed, and then at routine exams   Fall prevention (exercise and vitamin D supplements) All women in this age group At routine exams   Sexually transmitted infection prevention Women at increased risk for infection-talk with your healthcare provider At routine exams   Use of daily aspirin Talk to your healthcare provider about whether or not to start taking low-dose aspirin for the prevention of cardiovascular disease (CVD) and colorectal cancer in adults ages 60 to 69 who have at least a 10% risk of getting CVD within the next 10 years.  People who are not at increased risk for bleeding, have a life expectancy of at least 10 years, and are willing to take low-dose aspirin daily for at least 10 years are more likely to benefit. When the advantages of taking low-dose aspirin  outweigh the risks, people may choose to start taking a low-dose aspirin.  There is not enough data to support the use of aspirin in people over the age of 70.  When your risk is known   Use of tobacco and the health effects it can cause All women in this age group Every exam   [employer] last reviewed this educational content on 11/1/2017 2000-2020 [employer]. [address] [place] [address], [place] [address]. All rights reserved. This information is not intended as a substitute for professional medical care. Always follow your healthcare professional's instructions.           Patient Education     Tendonitis  A tendon is the thick fibrous cord that joins muscle to bone and allows joints to move. When a tendon becomes inflamed, it is called tendonitis. This can occur from overuse, injury, or infection. This usually involves the shoulders, forearm, wrist, hands and feet. Symptoms include pain, swelling and tenderness to the touch. Moving the joint increases the pain.  It takes 4 to 6 weeks or more for tendonitis to heal. It is treated by preventing motion of the tendon, occasionally with a splint or brace, and the use of anti-inflammatory medicine.  Home care    Some people find relief with ice packs. These can be crushed or cubed ice in a plastic bag or a bag of frozen vegetables wrapped in a thin towel. Other people get better relief with heat. This can include a hot shower, hot bath, or a moist towel warmed in a microwave. Try each and use the method that feels best, for 15 to 20 minutes several times a day.    Rest the inflamed joint and protect it from movement.    You may use over-the-counter ibuprofen or naproxen to treat pain and inflammation, unless another medicine was prescribed. If you can't take these medicines, acetaminophen may help with the pain, but does not treat inflammation. If you have chronic liver or kidney disease or ever had a stomach ulcer or gastrointestinal bleeding, talk with  your doctor before using these medicines.    As your symptoms improve, begin gradual motion at the involved joint.  Follow-up care  Follow up with your healthcare provider if you are not improving after 5 to 7 days of treatment.  When to seek medical advice  Call your healthcare provider right away if any of these occur:    Redness over the painful area    Increasing pain or swelling at the joint    Fever lasting 24 to 48 hours or chills, or as advised by your healthcare provider  [employer] last reviewed this educational content on 5/1/2018 2000-2020 [employer]. [address] [place] [id number]. All rights reserved. This information is not intended as a substitute for professional medical care. Always follow your healthcare professional's instructions.

## 2021-01-14 NOTE — PROGRESS NOTES
"SUBJECTIVE:   Dorie Gagnon is a 70 year old female who presents for Preventive Visit.      Patient has been advised of split billing requirements and indicates understanding: Yes   Are you in the first 12 months of your Medicare coverage?  No    Healthy Habits:     In general, how would you rate your overall health?  Good    Frequency of exercise:  2-3 days/week    Duration of exercise:  30-45 minutes    Do you usually eat at least 4 servings of fruit and vegetables a day, include whole grains    & fiber and avoid regularly eating high fat or \"junk\" foods?  Yes    Taking medications regularly:  Yes    Ability to successfully perform activities of daily living:  No assistance needed    Home Safety:  No safety concerns identified    Hearing Impairment:  Difficulty understanding soft or whispered speech    In the past 6 months, have you been bothered by leaking of urine?  No    In general, how would you rate your overall mental or emotional health?  Good      PHQ-2 Total Score: 1    Additional concerns today:  Yes    Do you feel safe in your environment? Yes    Have you ever done Advance Care Planning? (For example, a Health Directive, POLST, or a discussion with a medical provider or your loved ones about your wishes): No, advance care planning information given to patient to review.  Patient declined advance care planning discussion at this time.      Fall risk  Fallen 2 or more times in the past year?: No  Any fall with injury in the past year?: No    Cognitive Screening   1) Repeat 3 items (Leader, Season, Table)    2) Clock draw: NORMAL  3) 3 item recall: Recalls 3 objects  Results: 3 items recalled: COGNITIVE IMPAIRMENT LESS LIKELY    Mini-CogTM Copyright JAVI Chacon. Licensed by the author for use in [facility]; reprinted with permission (fely@.[facility]). All rights reserved.      Do you have sleep apnea, excessive snoring or daytime drowsiness?: no    Reviewed and updated as needed this visit by " clinical staff                 Reviewed and updated as needed this visit by Provider                Social History     Tobacco Use     Smoking status: Never Smoker     Smokeless tobacco: Never Used   Substance Use Topics     Alcohol use: Yes     Comment: 1 wine per day     If you drink alcohol do you typically have >3 drinks per day or >7 drinks per week? No    Alcohol Use 1/13/2021   Prescreen: >3 drinks/day or >7 drinks/week? No   Prescreen: >3 drinks/day or >7 drinks/week? -           PROBLEMS TO ADD ON...    Gi sx / chronic cough   Duration of complaint:   Description of symptoms:  Always has  on and off gi sx of indigestion burps a lot / gas etc , feels like more noticeable lately   food getting stuck: no , although she always has concerns with not able to swallow big pills etc and it may be more noticeable .   Always feels like has mild cough  to clear up her throat all the time,  although no uri sx . Has possible mild allergies and has taken OTC allergy pill often and usually not too bad , although not sure if she has noticed any difference in her sx  from taking or not taking the pill   nausea/vomiting: no   abdominal pain: no,  just more gassy bloated feeling   black/tarry or bloody stools: no :  No change in bm, regular   worse with   - beans gives her gas.  she likes  Eating spicy foods and has not paid much attention to that . caffeinated drinks- not much 1-2 cups / days  and alcohol -  a glass of wine every night and may be a  bit more on weekend .  current NSAID/Aspirin use: no   Therapies tried and outcome: Pepcid (famotidine) and antacids on and off with some relief . Her  brothers have  possible acid reflux,     HAND PAIN/ NUMBNESS       Has recurrent hand pain , mostly rt hand thumb and index finger.  no redness , swelling etc. No recall of any acute injury but has been using qing, playing game on screen etc and sx can be more noticeable when she is doing that.    it comes an goes.  also has  noted some numbness and tingling on and off in her hand more so thumb and index finger area   It can happen  in left had sometimes too,  but rt is worse.   Has hx of some arthritis in joints  so has other joints feel achy as well sometimes, but hand is bothering her more lately.  not taking ay pain  med's .     Current providers sharing in care for this patient include:   Patient Care Team:  Lydia Durand MD as PCP - General (Family Practice)  Lydia Durand MD as Assigned PCP    The following health maintenance items are reviewed in Epic and correct as of today:  Health Maintenance   Topic Date Due     COLORECTAL CANCER SCREENING  10/05/2020     MEDICARE ANNUAL WELLNESS VISIT  11/07/2020     FALL RISK ASSESSMENT  11/07/2020     MAMMO SCREENING  11/15/2021     LIPID  11/12/2024     ADVANCE CARE PLANNING  11/13/2024     DTAP/TDAP/TD IMMUNIZATION (4 - Td) 01/18/2028     DEXA  11/19/2033     HEPATITIS C SCREENING  Completed     PHQ-2  Completed     INFLUENZA VACCINE  Completed     Pneumococcal Vaccine: 65+ Years  Completed     ZOSTER IMMUNIZATION  Completed     Pneumococcal Vaccine: Pediatrics (0 to 5 Years) and At-Risk Patients (6 to 64 Years)  Aged Out     IPV IMMUNIZATION  Aged Out     MENINGITIS IMMUNIZATION  Aged Out     HEPATITIS B IMMUNIZATION  Aged Out     Patient Active Problem List   Diagnosis     Family history of ischemic heart disease     Osteopenia     Hidradenitis     Hyperlipidemia LDL goal <130     Advanced directives, counseling/discussion     Family history of polyps in the colon     Palpitations     Acid indigestion     PVC's (premature ventricular contractions)     Hallux valgus of left foot     Chronic cough     Gastroesophageal reflux disease, unspecified whether esophagitis present     Past Surgical History:   Procedure Laterality Date     BUNIONECTOMY Left 4/24/2019    Procedure: LEFT HALLIX VAGUS AND SECOND CLAWTOE RECONSTRUCTION;  Surgeon: Tyler Jameson MD;  Location:   OR     BUNIONECTOMY KATIE  2012    Procedure:BUNIONECTOMY KATIE; RIGHT FOOT EULA KATIE BUNIONECTOMY, HAMMER TOE RECONSTRUCTION DIGITS TWO, THREE, FOUR AND FIVE; Surgeon:DEANDRE MONTE; Location:[facility]     C DEXA INTERPRETATION, AXIAL  2003    T score femur -1.3, lumbar -1.0     C DEXA INTERPRETATION, AXIAL  2004    T score lumbar -1.2, femoral -1.4     C DEXA INTERPRETATION, AXIAL  2007    T score lumbar -1.5 (decline -2.8%/yr), femoral neck -1.3/-1.8 (decline -1.8%/yr)     C DEXA INTERPRETATION, AXIAL  2014    T score L1-4 of -1.4 (dec 2.7%), femoral neck -1.9/-2.2 with BMD 0.728 (dec 0.8%)     COLONOSCOPY       COLONOSCOPY  2000    normal, repeat 10 years     COLONOSCOPY  10/2010    normal, repeat 5 years     DEXA  10/2010    T score lumbar -1.2(stable), femoral neck -1.8/-2.2 with BMD 0.755 (6% worsening)     HC TOOTH EXTRACTION W/FORCEP      wisdom teeth extracted     REPAIR HAMMER TOE  2012    Procedure:REPAIR HAMMER TOE; Surgeon:DEANDRE MONTE; Location:[facility]     STRESS ECHO  10/2010    normal LV function at rest, hyperdynamic with exercise, EF 55-60% at rest and 70% post exercise     STRESS ECHO  10/2010    negative for echo/EKG changes of ischemia, normal LV fct with EF 55-60%, systolic fct hyperdynamic with exercise, exercised 7 min, EK.5 mm upsloping ST depression inf lead     STRESS ECHO (METRO)  2004    negative, exercise 8-9 min       Social History     Tobacco Use     Smoking status: Never Smoker     Smokeless tobacco: Never Used   Substance Use Topics     Alcohol use: Yes     Comment: 1 wine per day     Family History   Problem Relation Age of Onset     C.A.D. Father         MI age 61,   age 76 from MI     Diabetes Father         type 2     Cancer Father         esophagus, former smoker     Obesity Father      C.A.D. Mother         MI age 62     Lipids Mother      Blood Disease Mother         pernicious anemia     Thyroid Disease Mother          hypothyroidism     Eye Disorder Mother         macular degeneration     Osteoporosis Mother         foot fracture     Connective Tissue Disorder Mother         SLE - remission     Hypertension Mother      Hyperlipidemia Mother      Asthma Mother      Dementia Mother         memory loss, onset early 90s     C.A.D. Maternal Grandmother          early 80s     C.A.D. Maternal Grandfather          at younger age     C.A.D. Paternal Grandmother          late 70s     Hypertension Brother      Colon Polyps Brother         sister may also have colon polyps     Coronary Artery Disease Brother         angioplasty     Asthma Brother      Obesity Brother      Arrhythmia Brother      Hyperlipidemia Sister      Osteoporosis Sister      Colon Polyps Sister         possible         Pneumonia Vaccine:Adults age 65+ who received Pneumovax (PSVT) at 65 years or older: Should be given PCV13 > 1 year after their most recent PPSV23  Mammogram Screening: Mammogram Screening: Patient over age 50, mutual decision to screen reflected in health maintenance.    Review of Systems  CONSTITUTIONAL: NEGATIVE for fever, chills, change in weight  INTEGUMENTARY/SKIN: NEGATIVE for worrisome rashes, moles or lesions  EYES: NEGATIVE for vision changes or irritation  ENT/MOUTH: NEGATIVE for ear, mouth and throat problems  RESP: NEGATIVE for significant cough or SOB  BREAST: NEGATIVE for masses, tenderness or discharge  CV: NEGATIVE for chest pain, palpitations or peripheral edema  GI: NEGATIVE for nausea, abdominal pain, heartburn, or change in bowel habits  GI: as per HPI   : NEGATIVE for frequency, dysuria, or hematuria  MUSCULOSKELETAL: NEGATIVE for significant arthralgias or myalgia  MUSCULOSKELETAL:NEGATIVE for significant arthralgias or myalgia and except as per HPI   NEURO: NEGATIVE for weakness, dizziness or paresthesias  ENDOCRINE: NEGATIVE for temperature intolerance, skin/hair changes  HEME: NEGATIVE for bleeding  "problems  PSYCHIATRIC: NEGATIVE for changes in mood or affect    OBJECTIVE:   There were no vitals taken for this visit. Estimated body mass index is 24 kg/m  as calculated from the following:    Height as of 11/7/19: 1.549 m (5' 1\").    Weight as of 11/7/19: 57.6 kg (127 lb).  Physical Exam  GENERAL: healthy, alert and no distress  EYES: Eyes grossly normal to inspection, PERRL and conjunctivae and sclerae normal  HENT: ear canals and TM's normal, nose and mouth without ulcers or lesions  NECK: no adenopathy, no asymmetry, masses, or scars and thyroid normal to palpation  RESP: lungs clear to auscultation - no rales, rhonchi or wheezes  BREAST: normal without masses, tenderness or nipple discharge and no palpable axillary masses or adenopathy  CV: regular rate and rhythm, normal S1 S2, no S3 or S4, no murmur, click or rub, no peripheral edema   ABDOMEN: soft, nontender, no hepatosplenomegaly, no masses and bowel sounds normal   (female): deferred  RECTAL: deferred  MS: no leg edema. WRIST/ HANDS -with no obvious swelling or erythema. Wrist and finger al have good ROM both hand . Rt hand has tenderness across base of thumb and ROM of thumb and index finger aggravates discomfort . Phalen negative at this time .    SKIN: no suspicious lesions or rashes  NEURO: Normal strength and tone, mentation intact and speech normal  PSYCH: mentation appears normal, affect normal        ASSESSMENT / PLAN:   (Z00.00) Routine history and physical examination of adult  (primary encounter diagnosis)  Comment:   Plan:     (Z82.49) Family history of ischemic heart disease  Comment:   Plan:     (E78.5) Hyperlipidemia LDL goal <130  Comment:   Plan: Lipid panel reflex to direct LDL Fasting,         Comprehensive metabolic panel            (Z83.71) Family history of polyps in the colon  Comment:   Plan: GASTROENTEROLOGY ADULT REF PROCEDURE ONLY            (Z12.31) Breast cancer screening by mammogram  Comment:   Plan: *MA Screening " Digital Bilateral        (E78.5) Hyperlipidemia, unspecified hyperlipidemia type  Comment:   Plan: pravastatin (PRAVACHOL) 40 MG tablet           (R20.0,  R20.2) Numbness and tingling in right hand  Comment: mostly thumb and index finger , likely tendonitis related/ possible carpel tunnel   Plan: nabumetone (RELAFEN) 500 MG tablet,         Miscellaneous Order for DME - ONLY FOR DME            (M79.641) Pain of right hand  Comment: mostly thumb and index finger   Plan: nabumetone (RELAFEN) 500 MG tablet,         Miscellaneous Order for DME - ONLY FOR DME         Sounds like tendonitis, also possible early carpel tunnel ?      discussed  cares and symptomatic treatment including  adequate pain control, splint to allow rest,  stretches etc.          she will do follow up if I 4-6 week if no improvement, sooner if  problem. Consider further evaluation  if needed.         (R05) Chronic cough  Comment: mostly to clear her throat , possible GERD  allergy related   Plan:wiling to try Prilosec and also OTC a;allergy med's to see if helps.   Cares and symptomatic treatment discussed follow up if problem       (K21.9) Gastroesophageal reflux disease, unspecified whether esophagitis present  Comment: possible GERD  And allergy related   Plan: Helicobacter pylori Antigen Stool, omeprazole         (PRILOSEC) 40 MG DR capsule            Discussed possible differential diagnosis for her symptoms. This could also be contributing to her chronic cough. Sounds like GERD, also talked about H pylori and other causes of recurrent heart burn . Check labs.   also she is willing to try Prilosec 40 mg to see if helps with sx talked about reflux precautions etc .     Suggest follow up in 4-6 week, sooner if problem. Consider GI referral and further evaluation if needed.       Check labs. refill sent. Cares and  treatment discussed. follow up if problem   Patient expressed understanding and agreement with treatment plan. All patient's questions  "were answered, will let me know if has more later.  Medications: Rx's: Reviewed the potential side effects/complications of medications prescribed.     COUNSELING:  Reviewed preventive health counseling, as reflected in patient instructions       Regular exercise       Healthy diet/nutrition       Vision screening       Dental care        Alcohol use        Fall risk prevention       Immunizations    Vaccinated UTD            Osteoporosis Prevention/Bone Health       Colon cancer screening    Estimated body mass index is 24 kg/m  as calculated from the following:    Height as of 11/7/19: 1.549 m (5' 1\").    Weight as of 11/7/19: 57.6 kg (127 lb).        She reports that she has never smoked. She has never used smokeless tobacco.      Appropriate preventive services were discussed with this patient, including applicable screening as appropriate for cardiovascular disease, diabetes, osteopenia/osteoporosis, and glaucoma.  As appropriate for age/gender, discussed screening for colorectal cancer, prostate cancer, breast cancer, and cervical cancer. Checklist reviewing preventive services available has been given to the patient.    Reviewed patients plan of care and provided an AVS. The Basic Care Plan (routine screening as documented in Health Maintenance) for Dorie meets the Care Plan requirement. This Care Plan has been established and reviewed with the Patient.    Counseling Resources:  ATP IV Guidelines  Pooled Cohorts Equation Calculator  Breast Cancer Risk Calculator  Breast Cancer: Medication to Reduce Risk  FRAX Risk Assessment  ICSI Preventive Guidelines  Dietary Guidelines for Americans, 2010  USDA's MyPlate  ASA Prophylaxis  Lung CA Screening    Lydia Durand MD  [facility]    Identified Health Risks:  "

## 2021-01-15 DIAGNOSIS — K21.9 GASTROESOPHAGEAL REFLUX DISEASE, UNSPECIFIED WHETHER ESOPHAGITIS PRESENT: ICD-10-CM

## 2021-01-15 PROCEDURE — 87338 HPYLORI STOOL AG IA: CPT | Performed by: FAMILY MEDICINE

## 2021-01-16 PROBLEM — R20.2 NUMBNESS AND TINGLING IN RIGHT HAND: Status: ACTIVE | Noted: 2021-01-16

## 2021-01-16 PROBLEM — J30.89 SEASONAL ALLERGIC RHINITIS DUE TO OTHER ALLERGIC TRIGGER: Status: ACTIVE | Noted: 2021-01-16

## 2021-01-16 PROBLEM — R20.0 NUMBNESS AND TINGLING IN RIGHT HAND: Status: ACTIVE | Noted: 2021-01-16

## 2021-01-16 PROBLEM — M79.641 PAIN OF RIGHT HAND: Status: ACTIVE | Noted: 2021-01-16

## 2021-01-16 RX ORDER — LORATADINE 10 MG/1
10 TABLET ORAL DAILY PRN
Qty: 30 TABLET | Refills: 0 | Status: SHIPPED | OUTPATIENT
Start: 2021-01-16 | End: 2021-09-09 | Stop reason: ALTCHOICE

## 2021-01-18 LAB — H PYLORI AG STL QL IA: POSITIVE

## 2021-01-18 RX ORDER — CLARITHROMYCIN 500 MG
500 TABLET ORAL 2 TIMES DAILY
Qty: 28 TABLET | Refills: 0 | Status: SHIPPED | OUTPATIENT
Start: 2021-01-18 | End: 2021-02-01

## 2021-01-18 RX ORDER — AMOXICILLIN 500 MG/1
1000 CAPSULE ORAL 2 TIMES DAILY
Qty: 56 CAPSULE | Refills: 0 | Status: SHIPPED | OUTPATIENT
Start: 2021-01-18 | End: 2021-02-01

## 2021-01-25 ENCOUNTER — HOSPITAL ENCOUNTER (OUTPATIENT)
Dept: MAMMOGRAPHY | Facility: CLINIC | Age: 71
Discharge: HOME | End: 2021-01-25
Attending: FAMILY MEDICINE | Admitting: FAMILY MEDICINE
Payer: COMMERCIAL

## 2021-01-25 DIAGNOSIS — Z12.31 BREAST CANCER SCREENING BY MAMMOGRAM: ICD-10-CM

## 2021-01-25 PROCEDURE — 77063 BREAST TOMOSYNTHESIS BI: CPT

## 2021-02-05 DIAGNOSIS — K21.9 GASTROESOPHAGEAL REFLUX DISEASE, UNSPECIFIED WHETHER ESOPHAGITIS PRESENT: ICD-10-CM

## 2021-02-05 RX ORDER — OMEPRAZOLE 40 MG/1
CAPSULE, DELAYED RELEASE ORAL
Qty: 30 CAPSULE | Refills: 1 | Status: SHIPPED | OUTPATIENT
Start: 2021-02-05 | End: 2021-02-24

## 2021-02-07 DIAGNOSIS — Z11.59 ENCOUNTER FOR SCREENING FOR OTHER VIRAL DISEASES: ICD-10-CM

## 2021-02-07 LAB
SARS-COV-2 RNA RESP QL NAA+PROBE: NORMAL
SPECIMEN SOURCE: NORMAL

## 2021-02-07 PROCEDURE — U0003 INFECTIOUS AGENT DETECTION BY NUCLEIC ACID (DNA OR RNA); SEVERE ACUTE RESPIRATORY SYNDROME CORONAVIRUS 2 (SARS-COV-2) (CORONAVIRUS DISEASE [COVID-19]), AMPLIFIED PROBE TECHNIQUE, MAKING USE OF HIGH THROUGHPUT TECHNOLOGIES AS DESCRIBED BY CMS-2020-01-R: HCPCS | Performed by: COLON & RECTAL SURGERY

## 2021-02-07 PROCEDURE — U0005 INFEC AGEN DETEC AMPLI PROBE: HCPCS | Performed by: COLON & RECTAL SURGERY

## 2021-02-08 LAB
LABORATORY COMMENT REPORT: NORMAL
SARS-COV-2 RNA RESP QL NAA+PROBE: NEGATIVE
SPECIMEN SOURCE: NORMAL

## 2021-02-10 ENCOUNTER — HOSPITAL ENCOUNTER (OUTPATIENT)
Facility: CLINIC | Age: 71
Discharge: HOME | End: 2021-02-10
Attending: COLON & RECTAL SURGERY | Admitting: COLON & RECTAL SURGERY
Payer: COMMERCIAL

## 2021-02-10 VITALS
TEMPERATURE: 99 F | OXYGEN SATURATION: 100 % | SYSTOLIC BLOOD PRESSURE: 120 MMHG | DIASTOLIC BLOOD PRESSURE: 70 MMHG | BODY MASS INDEX: 24 KG/M2 | HEART RATE: 56 BPM | RESPIRATION RATE: 16 BRPM | HEIGHT: 61 IN

## 2021-02-10 LAB — COLONOSCOPY: NORMAL

## 2021-02-10 PROCEDURE — 99153 MOD SED SAME PHYS/QHP EA: CPT | Performed by: COLON & RECTAL SURGERY

## 2021-02-10 PROCEDURE — 45385 COLONOSCOPY W/LESION REMOVAL: CPT | Mod: PT | Performed by: COLON & RECTAL SURGERY

## 2021-02-10 PROCEDURE — 250N000011 HC RX IP 250 OP 636: Performed by: COLON & RECTAL SURGERY

## 2021-02-10 PROCEDURE — 88305 TISSUE EXAM BY PATHOLOGIST: CPT | Mod: TC | Performed by: COLON & RECTAL SURGERY

## 2021-02-10 PROCEDURE — G0500 MOD SEDAT ENDO SERVICE >5YRS: HCPCS | Performed by: COLON & RECTAL SURGERY

## 2021-02-10 PROCEDURE — 88305 TISSUE EXAM BY PATHOLOGIST: CPT | Mod: 26 | Performed by: PATHOLOGY

## 2021-02-10 RX ORDER — ONDANSETRON 2 MG/ML
4 INJECTION INTRAMUSCULAR; INTRAVENOUS
Status: DISCONTINUED | OUTPATIENT
Start: 2021-02-10 | End: 2021-02-10 | Stop reason: HOSPADM

## 2021-02-10 RX ORDER — FENTANYL CITRATE 50 UG/ML
INJECTION, SOLUTION INTRAMUSCULAR; INTRAVENOUS PRN
Status: DISCONTINUED | OUTPATIENT
Start: 2021-02-10 | End: 2021-02-10 | Stop reason: HOSPADM

## 2021-02-10 RX ORDER — LIDOCAINE 40 MG/G
CREAM TOPICAL
Status: DISCONTINUED | OUTPATIENT
Start: 2021-02-10 | End: 2021-02-10 | Stop reason: HOSPADM

## 2021-02-10 NOTE — H&P
Colon & Rectal Surgery History and Physical  Pre-Endoscopy Procedure Note    History of Present Illness   I have been asked by Dr. Durand to evaluate this 71 year old female for colorectal cancer screening. She had a normal screening colonoscopy in 2010 and currently denies any abdominal pain, weight loss, bleeding per rectum, or recent change in bowel habits.    Past Medical History  Diagnosis Date     Hidradenitis     recurrent, groin     Influenza with other manifestations 1972    hospitalized     Menopause     age 43     Mixed hyperlipidemia      Osteopenia 2003 FRAX: 12% risk major osteoporotic fx, 2.2% risk hip fx       Past Surgical History  Procedure Laterality Date     BUNIONECTOMY Left 2019    Procedure: LEFT HALLIX VAGUS AND SECOND CLAWTOE RECONSTRUCTION;  Surgeon: Tyler Jameson MD;  Location:  OR     BUNIONECTOMY  2012    Procedure:BUNIONECTOMY KATIE; RIGHT FOOT EULA KATIE BUNIONECTOMY, HAMMER TOE RECONSTRUCTION DIGITS TWO, THREE, FOUR AND FIVE; Surgeon:DEANDRE MONTE; Location:[facility]     TOOTH EXTRACTION W/FORCEP      wisdom teeth extracted     REPAIR HAMMER TOE  2012    Procedure:REPAIR HAMMER TOE; Surgeon:DEANDRE MONTE; Location:[facility]     STRESS ECHO  10/2010    normal LV function at rest, hyperdynamic with exercise, EF 55-60% at rest and 70% post exercise     STRESS ECHO  10/2010    negative for echo/EKG changes of ischemia, normal LV fct with EF 55-60%, systolic fct hyperdynamic with exercise, exercised 7 min, EK.5 mm upsloping ST depression inf lead     STRESS ECHO (METRO)  2004    negative, exercise 8-9 min        Medications  Medication Sig     atorvastatin (LIPITOR) 20 MG tablet Take 1 tablet (20 mg) by mouth daily     Biotin 5000 MCG TABS Take 1 tablet by mouth daily     Calcium-Vitamin D-Vitamin K 500-1000-40 MG-UNT-MCG CHEW Take 2 chew tab by mouth 2 times daily (Gummie)     carboxymethylcellulose PF (REFRESH PLUS) 0.5 % SOLN  ophthalmic solution Place 1-2 drops into both eyes At Bedtime     Coenzyme Q10-Vitamin E (QUNOL ULTRA COQ10 PO) Take 2 teaspoonful by mouth daily     Glucosamine-Chondroitin-Vit C 9924-6422-65 MG/30ML LIQD Take 2 Tablespoonful by mouth daily     loratadine (CLARITIN) 10 MG tablet Take 1 tablet (10 mg) by mouth daily as needed for allergies     melatonin 1 MG TABS Take 2 mg by mouth At Bedtime      Multiple Vitamins-Minerals (MULTIVITAMIN & MINERAL) LIQD Take 2 Tablespoonful by mouth daily     nabumetone (RELAFEN) 500 MG tablet Take 1 tablet (500 mg) by mouth 2 times daily as needed for moderate pain     omeprazole (PRILOSEC) 40 MG DR capsule TAKE 1 CAPSULE BY MOUTH EVERY DAY     polyethylene glycol 0.4%- propylene glycol 0.3% (SYSTANE ULTRA) 0.4-0.3 % SOLN ophthalmic solution Place 1-2 drops into both eyes daily as needed for dry eyes     pravastatin (PRAVACHOL) 40 MG tablet TAKE 1 TABLET(40 MG) BY MOUTH DAILY     Probiotic Product (PROBIOTIC-10) CAPS Take 1 capsule by mouth daily       Allergies   No Known Allergies     Family History   Family history includes Arrhythmia in her brother; Asthma in her brother and mother; Blood Disease in her mother; C.A.D. in her father, maternal grandfather, maternal grandmother, mother, and paternal grandmother; Cancer in her father; Colon Polyps in her brother and sister; Connective Tissue Disorder in her mother; Coronary Artery Disease in her brother; Dementia in her mother; Diabetes in her father; Eye Disorder in her mother; Hyperlipidemia in her mother and sister; Hypertension in her brother and mother; Lipid Disorder in her mother; Obesity in her brother and father; Osteoporosis in her mother and sister; Thyroid Disease in her mother.     Social History   He reports that she has never smoked. She has never used smokeless tobacco. She reports current alcohol use. She reports that she does not use drugs.    Review of Systems   Constitutional:  No fever, weight change or  "fatigue.    Eyes:     No dry eyes or vision changes.   Ears/Nose/Throat/Neck:  No oral ulcers, sore throat or voice change.    Cardiovascular:   No palpitations, syncope, angina or edema.   Respiratory:    No chest pain, excessive sleepiness, shortness of breath or hemoptysis.    Gastrointestinal:   No abdominal pain, nausea, vomiting, diarrhea or heartburn.    Genitourinary:   No dysuria, hematuria, urinary retention or urinary frequency.   Musculoskeletal:  No joint swelling or arthralgias.    Dermatologic:  No skin rash or other skin changes.   Neurologic:    No focal weakness or numbness. No neuropathy.   Psychiatric:    No depression, anxiety, suicidal ideation, or paranoid ideation.   Endocrine:   No cold or heat intolerance, polydipsia, hirsutism, change in libido, or flushing.   Hematology/Lymphatic:  No bleeding or lymphadenopathy.    Allergy/Immunology:  No rhinitis or hives.     Physical Exam   Vitals:  BP (!) 167/82, HR 85, RR 12, temperature 99  F (37.2  C), temperature source Temporal, height 1.549 m (5' 1\"), SpO2 100 %, not currently breastfeeding.    General:  Alert and oriented to person, place and time   Airway: Normal oropharyngeal airway and neck mobility   Lungs:  Clear bilaterally   Heart:  Regular rate and rhythm   Abdomen: Soft, NT, ND, no masses   Extremities: Warm, good capillary refill    ASA Grade: II (mild systemic disease)    Impression: Cleared for use of conscious sedation for colorectal cancer screening    Plan: Proceed with colonoscopy     Kandi Rust MD  [facility]  [phone]  "

## 2021-02-12 LAB — COPATH REPORT: NORMAL

## 2021-02-24 ENCOUNTER — OFFICE VISIT (OUTPATIENT)
Dept: FAMILY MEDICINE | Facility: CLINIC | Age: 71
End: 2021-02-24
Payer: COMMERCIAL

## 2021-02-24 VITALS
HEIGHT: 61 IN | DIASTOLIC BLOOD PRESSURE: 72 MMHG | HEART RATE: 64 BPM | SYSTOLIC BLOOD PRESSURE: 122 MMHG | OXYGEN SATURATION: 97 % | BODY MASS INDEX: 24.55 KG/M2 | TEMPERATURE: 98.1 F | WEIGHT: 130 LBS

## 2021-02-24 DIAGNOSIS — R20.2 NUMBNESS AND TINGLING IN RIGHT HAND: ICD-10-CM

## 2021-02-24 DIAGNOSIS — M79.641 PAIN OF RIGHT HAND: ICD-10-CM

## 2021-02-24 DIAGNOSIS — R20.0 NUMBNESS AND TINGLING IN RIGHT HAND: ICD-10-CM

## 2021-02-24 DIAGNOSIS — R05.3 CHRONIC COUGH: ICD-10-CM

## 2021-02-24 DIAGNOSIS — J30.89 SEASONAL ALLERGIC RHINITIS DUE TO OTHER ALLERGIC TRIGGER: ICD-10-CM

## 2021-02-24 DIAGNOSIS — A04.8 H. PYLORI INFECTION: Primary | ICD-10-CM

## 2021-02-24 DIAGNOSIS — K21.9 GASTROESOPHAGEAL REFLUX DISEASE, UNSPECIFIED WHETHER ESOPHAGITIS PRESENT: ICD-10-CM

## 2021-02-24 PROCEDURE — 99214 OFFICE O/P EST MOD 30 MIN: CPT | Performed by: FAMILY MEDICINE

## 2021-02-24 RX ORDER — OMEPRAZOLE 40 MG/1
40 CAPSULE, DELAYED RELEASE ORAL DAILY
Qty: 30 CAPSULE | Refills: 4 | Status: SHIPPED | OUTPATIENT
Start: 2021-02-24 | End: 2021-05-20

## 2021-02-24 RX ORDER — FEXOFENADINE HCL 180 MG/1
180 TABLET ORAL DAILY
COMMUNITY
Start: 2021-02-24 | End: 2023-01-19

## 2021-02-24 ASSESSMENT — MIFFLIN-ST. JEOR: SCORE: 1032.06

## 2021-02-24 NOTE — PROGRESS NOTES
Assessment & Plan     (K21.9) Gastroesophageal reflux disease, unspecified whether esophagitis present  Comment: improved , also treated for h pylori   Plan: omeprazole (PRILOSEC) 40 MG DR capsule,         fexofenadine (ALLEGRA) 180 MG tablet    (A04.8) H. pylori infection  (primary encounter diagnosis)  Comment: positive stool test - treated and improved 02/ 2021   Plan:       cough and GERD sx have improved. She is comfortable watching and will continue with Prilosec for may be additional couple of moths.   cares and symptomatic treatment discussed.  She will f/u as needed     (J30.89) Seasonal allergic rhinitis due to other allergic trigger  Comment:has mild postnasal drip possible cause of mild cough , improving still has minimal sx   Plan: fexofenadine (ALLEGRA) 180 MG tablet    (R05) Chronic cough  Comment: improved -, possible GERD / or allergy related   Plan: Cares and symptomatic treatment discussed.  follow up if problem       (M79.641) Pain of right hand  Comment: mostly thumb and index finger / tendonitis   Plan: improved     (R20.0,  R20.2) Numbness and tingling in right hand  Comment: improved using splint, still small numb spot on and off on thumb , could be splint itself   Plan:  Use splint as needed for any recurrent ongoing sx. Discussed stretches and symptomatic cares.  f/u if worsening or problem.   Consider further evaluation if no improvement or ongoing problem     Check labs. refill sent.Cares and  treatment discussed follow. up if problem   Patient expressed understanding and agreement with treatment plan. All patient's questions were answered, will let me know if has more later.  Medications: Rx's: Reviewed the potential side effects/complications of medications prescribed.     Lydia Durand MD  [facility] SHAMIKA Oshea is a 71 year old who presents for the following health issues     HPI       Concern - 6 week follow up for hand and  stomach  Onset: 6 weeks  Description: hand is feeling better.  Is wearing the brace during the day    didn't wear yesterday and felt pretty good.    Intensity: mild  Progression of Symptoms:  improving  Accompanying Signs & Symptoms: see above  Previous history of similar problem:   Precipitating factors:        Worsened by:   Alleviating factors:        Improved by:   Therapies tried and outcome:           GERD/Heartburn  Onset/Duration: has improved since [facility]. She had positive H pylori stool test ,  also was treated with antibiotics for  H pylori , so she is better .   Stomach is also a lot better and feeling comfortable now    Description: cough and GERD sx both have improved   still a little bit of cough in the morning , although she thinks it is likely  allergy related. But sx are mild and  not taking allergy pill .   Intensity: mild, moderate  Progression of Symptoms: improving  Accompanying Signs & Symptoms:  Does it feel like food gets stuck or trouble swallowing: no  Nausea: no  Vomiting (bloody?): no  Abdominal Pain: no  Black-Tarry stools: no  Bloody stools: no  History:  Previous similar episodes: YES  Previous ulcers: no  Precipitating factors:     Hand- follow hand  - has bene using wrist splint for  6 weeks and it has made a difference. Numbness has improved but jessi numbness has improved thumb can still has slight numb feeling .     Review of Systems   Constitutional, HEENT, cardiovascular, pulmonary, GI, , musculoskeletal, neuro, skin, endocrine and psych systems are negative, except as otherwise noted.      Objective    There were no vitals taken for this visit.  There is no height or weight on file to calculate BMI.  Physical Exam   GENERAL: healthy, alert and no distress  NECK: no adenopathy  RESP: lungs clear to auscultation - no rales, rhonchi or wheezes  CV: regular rate and rhythm, normal S1 S2, no S3 or S4, no murmur, click or rub, no peripheral edema and peripheral pulses  strong  ABDOMEN: soft, nontender, no hepatosplenomegaly, no masses and bowel sounds normal  MS: rt hand no acute tenderness on wrist, good ROM of fingers and wrist   NEURO: Normal strength and tone, mentation intact and speech normal  PSYCH: mentation appears normal, affect normal/bright

## 2021-03-06 PROBLEM — A04.8 H. PYLORI INFECTION: Status: ACTIVE | Noted: 2021-03-06

## 2021-03-23 ENCOUNTER — IMMUNIZATION (OUTPATIENT)
Dept: NURSING | Facility: CLINIC | Age: 71
End: 2021-03-23
Payer: COMMERCIAL

## 2021-03-23 PROCEDURE — 0001A PR COVID VAC PFIZER DIL RECON 30 MCG/0.3 ML IM: CPT

## 2021-03-23 PROCEDURE — 91300 PR COVID VAC PFIZER DIL RECON 30 MCG/0.3 ML IM: CPT

## 2021-04-13 ENCOUNTER — IMMUNIZATION (OUTPATIENT)
Dept: NURSING | Facility: CLINIC | Age: 71
End: 2021-04-13
Attending: INTERNAL MEDICINE
Payer: COMMERCIAL

## 2021-04-13 PROCEDURE — 0002A PR COVID VAC PFIZER DIL RECON 30 MCG/0.3 ML IM: CPT

## 2021-04-13 PROCEDURE — 91300 PR COVID VAC PFIZER DIL RECON 30 MCG/0.3 ML IM: CPT

## 2021-05-20 DIAGNOSIS — K21.9 GASTROESOPHAGEAL REFLUX DISEASE, UNSPECIFIED WHETHER ESOPHAGITIS PRESENT: ICD-10-CM

## 2021-05-20 RX ORDER — OMEPRAZOLE 40 MG/1
CAPSULE, DELAYED RELEASE ORAL
Qty: 90 CAPSULE | Refills: 0 | Status: SHIPPED | OUTPATIENT
Start: 2021-05-20 | End: 2021-08-19

## 2021-05-20 NOTE — TELEPHONE ENCOUNTER
Prescription approved per [facility] Refill Protocol.    Lacie PINEDO RN  EP Triage

## 2021-07-23 DIAGNOSIS — E78.5 HYPERLIPIDEMIA LDL GOAL <130: ICD-10-CM

## 2021-07-23 RX ORDER — ATORVASTATIN CALCIUM 20 MG/1
TABLET, FILM COATED ORAL
Qty: 90 TABLET | Refills: 1 | Status: SHIPPED | OUTPATIENT
Start: 2021-07-23 | End: 2022-01-24

## 2021-07-23 NOTE — TELEPHONE ENCOUNTER
Prescription approved per [facility] Refill Protocol.    Lacie PINEDO RN  EP Triage

## 2021-08-19 DIAGNOSIS — K21.9 GASTROESOPHAGEAL REFLUX DISEASE, UNSPECIFIED WHETHER ESOPHAGITIS PRESENT: ICD-10-CM

## 2021-08-19 RX ORDER — OMEPRAZOLE 40 MG/1
CAPSULE, DELAYED RELEASE ORAL
Qty: 90 CAPSULE | Refills: 1 | Status: SHIPPED | OUTPATIENT
Start: 2021-08-19 | End: 2021-09-09 | Stop reason: DRUGHIGH

## 2021-09-04 ENCOUNTER — HEALTH MAINTENANCE LETTER (OUTPATIENT)
Age: 71
End: 2021-09-04

## 2021-09-09 ENCOUNTER — OFFICE VISIT (OUTPATIENT)
Dept: FAMILY MEDICINE | Facility: CLINIC | Age: 71
End: 2021-09-09
Payer: COMMERCIAL

## 2021-09-09 VITALS
HEART RATE: 68 BPM | OXYGEN SATURATION: 100 % | WEIGHT: 125 LBS | BODY MASS INDEX: 23.62 KG/M2 | RESPIRATION RATE: 16 BRPM | TEMPERATURE: 98.1 F | SYSTOLIC BLOOD PRESSURE: 136 MMHG | DIASTOLIC BLOOD PRESSURE: 80 MMHG

## 2021-09-09 DIAGNOSIS — K21.9 GASTROESOPHAGEAL REFLUX DISEASE, UNSPECIFIED WHETHER ESOPHAGITIS PRESENT: ICD-10-CM

## 2021-09-09 DIAGNOSIS — Z23 NEED FOR PROPHYLACTIC VACCINATION AND INOCULATION AGAINST INFLUENZA: Primary | ICD-10-CM

## 2021-09-09 PROCEDURE — 90662 IIV NO PRSV INCREASED AG IM: CPT | Performed by: FAMILY MEDICINE

## 2021-09-09 PROCEDURE — G0008 ADMIN INFLUENZA VIRUS VAC: HCPCS | Performed by: FAMILY MEDICINE

## 2021-09-09 PROCEDURE — 99213 OFFICE O/P EST LOW 20 MIN: CPT | Mod: 25 | Performed by: FAMILY MEDICINE

## 2021-09-09 RX ORDER — OMEPRAZOLE 40 MG/1
CAPSULE, DELAYED RELEASE ORAL
Qty: 90 CAPSULE | Refills: 1 | Status: CANCELLED | OUTPATIENT
Start: 2021-09-09

## 2021-09-09 ASSESSMENT — PAIN SCALES - GENERAL: PAINLEVEL: NO PAIN (0)

## 2021-09-09 NOTE — PATIENT INSTRUCTIONS
Patient Education     GERD (Adult)    The esophagus is a tube that carries food from the mouth to the stomach. A valve (the LES, lower esophageal sphincter) at the lower end of the esophagus prevents stomach acid from flowing upward. When this valve doesn't work properly, stomach contents may repeatedly flow back up (reflux) into the esophagus. This is called gastroesophageal reflux disease (GERD). GERD can irritate the esophagus. It can cause problems with pain, swallowing or breathing. In severe cases, GERD can cause recurrent pneumonia (from aspiration or breathing in particles) or other serious problems.  Symptoms of reflux include burning, pressure or sharp pain in the upper abdomen or mid to lower chest. The pain can spread to the neck, back, or shoulder. There may be belching, an acid taste in the back of the throat, chronic cough, or sore throat, or hoarseness. GERD symptoms often occur during the day after a big meal. They can also occur at night when lying down.   Home care  Lifestyle changes can help reduce symptoms. If needed, your healthcare provider may prescribe medicines. Symptoms often improve with treatment, but if treatment is stopped, the symptoms often return after a few months. So most persons with GERD will need to continue treatment or get treatment on and off.  Lifestyle changes    Limit or avoid fatty, fried, and spicy foods, as well as coffee, chocolate, mint, and foods with high acid content such as tomatoes and citrus fruit and juices (orange, grapefruit, lemon).    Don t eat large meals, especially at night. Frequent, smaller meals are best. Don't lie down right after eating. And don t eat anything 3 hours before going to bed.    Don't drink alcohol or smoke. As much as possible, stay away from second hand smoke.    If you are overweight, losing weight will reduce symptoms.     Don't wear tight clothing around your stomach area.    If your symptoms occur during sleep, use a foam wedge  "to elevate your upper body (not just your head.) Or, place 4\" blocks under the head of your bed. Or use 2 bed risers under your bedframe.  Medicines  If needed, medicines can help relieve the symptoms of GERD and prevent damage to the esophagus. Discuss a medicine plan with your healthcare provider. This may include one or more of the following medicines:    Antacids to help neutralize the normal acids in your stomach.    Acid blockers (Histamine or H2 blockers) to decrease acid production.    Acid inhibitors (proton pump inhibitors PPIs) to decrease acid production in a different way than the blockers. They may work better, but can take a little longer to take effect.  Take an antacid 30 to 60 minutes after eating and at bedtime, but not at the same time as an acid blocker.  Try not to take medicines such as ibuprofen and aspirin. If you are taking aspirin for your heart or other medical reasons, talk to your healthcare provider about stopping it.  Follow-up care  Follow up with your healthcare provider or as advised by our staff.  When to seek medical advice  Call your healthcare provider if any of the following occur:    Stomach pain gets worse or moves to the lower right abdomen (appendix area)    Chest pain appears or gets worse, or spreads to the back, neck, shoulder, or arm    An over-the-counter trial of medicine doesn't relieve your symptoms    Weight loss that can't be explained    Trouble or pain swallowing    Frequent vomiting (can t keep down liquids)    Blood in the stool or vomit (red or black in color)    Feeling weak or dizzy    Fever of 100.4 F (38 C) or higher, or as directed by your healthcare provider  [employer] last reviewed this educational content on 3/1/2018    5812-2782 [employer]. All rights reserved. This information is not intended as a substitute for professional medical care. Always follow your healthcare professional's instructions.           "

## 2021-09-09 NOTE — PROGRESS NOTES
Assessment & Plan     (K21.9) Gastroesophageal reflux disease, unspecified whether esophagitis present  Comment: improved and stable   Plan: REVIEW OF HEALTH MAINTENANCE PROTOCOL ORDERS,         omeprazole (PRILOSEC) 20 MG DR capsule            Discussed possible differential diagnosis for her symptoms. Her  GERD,has improved after treating for  H pylori . Discussed other cause of  other causes of recurrent heart burn .  she is willing to try low dose of  Prilosec 20 mg'  to see if helps keep Sx under control.  talked about reflux precautions etc . Suggest follow up in 2-3 months , sooner if problem. Consider GI referral and further evaluation if needed.       (Z23) Need for prophylactic vaccination and inoculation against influenza  (primary encounter diagnosis)  Comment:   Plan: INFLUENZA, QUAD, HIGH DOSE, PF, 65YR + (FLUZONE        HD)     refill sent.Cares and  treatment discussed. follow up if problem   Patient expressed understanding and agreement with treatment plan. All patient's questions were answered, will let me know if has more later.  Medications: Rx's: Reviewed the potential side effects/complications of medications prescribed.       Lydia Durand MD  [facility] is a 71 year old who presents for the following health issues     HPI     Concern - follow up    Onset/Duration:   Has hx of some recurrent indigestion last couple of years. She has improved on Prilosec 40 mg .   She had positive H pylori stool test ,  also was treated with antibiotics for  H pylori recently  , so she is much better since.   She  has only occassional sx but willing to go down on the dose   Stomach is also a lot better and feeling comfortable now    Description: she has some cough earlier thought to be allergy or may be  GERD related - but cough  and GERD sx both have improved   still a little bit of cough on and off  in the morning , although she thinks it is likely   allergy related. But sx have improved .  she has taken allegra as needed,  not taking allergy pill as often now.      Intensity: mild, moderate  Progression of Symptoms: improved    pt would like to discuss omeprazole dosage  Intensity: moderate  Progression of Symptoms:  same  Accompanying Signs & Symptoms: none  Previous history of similar problem: yes  Precipitating factors:        Worsened by: none  Alleviating factors:        Improved by: none  Therapies tried and outcome:  none           Review of Systems   Constitutional, HEENT, cardiovascular, pulmonary, GI, , musculoskeletal, neuro, skin, endocrine and psych systems are negative, except as otherwise noted.      Objective    There were no vitals taken for this visit.  There is no height or weight on file to calculate BMI.  Physical Exam   GENERAL: healthy, alert and no distress  HENT: oropharynx clear and oral mucous membranes moist  NECK: no adenopathy, no asymmetry, masses, or scars and thyroid normal to palpation  RESP: lungs clear to auscultation - no rales, rhonchi or wheezes  CV: regular rate and rhythm, normal S1 S2, no S3 or S4,  ABDOMEN: soft, nontender, no hepatosplenomegaly, no masses and bowel sounds normal  NEURO: Normal strength and tone, mentation intact and speech normal  PSYCH: mentation appears normal, affect normal/bright

## 2021-12-02 DIAGNOSIS — K21.9 GASTROESOPHAGEAL REFLUX DISEASE, UNSPECIFIED WHETHER ESOPHAGITIS PRESENT: ICD-10-CM

## 2021-12-02 NOTE — TELEPHONE ENCOUNTER
Prescription approved per [facility] Refill Protocol.  Ester WASHINGTON RN  [facility]

## 2022-01-22 DIAGNOSIS — E78.5 HYPERLIPIDEMIA LDL GOAL <130: ICD-10-CM

## 2022-01-24 RX ORDER — ATORVASTATIN CALCIUM 20 MG/1
TABLET, FILM COATED ORAL
Qty: 90 TABLET | Refills: 0 | Status: SHIPPED | OUTPATIENT
Start: 2022-01-24 | End: 2022-02-15

## 2022-01-24 NOTE — TELEPHONE ENCOUNTER
Prescription approved per [facility] Refill Protocol.    Lacie PINEDO RN  EP Triage

## 2022-01-27 ENCOUNTER — OFFICE VISIT (OUTPATIENT)
Dept: FAMILY MEDICINE | Facility: CLINIC | Age: 72
End: 2022-01-27
Payer: COMMERCIAL

## 2022-01-27 VITALS
HEART RATE: 85 BPM | RESPIRATION RATE: 16 BRPM | DIASTOLIC BLOOD PRESSURE: 72 MMHG | HEIGHT: 61 IN | OXYGEN SATURATION: 98 % | BODY MASS INDEX: 23.49 KG/M2 | WEIGHT: 124.4 LBS | TEMPERATURE: 98.6 F | SYSTOLIC BLOOD PRESSURE: 138 MMHG

## 2022-01-27 DIAGNOSIS — R00.2 HEART PALPITATIONS: Primary | ICD-10-CM

## 2022-01-27 DIAGNOSIS — R10.84 ABDOMINAL PAIN, GENERALIZED: ICD-10-CM

## 2022-01-27 DIAGNOSIS — R94.31 NONSPECIFIC ABNORMAL ELECTROCARDIOGRAM (ECG) (EKG): ICD-10-CM

## 2022-01-27 LAB
ERYTHROCYTE [DISTWIDTH] IN BLOOD BY AUTOMATED COUNT: 12.7 % (ref 10–15)
HCT VFR BLD AUTO: 35.9 % (ref 35–47)
HGB BLD-MCNC: 12.1 G/DL (ref 11.7–15.7)
MCH RBC QN AUTO: 30.6 PG (ref 26.5–33)
MCHC RBC AUTO-ENTMCNC: 33.7 G/DL (ref 31.5–36.5)
MCV RBC AUTO: 91 FL (ref 78–100)
PLATELET # BLD AUTO: 286 10E3/UL (ref 150–450)
RBC # BLD AUTO: 3.96 10E6/UL (ref 3.8–5.2)
WBC # BLD AUTO: 5.8 10E3/UL (ref 4–11)

## 2022-01-27 PROCEDURE — 36415 COLL VENOUS BLD VENIPUNCTURE: CPT | Performed by: FAMILY MEDICINE

## 2022-01-27 PROCEDURE — 93000 ELECTROCARDIOGRAM COMPLETE: CPT | Performed by: FAMILY MEDICINE

## 2022-01-27 PROCEDURE — 99214 OFFICE O/P EST MOD 30 MIN: CPT | Performed by: FAMILY MEDICINE

## 2022-01-27 PROCEDURE — 80053 COMPREHEN METABOLIC PANEL: CPT | Performed by: FAMILY MEDICINE

## 2022-01-27 PROCEDURE — 85027 COMPLETE CBC AUTOMATED: CPT | Performed by: FAMILY MEDICINE

## 2022-01-27 ASSESSMENT — MIFFLIN-ST. JEOR: SCORE: 1001.65

## 2022-01-27 NOTE — PROGRESS NOTES
Assessment & Plan     Heart palpitations    Patient describe experiencing some chest discomfort ( like palpitation )   Unable to describe   Unable to describe any change with activity as she is not walking currently .      - EKG 12-lead complete w/read - Clinics  - new right bundle branch block non specific EKG CHANGES   - In view of symptoms , family history coronary artery disease   Risk factors - age  , hyperlipidemia .  Recommend stress test .      - Echocardiogram Exercise Stress; Future    Nonspecific abnormal electrocardiogram (ECG) (EKG)  - Echocardiogram Exercise Stress; Future    Abdominal pain, generalized  - Likely irritable bowel syndrome .  Discussed diet modification .    - CBC with platelets  - Comprehensive metabolic panel (BMP + Alb, Alk Phos, ALT, AST, Total. Bili, TP)        Return in about 4 weeks (around 2/24/2022) for Routine preventive, appointment already scheduled.    Enedina Smith MD  [facility] is a 72 year old who presents for the following health issues     History of Present Illness       She eats 2-3 servings of fruits and vegetables daily.She consumes 0 sweetened beverage(s) daily.She exercises with enough effort to increase her heart rate 10 to 19 minutes per day.  She exercises with enough effort to increase her heart rate 3 or less days per week.   She is taking medications regularly.       Patient is having ABD discomfort: Patient describes pain throughout over the last month. Comes and goes per day. Patient has been constipated lately.      Heart palpitations: Patient has a diagnosis of PVCs in 2018 Patient has been noticing the feeling more often since being home more often.         Review of Systems   Cardiovascular:        Palpitation , chest discomfort .   Gastrointestinal: Positive for abdominal distention and abdominal pain.   Psychiatric/Behavioral: Negative for agitation and behavioral problems.            Objective    BP  "(!) 148/72 (BP Location: Right arm, Patient Position: Sitting, Cuff Size: Adult Regular)   Pulse 85   Temp 98.6  F (37  C) (Oral)   Resp 16   Ht 1.549 m (5' 1\")   Wt 56.4 kg (124 lb 6.4 oz)   SpO2 98%   BMI 23.51 kg/m    Body mass index is 23.51 kg/m .  Physical Exam  Vitals reviewed.   Cardiovascular:      Pulses: Normal pulses.      Heart sounds: Normal heart sounds. No murmur heard.      Pulmonary:      Effort: No respiratory distress.      Breath sounds: Normal breath sounds.   Abdominal:      General: Abdomen is flat. Bowel sounds are normal. There is no distension.      Palpations: Abdomen is soft.   Neurological:      Mental Status: She is alert.                "

## 2022-01-28 ENCOUNTER — TELEPHONE (OUTPATIENT)
Dept: FAMILY MEDICINE | Facility: CLINIC | Age: 72
End: 2022-01-28
Payer: COMMERCIAL

## 2022-01-28 LAB
ALBUMIN SERPL-MCNC: 4 G/DL (ref 3.4–5)
ALP SERPL-CCNC: 46 U/L (ref 40–150)
ALT SERPL W P-5'-P-CCNC: 34 U/L (ref 0–50)
ANION GAP SERPL CALCULATED.3IONS-SCNC: 5 MMOL/L (ref 3–14)
AST SERPL W P-5'-P-CCNC: 20 U/L (ref 0–45)
BILIRUB SERPL-MCNC: 0.4 MG/DL (ref 0.2–1.3)
BUN SERPL-MCNC: 11 MG/DL (ref 7–30)
CALCIUM SERPL-MCNC: 9 MG/DL (ref 8.5–10.1)
CHLORIDE BLD-SCNC: 105 MMOL/L (ref 94–109)
CO2 SERPL-SCNC: 27 MMOL/L (ref 20–32)
CREAT SERPL-MCNC: 0.71 MG/DL (ref 0.52–1.04)
GFR SERPL CREATININE-BSD FRML MDRD: 89 ML/MIN/1.73M2
GLUCOSE BLD-MCNC: 91 MG/DL (ref 70–99)
POTASSIUM BLD-SCNC: 3.9 MMOL/L (ref 3.4–5.3)
PROT SERPL-MCNC: 6.9 G/DL (ref 6.8–8.8)
SODIUM SERPL-SCNC: 137 MMOL/L (ref 133–144)

## 2022-01-28 ASSESSMENT — ENCOUNTER SYMPTOMS
AGITATION: 0
ABDOMINAL PAIN: 1
ABDOMINAL DISTENTION: 1

## 2022-01-28 NOTE — TELEPHONE ENCOUNTER
Patient schedule her stress test for 2/11, wants to be sure this date is ok as she understood Dr. Smith mentioned she would like this done by 3/2.     Cristela Lassiter,

## 2022-02-11 ENCOUNTER — HOSPITAL ENCOUNTER (OUTPATIENT)
Dept: CARDIOLOGY | Facility: CLINIC | Age: 72
Discharge: HOME | End: 2022-02-11
Attending: FAMILY MEDICINE | Admitting: FAMILY MEDICINE
Payer: COMMERCIAL

## 2022-02-11 DIAGNOSIS — R00.2 HEART PALPITATIONS: ICD-10-CM

## 2022-02-11 DIAGNOSIS — R94.31 NONSPECIFIC ABNORMAL ELECTROCARDIOGRAM (ECG) (EKG): ICD-10-CM

## 2022-02-11 PROCEDURE — 93018 CV STRESS TEST I&R ONLY: CPT | Performed by: INTERNAL MEDICINE

## 2022-02-11 PROCEDURE — 93321 DOPPLER ECHO F-UP/LMTD STD: CPT | Mod: TC

## 2022-02-11 PROCEDURE — 93016 CV STRESS TEST SUPVJ ONLY: CPT | Performed by: INTERNAL MEDICINE

## 2022-02-11 PROCEDURE — 93350 STRESS TTE ONLY: CPT | Mod: 26 | Performed by: INTERNAL MEDICINE

## 2022-02-11 PROCEDURE — 93325 DOPPLER ECHO COLOR FLOW MAPG: CPT | Mod: 26 | Performed by: INTERNAL MEDICINE

## 2022-02-11 PROCEDURE — 93350 STRESS TTE ONLY: CPT | Mod: TC

## 2022-02-11 PROCEDURE — 93321 DOPPLER ECHO F-UP/LMTD STD: CPT | Mod: 26 | Performed by: INTERNAL MEDICINE

## 2022-02-15 ENCOUNTER — OFFICE VISIT (OUTPATIENT)
Dept: FAMILY MEDICINE | Facility: CLINIC | Age: 72
End: 2022-02-15
Payer: COMMERCIAL

## 2022-02-15 VITALS
RESPIRATION RATE: 16 BRPM | TEMPERATURE: 97.9 F | BODY MASS INDEX: 23.79 KG/M2 | OXYGEN SATURATION: 97 % | HEART RATE: 76 BPM | HEIGHT: 61 IN | WEIGHT: 126 LBS | SYSTOLIC BLOOD PRESSURE: 138 MMHG | DIASTOLIC BLOOD PRESSURE: 70 MMHG

## 2022-02-15 DIAGNOSIS — M85.80 AGE-RELATED BONE LOSS: ICD-10-CM

## 2022-02-15 DIAGNOSIS — Z00.00 ENCOUNTER FOR MEDICARE ANNUAL WELLNESS EXAM: Primary | ICD-10-CM

## 2022-02-15 DIAGNOSIS — E78.5 HYPERLIPIDEMIA LDL GOAL <130: ICD-10-CM

## 2022-02-15 DIAGNOSIS — I49.3 PVC'S (PREMATURE VENTRICULAR CONTRACTIONS): ICD-10-CM

## 2022-02-15 DIAGNOSIS — K21.9 GASTROESOPHAGEAL REFLUX DISEASE, UNSPECIFIED WHETHER ESOPHAGITIS PRESENT: ICD-10-CM

## 2022-02-15 DIAGNOSIS — R00.2 PALPITATIONS: ICD-10-CM

## 2022-02-15 PROBLEM — K30 ACID INDIGESTION: Status: RESOLVED | Noted: 2017-12-08 | Resolved: 2022-02-15

## 2022-02-15 PROCEDURE — 82306 VITAMIN D 25 HYDROXY: CPT | Performed by: INTERNAL MEDICINE

## 2022-02-15 PROCEDURE — 36415 COLL VENOUS BLD VENIPUNCTURE: CPT | Performed by: INTERNAL MEDICINE

## 2022-02-15 PROCEDURE — 99397 PER PM REEVAL EST PAT 65+ YR: CPT | Performed by: INTERNAL MEDICINE

## 2022-02-15 PROCEDURE — 80061 LIPID PANEL: CPT | Performed by: INTERNAL MEDICINE

## 2022-02-15 PROCEDURE — 83735 ASSAY OF MAGNESIUM: CPT | Performed by: INTERNAL MEDICINE

## 2022-02-15 PROCEDURE — 99213 OFFICE O/P EST LOW 20 MIN: CPT | Mod: 25 | Performed by: INTERNAL MEDICINE

## 2022-02-15 PROCEDURE — 84443 ASSAY THYROID STIM HORMONE: CPT | Performed by: INTERNAL MEDICINE

## 2022-02-15 RX ORDER — ATORVASTATIN CALCIUM 20 MG/1
TABLET, FILM COATED ORAL
Qty: 90 TABLET | Refills: 0 | Status: SHIPPED | OUTPATIENT
Start: 2022-02-15 | End: 2022-02-16

## 2022-02-15 RX ORDER — LANOLIN ALCOHOL/MO/W.PET/CERES
1000 CREAM (GRAM) TOPICAL DAILY
COMMUNITY
End: 2024-03-14

## 2022-02-15 RX ORDER — VIT C/E/ZN/COPPR/LUTEIN/ZEAXAN 250MG-90MG
CAPSULE ORAL 2 TIMES DAILY
COMMUNITY

## 2022-02-15 RX ORDER — SUCRALFATE 1 G/1
1 TABLET ORAL 4 TIMES DAILY
Qty: 40 TABLET | Refills: 1 | Status: SHIPPED | OUTPATIENT
Start: 2022-02-15 | End: 2022-02-24

## 2022-02-15 RX ORDER — CHLORAL HYDRATE 500 MG
2 CAPSULE ORAL DAILY
COMMUNITY
End: 2024-03-14

## 2022-02-15 RX ORDER — FAMOTIDINE 20 MG
TABLET ORAL
COMMUNITY
End: 2022-05-13

## 2022-02-15 RX ORDER — IBUPROFEN 200 MG
CAPSULE ORAL DAILY
COMMUNITY
End: 2023-01-19

## 2022-02-15 ASSESSMENT — ENCOUNTER SYMPTOMS
NAUSEA: 0
FEVER: 0
SORE THROAT: 0
FREQUENCY: 0
NERVOUS/ANXIOUS: 0
PALPITATIONS: 0
HEARTBURN: 0
CHILLS: 0
EYE PAIN: 0
JOINT SWELLING: 0
HEMATOCHEZIA: 0
CONSTIPATION: 0
SHORTNESS OF BREATH: 0
BREAST MASS: 0
DYSURIA: 0
HEADACHES: 0
ARTHRALGIAS: 0
MYALGIAS: 1
DIARRHEA: 0
DIZZINESS: 0
ABDOMINAL PAIN: 1
HEMATURIA: 0
PARESTHESIAS: 0
WEAKNESS: 0
COUGH: 0

## 2022-02-15 ASSESSMENT — ACTIVITIES OF DAILY LIVING (ADL): CURRENT_FUNCTION: NO ASSISTANCE NEEDED

## 2022-02-15 ASSESSMENT — PAIN SCALES - GENERAL: PAINLEVEL: NO PAIN (0)

## 2022-02-15 ASSESSMENT — MIFFLIN-ST. JEOR: SCORE: 1008.91

## 2022-02-15 NOTE — PATIENT INSTRUCTIONS
"As discussed will do fasting lab works and plan for need for holter monitor studies again if the PVCs are bothering you too many times we can plan holter monitor studies after you return back from your travel.     Medications sent in for flatulance symptom as discussed.   =================================================================    Patient Education   Personalized Prevention Plan  You are due for the preventive services outlined below.  Your care team is available to assist you in scheduling these services.  If you have already completed any of these items, please share that information with your care team to update in your medical record.  Health Maintenance Due   Topic Date Due     FALL RISK ASSESSMENT  01/14/2022         Patient Education     Heart Palpitations    Palpitations are the feeling that your heart is beating hard, fast, or irregular. Some describe it as \"pounding,\" \"flip-flopping in the chest,\" or \"skipped beats.\" Palpitations may occur in someone with heart disease. But they can also occur in a healthy person.  Heart-related causes:    Heart rhythm problem (arrhythmia)    Heart valve disease    Disease of the heart muscle (cardiomyopathy)    Coronary artery disease    High blood pressure  Non-heart-related causes:    Certain medicines such as asthma inhalers and decongestants    Some herbal supplements, energy drinks and pills, and weight loss pills    Illegal stimulant drugs such as cocaine, crank, methamphetamine, PCP, bath salts, and ecstasy    Caffeine, alcohol, and tobacco    Health conditions such as thyroid disease, anemia, anxiety, and panic disorder  Sometimes the cause can't be found.  Home care  Follow these home care tips:    Don't use too much caffeine, alcohol, or tobacco, or any stimulant drugs.    Tell your doctor about any prescription or over-the-counter or herbal medicines you take.  Follow-up care    Follow up with your doctor, or as advised.    Call 911  This is the " fastest and safest way to get to the emergency department. The paramedics can also start treatment on the way to the hospital, if needed.  Don't wait until your symptoms are severe to call 911. These are reasons to call 911:    Chest pain    Shortness of breath    Feeling lightheaded, faint, or dizzy, or losing consciousness    Very irregular heartbeat    Rapid heartbeat that makes you uncomfortable    Slower than usual heart rate along with symptoms    Chest pain with weakness, dizziness, heavy sweating, nausea, or vomiting    Extreme drowsiness, confusion, or weakness    Weakness of an arm or leg, or on one side of the face    Trouble with speech or vision  When to seek medical advice  Call your healthcare provider right away if you have palpitations that last longer than normal, or are different from your past palpitations.  [employer] last reviewed this educational content on 11/1/2019 2000-2021 [employer]. All rights reserved. This information is not intended as a substitute for professional medical care. Always follow your healthcare professional's instructions.

## 2022-02-15 NOTE — PROGRESS NOTES
"SUBJECTIVE:   Dorie Gagnon is a 72 year old female who presents for Preventive Visit.      Patient has been advised of split billing requirements and indicates understanding: Yes  Are you in the first 12 months of your Medicare coverage?      Healthy Habits:     In general, how would you rate your overall health?  Good    Frequency of exercise:  2-3 days/week    Duration of exercise:  15-30 minutes    Do you usually eat at least 4 servings of fruit and vegetables a day, include whole grains    & fiber and avoid regularly eating high fat or \"junk\" foods?  Yes    Taking medications regularly:  Yes    Medication side effects:  None    Ability to successfully perform activities of daily living:  No assistance needed    Home Safety:  No safety concerns identified    Hearing Impairment:  No hearing concerns    In the past 6 months, have you been bothered by leaking of urine?  No    In general, how would you rate your overall mental or emotional health?  Good      PHQ-2 Total Score: 0    Do you feel safe in your environment? Yes    Have you ever done Advance Care Planning? (For example, a Health Directive, POLST, or a discussion with a medical provider or your loved ones about your wishes): N/A    Hearing Acuity: Mild deficits as patient was unable to follow conversation without repeating each of the question.     Fall risk  Fallen 2 or more times in the past year?: No  Any fall with injury in the past year?: No    Cognitive Screening   1) Repeat 3 items (Leader, Season, Table)    2) Clock draw: NORMAL  3) 3 item recall: Recalls 3 objects  Results: 3 items recalled: COGNITIVE IMPAIRMENT LESS LIKELY    Mini-CogTM Copyright JAVI Chacon. Licensed by the author for use in Health system; reprinted with permission (fely@.[facility]). All rights reserved.      Do you have sleep apnea, excessive snoring or daytime drowsiness?: no    Reviewed and updated as needed this visit by clinical staff  Tobacco  Allergies  Meds  " Problems  Med Hx  Surg Hx  Fam Hx         Reviewed and updated as needed this visit by Provider  Tobacco  Allergies  Meds  Problems  Med Hx  Surg Hx  Fam Hx        Social History     Tobacco Use     Smoking status: Never Smoker     Smokeless tobacco: Never Used   Substance Use Topics     Alcohol use: Yes     Comment: 1 wine per day     If you drink alcohol do you typically have >3 drinks per day or >7 drinks per week? Yes    Alcohol Use 2/15/2022   Prescreen: >3 drinks/day or >7 drinks/week? No   Prescreen: >3 drinks/day or >7 drinks/week? -   No flowsheet data found.    Current providers sharing in care for this patient include:   Patient Care Team:  Lydia Durand MD as PCP - General (Family Practice)  Lydia Durand MD as Assigned PCP    The following health maintenance items are reviewed in Epic and correct as of today:  Health Maintenance Due   Topic Date Due     FALL RISK ASSESSMENT  01/14/2022     Lab work is in process  Labs reviewed in [facility]  Mammogram Screening: Mammogram Screening: Recommended mammography every 1-2 years with patient discussion and risk factor consideration        Review of Systems   Constitutional: Negative for chills and fever.   HENT: Negative for congestion, ear pain, hearing loss and sore throat.    Eyes: Negative for pain and visual disturbance.   Respiratory: Negative for cough and shortness of breath.    Cardiovascular: Negative for chest pain, palpitations and peripheral edema.   Gastrointestinal: Positive for abdominal pain. Negative for constipation, diarrhea, heartburn, hematochezia and nausea.   Breasts:  Negative for tenderness, breast mass and discharge.   Genitourinary: Negative for dysuria, frequency, genital sores, hematuria, pelvic pain, urgency, vaginal bleeding and vaginal discharge.   Musculoskeletal: Positive for myalgias. Negative for arthralgias and joint swelling.   Skin: Negative for rash.   Neurological: Negative for dizziness, weakness,  "headaches and paresthesias.   Psychiatric/Behavioral: Negative for mood changes. The patient is not nervous/anxious.      Constitutional, HEENT, cardiovascular, pulmonary, GI, , musculoskeletal, neuro, skin, endocrine and psych systems are negative, except as otherwise noted.    OBJECTIVE:   /70   Pulse 76   Temp 97.9  F (36.6  C) (Tympanic)   Resp 16   Ht 1.549 m (5' 1\")   Wt 57.2 kg (126 lb)   SpO2 97%   BMI 23.81 kg/m   Estimated body mass index is 23.81 kg/m  as calculated from the following:    Height as of this encounter: 1.549 m (5' 1\").    Weight as of this encounter: 57.2 kg (126 lb).  Physical Exam  GENERAL APPEARANCE: healthy, alert and no distress  EYES: Eyes grossly normal to inspection, PERRL and conjunctivae and sclerae normal  HENT: ear canals and TM's normal, nose and mouth without ulcers or lesions, oropharynx clear and oral mucous membranes moist  NECK: no adenopathy, no asymmetry, masses, or scars and thyroid normal to palpation  RESP: lungs clear to auscultation - no rales, rhonchi or wheezes  BREAST: Defered, pt will schedule mammogram for this year.  CV: regular rate and rhythm, normal S1 S2, no S3 or S4, no murmur, click or rub, no peripheral edema and peripheral pulses strong  ABDOMEN: soft, nontender, no hepatosplenomegaly, no masses and bowel sounds normal  MS: no musculoskeletal defects are noted and gait is age appropriate without ataxia  SKIN: no suspicious lesions or rashes  NEURO: Normal strength and tone, sensory exam grossly normal, mentation intact and speech normal  PSYCH: mentation appears normal and affect normal/bright    Diagnostic Test Results:  Labs reviewed in Epic    ASSESSMENT / PLAN:     Assessment and Plan  1. Encounter for Medicare annual wellness exam  Pt is new to me, recently seen [facility] for palpitations on 1/27/22 and EKG showing RBBB , she was ordered for stress Echo which is negative for ischemia but does have PVCs. She will need electrolyte " check which was normal K at that time. Magnesium , TSH not done .     - Pt is here for AWV & ongoing palpitations. Discussed on recheck of Holter monitor . Abdominal pain, following IBS management as per previous provider visit.  No CT abdomen seen in [facility] anytime. Was treated for GERD in the past.   - atorvastatin (LIPITOR) 20 MG tablet; TAKE 1 TABLET BY MOUTH EVERY DAY. STOP PRAVASTATIN  Dispense: 90 tablet; Refill: 0  - omeprazole (PRILOSEC) 20 MG DR capsule; TAKE 1 CAPSULE BY MOUTH EVERY DAY  Dispense: 90 capsule; Refill: 2  - sucralfate (CARAFATE) 1 GM tablet; Take 1 tablet (1 g) by mouth 4 times daily  Dispense: 40 tablet; Refill: 1  - Lipid panel reflex to direct LDL Fasting; Future  - TSH with free T4 reflex; Future  - Magnesium; Future  - Vitamin D Deficiency; Future  - Lipid panel reflex to direct LDL Fasting  - TSH with free T4 reflex  - Magnesium  - Vitamin D Deficiency    2. Hyperlipidemia LDL goal <130  - atorvastatin (LIPITOR) 20 MG tablet; TAKE 1 TABLET BY MOUTH EVERY DAY. STOP PRAVASTATIN  Dispense: 90 tablet; Refill: 0  - Lipid panel reflex to direct LDL Fasting; Future  - Lipid panel reflex to direct LDL Fasting    3. Gastroesophageal reflux disease, unspecified whether esophagitis present  - omeprazole (PRILOSEC) 20 MG DR capsule; TAKE 1 CAPSULE BY MOUTH EVERY DAY  Dispense: 90 capsule; Refill: 2  - sucralfate (CARAFATE) 1 GM tablet; Take 1 tablet (1 g) by mouth 4 times daily  Dispense: 40 tablet; Refill: 1    4. Palpitations  - TSH with free T4 reflex; Future  - TSH with free T4 reflex    5. PVC's (premature ventricular contractions)  - Magnesium; Future  - Magnesium    6. Age-related bone loss  - Vitamin D Deficiency; Future  - Vitamin D Deficiency     Patient Instructions     As discussed will do fasting lab works and plan for need for holter monitor studies again if the PVCs are bothering you too many times we can plan holter monitor studies after you return back from your travel.  "    Medications sent in for flatulance symptom as discussed.   =================================================================    Patient Education   Personalized Prevention Plan  You are due for the preventive services outlined below.  Your care team is available to assist you in scheduling these services.  If you have already completed any of these items, please share that information with your care team to update in your medical record.  Health Maintenance Due   Topic Date Due     FALL RISK ASSESSMENT  01/14/2022         Patient Education     Heart Palpitations    Palpitations are the feeling that your heart is beating hard, fast, or irregular. Some describe it as \"pounding,\" \"flip-flopping in the chest,\" or \"skipped beats.\" Palpitations may occur in someone with heart disease. But they can also occur in a healthy person.  Heart-related causes:    Heart rhythm problem (arrhythmia)    Heart valve disease    Disease of the heart muscle (cardiomyopathy)    Coronary artery disease    High blood pressure  Non-heart-related causes:    Certain medicines such as asthma inhalers and decongestants    Some herbal supplements, energy drinks and pills, and weight loss pills    Illegal stimulant drugs such as cocaine, crank, methamphetamine, PCP, bath salts, and ecstasy    Caffeine, alcohol, and tobacco    Health conditions such as thyroid disease, anemia, anxiety, and panic disorder  Sometimes the cause can't be found.  Home care  Follow these home care tips:    Don't use too much caffeine, alcohol, or tobacco, or any stimulant drugs.    Tell your doctor about any prescription or over-the-counter or herbal medicines you take.  Follow-up care    Follow up with your doctor, or as advised.    Call 911  This is the fastest and safest way to get to the emergency department. The paramedics can also start treatment on the way to the hospital, if needed.  Don't wait until your symptoms are severe to call 911. These are reasons to " "call 911:    Chest pain    Shortness of breath    Feeling lightheaded, faint, or dizzy, or losing consciousness    Very irregular heartbeat    Rapid heartbeat that makes you uncomfortable    Slower than usual heart rate along with symptoms    Chest pain with weakness, dizziness, heavy sweating, nausea, or vomiting    Extreme drowsiness, confusion, or weakness    Weakness of an arm or leg, or on one side of the face    Trouble with speech or vision  When to seek medical advice  Call your healthcare provider right away if you have palpitations that last longer than normal, or are different from your past palpitations.  [email or web address] last reviewed this educational content on 11/1/2019 2000-2021 [employer]. All rights reserved. This information is not intended as a substitute for professional medical care. Always follow your healthcare professional's instructions.               Return in about 3 months (around 5/15/2022), or if symptoms worsen or fail to improve, for Follow up.    Lori Escamilla MD  [facility]      Patient has been advised of split billing requirements and indicates understanding: Yes    COUNSELING:  Reviewed preventive health counseling, as reflected in patient instructions  Special attention given to:       Regular exercise       Healthy diet/nutrition       Vision screening       Hearing screening       Dental care       Bladder control       Fall risk prevention       Osteoporosis prevention/bone health    Estimated body mass index is 23.81 kg/m  as calculated from the following:    Height as of this encounter: 1.549 m (5' 1\").    Weight as of this encounter: 57.2 kg (126 lb).        She reports that she has never smoked. She has never used smokeless tobacco.      Appropriate preventive services were discussed with this patient, including applicable screening as appropriate for cardiovascular disease, diabetes, osteopenia/osteoporosis, and glaucoma.  As " appropriate for age/gender, discussed screening for colorectal cancer, prostate cancer, breast cancer, and cervical cancer. Checklist reviewing preventive services available has been given to the patient.    Reviewed patients plan of care and provided an AVS. The Basic Care Plan (routine screening as documented in Health Maintenance) for Dorie meets the Care Plan requirement. This Care Plan has been established and reviewed with the Patient.    Counseling Resources:  ATP IV Guidelines  Pooled Cohorts Equation Calculator  Breast Cancer Risk Calculator  Breast Cancer: Medication to Reduce Risk  FRAX Risk Assessment  ICSI Preventive Guidelines  Dietary Guidelines for Americans, 2010  USDA's MyPlate  ASA Prophylaxis  Lung CA Screening    Lori Escamilla MD  [facility]    Identified Health Risks:

## 2022-02-16 DIAGNOSIS — E78.5 HYPERLIPIDEMIA LDL GOAL <130: Primary | ICD-10-CM

## 2022-02-16 DIAGNOSIS — E78.5 DYSLIPIDEMIA: ICD-10-CM

## 2022-02-16 LAB
CHOLEST SERPL-MCNC: 166 MG/DL
DEPRECATED CALCIDIOL+CALCIFEROL SERPL-MC: 94 UG/L (ref 20–75)
FASTING STATUS PATIENT QL REPORTED: YES
HDLC SERPL-MCNC: 72 MG/DL
LDLC SERPL CALC-MCNC: 83 MG/DL
MAGNESIUM SERPL-MCNC: 2.2 MG/DL (ref 1.6–2.3)
NONHDLC SERPL-MCNC: 94 MG/DL
TRIGL SERPL-MCNC: 56 MG/DL
TSH SERPL DL<=0.005 MIU/L-ACNC: 1.27 MU/L (ref 0.4–4)

## 2022-02-16 RX ORDER — ATORVASTATIN CALCIUM 40 MG/1
40 TABLET, FILM COATED ORAL DAILY
Qty: 90 TABLET | Refills: 1 | Status: SHIPPED | OUTPATIENT
Start: 2022-02-16 | End: 2022-02-17

## 2022-02-17 ENCOUNTER — TELEPHONE (OUTPATIENT)
Dept: FAMILY MEDICINE | Facility: CLINIC | Age: 72
End: 2022-02-17
Payer: COMMERCIAL

## 2022-02-17 DIAGNOSIS — E78.5 HYPERLIPIDEMIA LDL GOAL <130: Primary | ICD-10-CM

## 2022-02-17 RX ORDER — ATORVASTATIN CALCIUM 20 MG/1
20 TABLET, FILM COATED ORAL DAILY
Qty: 90 TABLET | Refills: 1 | Status: SHIPPED | OUTPATIENT
Start: 2022-02-17 | End: 2022-08-17

## 2022-02-17 NOTE — TELEPHONE ENCOUNTER
Please see the correction on the result note.     Thank you,  Lori Escamilla MD on 2/17/2022 at 1:09 PM

## 2022-02-17 NOTE — TELEPHONE ENCOUNTER
----- Message from Lori Escamilla MD sent at 2/16/2022 10:57 PM CST -----  Your Vitamin D levels are abnormally high causing toxicity . Please hold off any supplements you are taking for 2 weeks and later start only on 1000  Mcg daily.     Your Cholesterol levels are remaining abnormal inspite of your current Lipitor. I have increased the dose of medication and sent to your pharmacy for improvement.     Lori Escamilla MD on 2/16/2022

## 2022-02-17 NOTE — TELEPHONE ENCOUNTER
Detailed message left with info from provider and return number to call with any questions or concerns.    Lacie PINEDO RN  EP Triage

## 2022-02-17 NOTE — TELEPHONE ENCOUNTER
Pt calling states her cholesterol labs are within the normal range and wondering if she really needs to increase lipitor dosing?    Wondering if she should  the sucralfate refill and take with her to [place] or wait until she comes back?  Should she take the medication as needed?    Pt can be reached at [phone]    Lacie PINEDO RN  EP Triage

## 2022-02-18 NOTE — TELEPHONE ENCOUNTER
Please see pt's other question below regarding the sulcrafate. She leaves for [place]. Thank you.  She García,

## 2022-02-19 NOTE — TELEPHONE ENCOUNTER
She can pick Sucralfate as prescribed, reason we prescribed tablet form in her OV, which she can carry easily to [place]. I am not sure why patient is asking the same question again.     Thank you,  Lori Escamilla MD on 2/18/2022

## 2022-05-06 NOTE — PROGRESS NOTES
Assessment and Plan  1. PVC's (premature ventricular contractions)  Uncontrolled, pt does have palpitations sensations which she endorses. Denies any other symptoms . Electrolyte check in recent AWV were normal. Will consider Holter monitor at this time to make sure if she needs Cardiologist on board.   - Adult Holter Monitor 48 hour; Future    2. Need for vaccination  - TD PRESERV FREE, IM (7+ YRS) (DECAVAC/TENIVAC)       Patient Instructions   As discussed, please do the Holter monitor studies as ordered.     ============================================        Return in about 6 months (around 11/13/2022), or if symptoms worsen or fail to improve, for Follow up of last visit.    Lori Escamilla MD  [facility] SHAMIKA Oshea is a 72 year old who presents for the following health issues       History of Present Illness       Reason for visit:   Requested follow-up from Feb. Appt.  Symptom onset:  More than a month    She eats 4 or more servings of fruits and vegetables daily.She consumes 0 sweetened beverage(s) daily.She exercises with enough effort to increase her heart rate 30 to 60 minutes per day.  She exercises with enough effort to increase her heart rate 4 days per week.   She is taking medications regularly.     Last seen pt on 2/2022 for AWV and establish care. Pt does have RBBB and willl need holter monitor.      No Known Allergies     Past Medical History:   Diagnosis Date     Allergy to cats     nasal symptoms     Arthritis 2015    x-ray     Family history of ischemic heart disease     parents and brother with CAD onset age 60s     Hidradenitis     recurrent, groin     Influenza with other manifestations 1972    hospitalized     Menopause 1993    age 43     Mixed hyperlipidemia      Osteopenia 2003 2014 FRAX: 12% risk major osteoporotic fx, 2.2% risk hip fx     Status post bunionectomy 05/2012    right with hammertoe repairs       Past Surgical History:    Procedure Laterality Date     BUNIONECTOMY Left 2019    Procedure: LEFT HALLIX VAGUS AND SECOND CLAWTOE RECONSTRUCTION;  Surgeon: Tyler Jameson MD;  Location:  OR     BUNIONECTOMY KATIE  2012    Procedure:BUNIONECTOMY KATIE; RIGHT FOOT EULA KATIE BUNIONECTOMY, HAMMER TOE RECONSTRUCTION DIGITS TWO, THREE, FOUR AND FIVE; Surgeon:DEANDRE MONTE; Location: [facility]     C DEXA INTERPRETATION, AXIAL  2003    T score femur -1.3, lumbar -1.0     C DEXA INTERPRETATION, AXIAL  2004    T score lumbar -1.2, femoral -1.4     C DEXA INTERPRETATION, AXIAL  2007    T score lumbar -1.5 (decline -2.8%/yr), femoral neck -1.3/-1.8 (decline -1.8%/yr)     COLONOSCOPY       COLONOSCOPY  2000    normal, repeat 10 years     COLONOSCOPY  10/2010    normal, repeat 5 years     DEXA  10/2010    T score lumbar -1.2(stable), femoral neck -1.8/-2.2 with BMD 0.755 (6% worsening)     HC TOOTH EXTRACTION W/FORCEP      wisdom teeth extracted     REPAIR HAMMER TOE  2012    Procedure:REPAIR HAMMER TOE; Surgeon:DEANDRE MONTE; Location: [facility]     STRESS ECHO  10/2010    normal LV function at rest, hyperdynamic with exercise, EF 55-60% at rest and 70% post exercise     STRESS ECHO  10/2010    negative for echo/EKG changes of ischemia, normal LV fct with EF 55-60%, systolic fct hyperdynamic with exercise, exercised 7 min, EK.5 mm upsloping ST depression inf lead     STRESS ECHO (METRO)  2004    negative, exercise 8-9 min     Z DEXA INTERPRETATION, AXIAL  2014    T score L1-4 of -1.4 (dec 2.7%), femoral neck -1.9/-2.2 with BMD 0.728 (dec 0.8%)       Family History   Problem Relation Age of Onset     C.A.D. Father         MI age 61,   age 76 from MI     Diabetes Father      Cancer Father         esophagus, former smoker     Obesity Father      Coronary Artery Disease Father              Other Cancer Father      C.A.D. Mother         MI age 62     Lipids Mother      Blood Disease Mother          pernicious anemia     Thyroid Disease Mother      Eye Disorder Mother         macular degeneration     Osteoporosis Mother      Connective Tissue Disorder Mother         SLE - remission     Hypertension Mother      Hyperlipidemia Mother      Asthma Mother      Dementia Mother         memory loss, onset early 90s     Coronary Artery Disease Mother              C.A.D. Maternal Grandmother          early 80s     C.A.D. Maternal Grandfather          at younger age     C.A.D. Paternal Grandmother          late 70s     Hypertension Brother      Prostate Cancer Brother      Colon Polyps Brother         sister may also have colon polyps     Coronary Artery Disease Brother         angioplasty     Asthma Brother      Obesity Brother         No longer obese     Arrhythmia Brother      Hyperlipidemia Sister      Osteoporosis Sister         she has osteopenia     Colon Polyps Sister         possible       Social History     Tobacco Use     Smoking status: Never Smoker     Smokeless tobacco: Never Used   Substance Use Topics     Alcohol use: Not Currently     Comment: Wine occasionally        Current Outpatient Medications   Medication     atorvastatin (LIPITOR) 20 MG tablet     calcium carbonate 500 mg, elemental, (OSCAL 500) 1250 (500 Ca) MG TABS tablet     Calcium-Vitamin D-Vitamin K 500-1000-40 MG-UNT-MCG CHEW     carboxymethylcellulose PF (REFRESH PLUS) 0.5 % SOLN ophthalmic solution     Coenzyme Q10-Vitamin E (QUNOL ULTRA COQ10 PO)     cyanocobalamin (VITAMIN B-12) 1000 MCG tablet     fexofenadine (ALLEGRA) 180 MG tablet     fish oil-omega-3 fatty acids 1000 MG capsule     melatonin 1 MG TABS     Multiple Vitamins-Minerals (PRESERVISION AREDS 2) CHEW     omeprazole (PRILOSEC) 20 MG DR capsule     polyethylene glycol 0.4%- propylene glycol 0.3% (SYSTANE ULTRA) 0.4-0.3 % SOLN ophthalmic solution     Probiotic Product (PROBIOTIC-10) CAPS     sucralfate (CARAFATE) 1 GM tablet     UNABLE TO FIND     No  "current facility-administered medications for this visit.        Review of Systems   Constitutional, HEENT, cardiovascular, pulmonary, GI, , musculoskeletal, neuro, skin, endocrine and psych systems are negative, except as otherwise noted.      Objective    /82   Pulse 81   Temp 98.5  F (36.9  C) (Temporal)   Resp 16   Ht 1.549 m (5' 1\")   Wt 56.7 kg (125 lb)   SpO2 97%   BMI 23.62 kg/m    Body mass index is 23.62 kg/m .  Physical Exam   GENERAL: healthy, alert and no distress  NECK: no adenopathy, no asymmetry, masses, or scars and thyroid normal to palpation  RESP: lungs clear to auscultation - no rales, rhonchi or wheezes  CV: regular rate and rhythm, normal S1 S2, no S3 or S4, no murmur, click or rub, no peripheral edema and peripheral pulses strong  ABDOMEN: soft, nontender, no hepatosplenomegaly, no masses and bowel sounds normal  MS: no gross musculoskeletal defects noted, no edema      "

## 2022-05-13 ENCOUNTER — OFFICE VISIT (OUTPATIENT)
Dept: FAMILY MEDICINE | Facility: CLINIC | Age: 72
End: 2022-05-13
Payer: COMMERCIAL

## 2022-05-13 VITALS
BODY MASS INDEX: 23.6 KG/M2 | OXYGEN SATURATION: 97 % | DIASTOLIC BLOOD PRESSURE: 82 MMHG | RESPIRATION RATE: 16 BRPM | WEIGHT: 125 LBS | HEART RATE: 81 BPM | TEMPERATURE: 98.5 F | SYSTOLIC BLOOD PRESSURE: 136 MMHG | HEIGHT: 61 IN

## 2022-05-13 DIAGNOSIS — I49.3 PVC'S (PREMATURE VENTRICULAR CONTRACTIONS): Primary | ICD-10-CM

## 2022-05-13 DIAGNOSIS — Z23 NEED FOR VACCINATION: ICD-10-CM

## 2022-05-13 PROCEDURE — 99213 OFFICE O/P EST LOW 20 MIN: CPT | Performed by: INTERNAL MEDICINE

## 2022-05-13 ASSESSMENT — PAIN SCALES - GENERAL: PAINLEVEL: NO PAIN (0)

## 2022-05-13 NOTE — PATIENT INSTRUCTIONS
As discussed, please do the Holter monitor studies as ordered.     ============================================

## 2022-05-24 ENCOUNTER — HOSPITAL ENCOUNTER (OUTPATIENT)
Dept: CARDIOLOGY | Facility: CLINIC | Age: 72
Discharge: HOME | End: 2022-05-24
Attending: INTERNAL MEDICINE | Admitting: INTERNAL MEDICINE
Payer: COMMERCIAL

## 2022-05-24 DIAGNOSIS — I49.3 PVC'S (PREMATURE VENTRICULAR CONTRACTIONS): ICD-10-CM

## 2022-05-24 PROCEDURE — 93227 XTRNL ECG REC<48 HR R&I: CPT | Performed by: INTERNAL MEDICINE

## 2022-05-24 PROCEDURE — 93225 XTRNL ECG REC<48 HRS REC: CPT

## 2022-06-01 DIAGNOSIS — R00.2 PALPITATIONS: ICD-10-CM

## 2022-06-01 DIAGNOSIS — I49.3 PVC'S (PREMATURE VENTRICULAR CONTRACTIONS): ICD-10-CM

## 2022-06-01 DIAGNOSIS — R94.31 NONSPECIFIC ABNORMAL ELECTROCARDIOGRAM (ECG) (EKG): ICD-10-CM

## 2022-06-01 DIAGNOSIS — R94.31 ABNORMAL HOLTER MONITOR FINDING: Primary | ICD-10-CM

## 2022-06-07 ENCOUNTER — OFFICE VISIT (OUTPATIENT)
Dept: CARDIOLOGY | Facility: CLINIC | Age: 72
End: 2022-06-07
Payer: COMMERCIAL

## 2022-06-07 VITALS
HEIGHT: 61 IN | HEART RATE: 78 BPM | WEIGHT: 124.4 LBS | DIASTOLIC BLOOD PRESSURE: 84 MMHG | SYSTOLIC BLOOD PRESSURE: 178 MMHG | BODY MASS INDEX: 23.49 KG/M2

## 2022-06-07 DIAGNOSIS — R00.2 PALPITATIONS: ICD-10-CM

## 2022-06-07 DIAGNOSIS — I49.3 PVC'S (PREMATURE VENTRICULAR CONTRACTIONS): ICD-10-CM

## 2022-06-07 DIAGNOSIS — R94.31 NONSPECIFIC ABNORMAL ELECTROCARDIOGRAM (ECG) (EKG): ICD-10-CM

## 2022-06-07 DIAGNOSIS — R94.31 ABNORMAL HOLTER MONITOR FINDING: ICD-10-CM

## 2022-06-07 PROCEDURE — 99204 OFFICE O/P NEW MOD 45 MIN: CPT | Performed by: INTERNAL MEDICINE

## 2022-06-07 NOTE — LETTER
6/7/2022    Lori Escamlila MD  [address]  [place] [address]    RE: Dorie Gagnon       Dear Colleague,     I had the pleasure of seeing Dorie Gagnon in the [facility].  HPI and Plan:     Dorie Gagnon is a pleasant 72-year-old comes into clinic today with her .  She is referred here by her primary physician for PVCs.  She has a history of hyperlipidemia.  She has no chest pain chest pressure shortness of breath.  She walks but does no other regular exercise.  She has noticed increased heart palpitations and she actually caused some PVCs.  A Holter monitor 48-hour done recently shows less than 1% PVC burden with 65 PVCs in a 48-hour period.  She also had minimal SVT.  There was no runs of ventricular tachycardia.  Earlier this year in February she had a stress echocardiogram which I personally reviewed.  Normal LV size and function.  The reader states at least mild aortic stenosis but I do not see any gradient determined.  There was also mild aortic insufficiency.  There was no EKG changes.  She has had no syncope near syncope PND orthopnea or pedal edema.    She is quite hypertensive today and have asked her to do a log of her blood pressures at home over the next couple of weeks.  She should bring that back with her when she sees my SAL.  I would also like a full echocardiogram to determine the level of aortic valve disease and that will thereby dictate the frequency of follow-up.  All questions were answered.  We talked also about treating her PVCs with beta-blockers but this did not change her mortality or cardiovascular endpoint and therefore really it would be only up to her if she felt her symptoms were so bad that she wanted to take a medicine on a regular basis.  She could certainly do that at any point in time.  Does not warrant antiarrhythmic therapy other than beta-blockers.  We talked about trying to avoid other things such as caffeine stress  dehydration alcohol.    Today's clinic visit entailed:  Review of the result(s) of each unique test - ekg, stress echo, holter monitor  The following tests were independently interpreted by me as noted in my documentation: stress echocardiogram  Discussion of management or test interpretation with external physician/other qualified healthcare professional/appropriate source - no one  No LOS data to display   Time spent doing chart review, history and exam, documentation and further activities per the note  Provider  Link to [employer] Help Grid     The level of medical decision making during this visit was of moderate complexity.      Orders Placed This Encounter   Procedures     Follow-Up with Cardiology SAL     Echocardiogram Complete     No orders of the defined types were placed in this encounter.    There are no discontinued medications.      Encounter Diagnoses   Name Primary?     Abnormal Holter monitor finding      PVC's (premature ventricular contractions)      Palpitations      Nonspecific abnormal electrocardiogram (ECG) (EKG)        CURRENT MEDICATIONS:  Current Outpatient Medications   Medication Sig Dispense Refill     atorvastatin (LIPITOR) 20 MG tablet Take 1 tablet (20 mg) by mouth daily 90 tablet 1     calcium carbonate 500 mg, elemental, (OSCAL 500) 1250 (500 Ca) MG TABS tablet Take by mouth daily 500 daily twice daily       Calcium-Vitamin D-Vitamin K 500-1000-40 MG-UNT-MCG CHEW Take 2 chew tab by mouth 2 times daily (Gummie)       carboxymethylcellulose PF (REFRESH PLUS) 0.5 % SOLN ophthalmic solution Place 1-2 drops into both eyes At Bedtime       Coenzyme Q10-Vitamin E (QUNOL ULTRA COQ10 PO) Take 2 teaspoonful by mouth daily       cyanocobalamin (VITAMIN B-12) 1000 MCG tablet Take 1,000 mcg by mouth daily 2500 mcg day       fexofenadine (ALLEGRA) 180 MG tablet Take 1 tablet (180 mg) by mouth daily       fish oil-omega-3 fatty acids 1000 MG capsule Take 2 g by mouth daily 1600 mg daily        melatonin 1 MG TABS Take 2 mg by mouth At Bedtime        Multiple Vitamins-Minerals (PRESERVISION AREDS 2) CHEW        omeprazole (PRILOSEC) 20 MG DR capsule TAKE 1 CAPSULE BY MOUTH EVERY DAY 90 capsule 2     polyethylene glycol 0.4%- propylene glycol 0.3% (SYSTANE ULTRA) 0.4-0.3 % SOLN ophthalmic solution Place 1-2 drops into both eyes daily as needed for dry eyes       Probiotic Product (PROBIOTIC-10) CAPS Take 1 capsule by mouth daily       sucralfate (CARAFATE) 1 GM tablet Take 1 tablet (1 g) by mouth 4 times daily as needed for nausea (GERD) 40 tablet 1     UNABLE TO FIND MEDICATION NAME: calm 325 mg at night         ALLERGIES   No Known Allergies    PAST MEDICAL HISTORY:  Past Medical History:   Diagnosis Date     Allergy to cats     nasal symptoms     Arthritis 2015    x-ray     Family history of ischemic heart disease     parents and brother with CAD onset age 60s     Hidradenitis     recurrent, groin     Influenza with other manifestations 1972    hospitalized     Menopause 1993    age 43     Mixed hyperlipidemia      Osteopenia 2003 2014 FRAX: 12% risk major osteoporotic fx, 2.2% risk hip fx     Status post bunionectomy 05/2012    right with hammertoe repairs       PAST SURGICAL HISTORY:  Past Surgical History:   Procedure Laterality Date     BUNIONECTOMY Left 4/24/2019    Procedure: LEFT HALLIX VAGUS AND SECOND CLAWTOE RECONSTRUCTION;  Surgeon: Tyler Jameson MD;  Location:  OR     BUNIONECTOMY Ensign  5/14/2012    Procedure:BUNIONECTOMY KATIE; RIGHT FOOT EULA KATIE BUNIONECTOMY, HAMMER TOE RECONSTRUCTION DIGITS TWO, THREE, FOUR AND FIVE; Surgeon:DEANDRE MONTE; [facility]: [facility]     C DEXA INTERPRETATION, AXIAL  8/2003    T score femur -1.3, lumbar -1.0     C DEXA INTERPRETATION, AXIAL  8/2004    T score lumbar -1.2, femoral -1.4     C DEXA INTERPRETATION, AXIAL  11/2007    T score lumbar -1.5 (decline -2.8%/yr), femoral neck -1.3/-1.8 (decline -1.8%/yr)     COLONOSCOPY  1993     COLONOSCOPY   2000    normal, repeat 10 years     COLONOSCOPY  10/2010    normal, repeat 5 years     DEXA  10/2010    T score lumbar -1.2(stable), femoral neck -1.8/-2.2 with BMD 0.755 (6% worsening)     HC TOOTH EXTRACTION W/FORCEP      wisdom teeth extracted     REPAIR HAMMER TOE  2012    Procedure:REPAIR HAMMER TOE; Surgeon:DEANDRE MONTE; Location: [facility]     STRESS ECHO  10/2010    normal LV function at rest, hyperdynamic with exercise, EF 55-60% at rest and 70% post exercise     STRESS ECHO  10/2010    negative for echo/EKG changes of ischemia, normal LV fct with EF 55-60%, systolic fct hyperdynamic with exercise, exercised 7 min, EK.5 mm upsloping ST depression inf lead     STRESS ECHO (METRO)  2004    negative, exercise 8-9 min     ZZC DEXA INTERPRETATION, AXIAL  2014    T score L1-4 of -1.4 (dec 2.7%), femoral neck -1.9/-2.2 with BMD 0.728 (dec 0.8%)       FAMILY HISTORY:  Family History   Problem Relation Age of Onset     C.A.D. Father         MI age 61,   age 76 from MI     Diabetes Father      Cancer Father         esophagus, former smoker     Obesity Father      Coronary Artery Disease Father              Other Cancer Father      C.A.D. Mother         MI age 62     Lipids Mother      Blood Disease Mother         pernicious anemia     Thyroid Disease Mother      Eye Disorder Mother         macular degeneration     Osteoporosis Mother      Connective Tissue Disorder Mother         SLE - remission     Hypertension Mother      Hyperlipidemia Mother      Asthma Mother      Dementia Mother         memory loss, onset early 90s     Coronary Artery Disease Mother              C.A.D. Maternal Grandmother          early 80s     C.A.D. Maternal Grandfather          at younger age     C.A.D. Paternal Grandmother          late 70s     Hypertension Brother      Prostate Cancer Brother      Colon Polyps Brother         sister may also have colon polyps     Coronary Artery Disease  Brother         angioplasty     Asthma Brother      Obesity Brother         No longer obese     Arrhythmia Brother      Hyperlipidemia Sister      Osteoporosis Sister         she has osteopenia     Colon Polyps Sister         possible       SOCIAL HISTORY:  Social History     Socioeconomic History     Marital status:      Spouse name: Armond     Number of children: 0     Years of education: 16     Highest education level: None   Occupational History     Occupation: [employer] office     Employer: [employer]   Tobacco Use     Smoking status: Never Smoker     Smokeless tobacco: Never Used   Vaping Use     Vaping Use: Never used   Substance and Sexual Activity     Alcohol use: Not Currently     Comment: Wine occasionally     Drug use: No     Sexual activity: Not Currently     Partners: Male     Birth control/protection: None     Comment: same partner since 1973   Other Topics Concern      Service No     Blood Transfusions No     Caffeine Concern No     Occupational Exposure No     Hobby Hazards No     Sleep Concern Yes     Comment:  snores, occ. sleep disturbances     Stress Concern Yes     Comment: moderate     Weight Concern No     Special Diet Yes     Comment: ( diabetic)- low carb//soy milk     Back Care No     Exercise Yes     Comment: walking/aerobic 60 min 2 days per week, yoga one hour per week     Bike Helmet No     Seat Belt Yes     Self-Exams No     Parent/sibling w/ CABG, MI or angioplasty before 65F 55M? Yes     Comment: Mother   Social History Narrative     Lives with  who is diabetic.       Review of Systems:  Skin:  Negative     Eyes:  Positive for glasses  ENT:  Negative    Respiratory:  Negative    Cardiovascular:  Negative    Gastroenterology: Negative    Genitourinary:  Negative    Musculoskeletal:  Positive for arthritis  Neurologic:  Negative    Psychiatric:  Negative    Heme/Lymph/Imm:  Negative    Endocrine:  Negative      Physical Exam:  Vitals: BP (!)  "178/84   Pulse 78   Ht 1.549 m (5' 1\")   Wt 56.4 kg (124 lb 6.4 oz)   BMI 23.51 kg/m      Constitutional:           Skin:             Head:           Eyes:           Lymph:      ENT:           Neck:           Respiratory:            Cardiac:                                                           GI:           Extremities and Muscular Skeletal:                 Neurological:           Psych:         Recent Lab Results:  LIPID RESULTS:  Lab Results   Component Value Date    CHOL 166 02/15/2022    CHOL 246 (H) 01/14/2021    HDL 72 02/15/2022    HDL 72 01/14/2021    LDL 83 02/15/2022     (H) 01/14/2021    TRIG 56 02/15/2022    TRIG 97 01/14/2021    CHOLHDLRATIO 3.5 06/25/2015       LIVER ENZYME RESULTS:  Lab Results   Component Value Date    AST 20 01/27/2022    AST 16 01/14/2021    ALT 34 01/27/2022    ALT 28 01/14/2021       CBC RESULTS:  Lab Results   Component Value Date    WBC 5.8 01/27/2022    WBC 5.0 12/07/2017    RBC 3.96 01/27/2022    RBC 4.25 12/07/2017    HGB 12.1 01/27/2022    HGB 13.0 11/20/2018    HCT 35.9 01/27/2022    HCT 39.0 12/07/2017    MCV 91 01/27/2022    MCV 92 12/07/2017    MCH 30.6 01/27/2022    MCH 31.3 12/07/2017    MCHC 33.7 01/27/2022    MCHC 34.1 12/07/2017    RDW 12.7 01/27/2022    RDW 13.8 12/07/2017     01/27/2022     12/07/2017       BMP RESULTS:  Lab Results   Component Value Date     01/27/2022     01/14/2021    POTASSIUM 3.9 01/27/2022    POTASSIUM 3.9 01/14/2021    CHLORIDE 105 01/27/2022    CHLORIDE 106 01/14/2021    CO2 27 01/27/2022    CO2 27 01/14/2021    ANIONGAP 5 01/27/2022    ANIONGAP 5 01/14/2021    GLC 91 01/27/2022    GLC 99 01/14/2021    BUN 11 01/27/2022    BUN 16 01/14/2021    CR 0.71 01/27/2022    CR 0.75 01/14/2021    GFRESTIMATED 89 01/27/2022    GFRESTIMATED 79 01/14/2021    GFRESTBLACK >90 01/14/2021    KARLA 9.0 01/27/2022    KARLA 9.3 01/14/2021        A1C RESULTS:  No results found for: A1C    INR RESULTS:  No results found " for: INR    CC  Lori Escamilla MD  [address] [place] [address]    Thank you for allowing me to participate in the care of your patient.      Sincerely,   ARYAN BOND MD   [facility]

## 2022-06-07 NOTE — PROGRESS NOTES
HPI and Plan:     Dorie Gagnon is a pleasant 72-year-old comes into clinic today with her .  She is referred here by her primary physician for PVCs.  She has a history of hyperlipidemia.  She has no chest pain chest pressure shortness of breath.  She walks but does no other regular exercise.  She has noticed increased heart palpitations and she actually caused some PVCs.  A Holter monitor 48-hour done recently shows less than 1% PVC burden with 65 PVCs in a 48-hour period.  She also had minimal SVT.  There was no runs of ventricular tachycardia.  Earlier this year in February she had a stress echocardiogram which I personally reviewed.  Normal LV size and function.  The reader states at least mild aortic stenosis but I do not see any gradient determined.  There was also mild aortic insufficiency.  There was no EKG changes.  She has had no syncope near syncope PND orthopnea or pedal edema.    She is quite hypertensive today and have asked her to do a log of her blood pressures at home over the next couple of weeks.  She should bring that back with her when she sees my SAL.  I would also like a full echocardiogram to determine the level of aortic valve disease and that will thereby dictate the frequency of follow-up.  All questions were answered.  We talked also about treating her PVCs with beta-blockers but this did not change her mortality or cardiovascular endpoint and therefore really it would be only up to her if she felt her symptoms were so bad that she wanted to take a medicine on a regular basis.  She could certainly do that at any point in time.  Does not warrant antiarrhythmic therapy other than beta-blockers.  We talked about trying to avoid other things such as caffeine stress dehydration alcohol.    Today's clinic visit entailed:  Review of the result(s) of each unique test - ekg, stress echo, holter monitor  The following tests were independently interpreted by me as noted in my documentation:  stress echocardiogram  Discussion of management or test interpretation with external physician/other qualified healthcare professional/appropriate source - no one  No LOS data to display   Time spent doing chart review, history and exam, documentation and further activities per the note  Provider  Link to MDM Help Grid     The level of medical decision making during this visit was of moderate complexity.      Orders Placed This Encounter   Procedures     Follow-Up with Cardiology SAL     Echocardiogram Complete     No orders of the defined types were placed in this encounter.    There are no discontinued medications.      Encounter Diagnoses   Name Primary?     Abnormal Holter monitor finding      PVC's (premature ventricular contractions)      Palpitations      Nonspecific abnormal electrocardiogram (ECG) (EKG)        CURRENT MEDICATIONS:  Current Outpatient Medications   Medication Sig Dispense Refill     atorvastatin (LIPITOR) 20 MG tablet Take 1 tablet (20 mg) by mouth daily 90 tablet 1     calcium carbonate 500 mg, elemental, (OSCAL 500) 1250 (500 Ca) MG TABS tablet Take by mouth daily 500 daily twice daily       Calcium-Vitamin D-Vitamin K 500-1000-40 MG-UNT-MCG CHEW Take 2 chew tab by mouth 2 times daily (Gummie)       carboxymethylcellulose PF (REFRESH PLUS) 0.5 % SOLN ophthalmic solution Place 1-2 drops into both eyes At Bedtime       Coenzyme Q10-Vitamin E (QUNOL ULTRA COQ10 PO) Take 2 teaspoonful by mouth daily       cyanocobalamin (VITAMIN B-12) 1000 MCG tablet Take 1,000 mcg by mouth daily 2500 mcg day       fexofenadine (ALLEGRA) 180 MG tablet Take 1 tablet (180 mg) by mouth daily       fish oil-omega-3 fatty acids 1000 MG capsule Take 2 g by mouth daily 1600 mg daily       melatonin 1 MG TABS Take 2 mg by mouth At Bedtime        Multiple Vitamins-Minerals (PRESERVISION AREDS 2) CHEW        omeprazole (PRILOSEC) 20 MG DR capsule TAKE 1 CAPSULE BY MOUTH EVERY DAY 90 capsule 2     polyethylene glycol  0.4%- propylene glycol 0.3% (SYSTANE ULTRA) 0.4-0.3 % SOLN ophthalmic solution Place 1-2 drops into both eyes daily as needed for dry eyes       Probiotic Product (PROBIOTIC-10) CAPS Take 1 capsule by mouth daily       sucralfate (CARAFATE) 1 GM tablet Take 1 tablet (1 g) by mouth 4 times daily as needed for nausea (GERD) 40 tablet 1     UNABLE TO FIND MEDICATION NAME: calm 325 mg at night         ALLERGIES   No Known Allergies    PAST MEDICAL HISTORY:  Past Medical History:   Diagnosis Date     Allergy to cats     nasal symptoms     Arthritis 2015    x-ray     Family history of ischemic heart disease     parents and brother with CAD onset age 60s     Hidradenitis     recurrent, groin     Influenza with other manifestations 1972    hospitalized     Menopause 1993    age 43     Mixed hyperlipidemia      Osteopenia 2003 2014 FRAX: 12% risk major osteoporotic fx, 2.2% risk hip fx     Status post bunionectomy 05/2012    right with hammertoe repairs       PAST SURGICAL HISTORY:  Past Surgical History:   Procedure Laterality Date     BUNIONECTOMY Left 4/24/2019    Procedure: LEFT HALLIX VAGUS AND SECOND CLAWTOE RECONSTRUCTION;  Surgeon: Tyler Jameson MD;  Location:  OR     BUNIONECTOMY KATIE  5/14/2012    Procedure:BUNIONECTOMY KATIE; RIGHT FOOT EULA KATIE BUNIONECTOMY, HAMMER TOE RECONSTRUCTION DIGITS TWO, THREE, FOUR AND FIVE; Surgeon:DEANDRE MONTE; Location: [facility]     C DEXA INTERPRETATION, AXIAL  8/2003    T score femur -1.3, lumbar -1.0     C DEXA INTERPRETATION, AXIAL  8/2004    T score lumbar -1.2, femoral -1.4     C DEXA INTERPRETATION, AXIAL  11/2007    T score lumbar -1.5 (decline -2.8%/yr), femoral neck -1.3/-1.8 (decline -1.8%/yr)     COLONOSCOPY  1993     COLONOSCOPY  9/2000    normal, repeat 10 years     COLONOSCOPY  10/2010    normal, repeat 5 years     DEXA  10/2010    T score lumbar -1.2(stable), femoral neck -1.8/-2.2 with BMD 0.755 (6% worsening)     HC TOOTH EXTRACTION W/FORCEP  1973     wisdom teeth extracted     REPAIR HAMMER TOE  2012    Procedure:REPAIR HAMMER TOE; Surgeon:DEANDRE MONTE; Location:[facility]     STRESS ECHO  10/2010    normal LV function at rest, hyperdynamic with exercise, EF 55-60% at rest and 70% post exercise     STRESS ECHO  10/2010    negative for echo/EKG changes of ischemia, normal LV fct with EF 55-60%, systolic fct hyperdynamic with exercise, exercised 7 min, EK.5 mm upsloping ST depression inf lead     STRESS ECHO (METRO)  2004    negative, exercise 8-9 min     Z DEXA INTERPRETATION, AXIAL  2014    T score L1-4 of -1.4 (dec 2.7%), femoral neck -1.9/-2.2 with BMD 0.728 (dec 0.8%)       FAMILY HISTORY:  Family History   Problem Relation Age of Onset     C.A.D. Father         MI age 61,   age 76 from MI     Diabetes Father      Cancer Father         esophagus, former smoker     Obesity Father      Coronary Artery Disease Father              Other Cancer Father      C.A.D. Mother         MI age 62     Lipids Mother      Blood Disease Mother         pernicious anemia     Thyroid Disease Mother      Eye Disorder Mother         macular degeneration     Osteoporosis Mother      Connective Tissue Disorder Mother         SLE - remission     Hypertension Mother      Hyperlipidemia Mother      Asthma Mother      Dementia Mother         memory loss, onset early 90s     Coronary Artery Disease Mother              C.A.D. Maternal Grandmother          early 80s     C.A.D. Maternal Grandfather          at younger age     C.A.D. Paternal Grandmother          late 70s     Hypertension Brother      Prostate Cancer Brother      Colon Polyps Brother         sister may also have colon polyps     Coronary Artery Disease Brother         angioplasty     Asthma Brother      Obesity Brother         No longer obese     Arrhythmia Brother      Hyperlipidemia Sister      Osteoporosis Sister         she has osteopenia     Colon Polyps Sister          "possible       SOCIAL HISTORY:  Social History     Socioeconomic History     Marital status:      Spouse name: Armond     Number of children: 0     Years of education: 16     Highest education level: None   Occupational History     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 sleep disturbances     Stress Concern Yes     Comment: moderate     Weight Concern No     Special Diet Yes     Comment: ( diabetic)- low carb//soy milk     Back Care No     Exercise Yes     Comment: walking/aerobic 60 min 2 days per week, yoga one hour per week     Bike Helmet No     Seat Belt Yes     Self-Exams No     Parent/sibling w/ CABG, MI or angioplasty before 65F 55M? Yes     Comment: Mother   Social History Narrative     Lives with  who is diabetic.       Review of Systems:  Skin:  Negative     Eyes:  Positive for glasses  ENT:  Negative    Respiratory:  Negative    Cardiovascular:  Negative    Gastroenterology: Negative    Genitourinary:  Negative    Musculoskeletal:  Positive for arthritis  Neurologic:  Negative    Psychiatric:  Negative    Heme/Lymph/Imm:  Negative    Endocrine:  Negative      Physical Exam:  Vitals: BP (!) 178/84   Pulse 78   Ht 1.549 m (5' 1\")   Wt 56.4 kg (124 lb 6.4 oz)   BMI 23.51 kg/m      Constitutional:           Skin:             Head:           Eyes:           Lymph:      ENT:           Neck:           Respiratory:       "      Cardiac:                                                           GI:           Extremities and Muscular Skeletal:                 Neurological:           Psych:         Recent Lab Results:  LIPID RESULTS:  Lab Results   Component Value Date    CHOL 166 02/15/2022    CHOL 246 (H) 01/14/2021    HDL 72 02/15/2022    HDL 72 01/14/2021    LDL 83 02/15/2022     (H) 01/14/2021    TRIG 56 02/15/2022    TRIG 97 01/14/2021    CHOLHDLRATIO 3.5 06/25/2015       LIVER ENZYME RESULTS:  Lab Results   Component Value Date    AST 20 01/27/2022    AST 16 01/14/2021    ALT 34 01/27/2022    ALT 28 01/14/2021       CBC RESULTS:  Lab Results   Component Value Date    WBC 5.8 01/27/2022    WBC 5.0 12/07/2017    RBC 3.96 01/27/2022    RBC 4.25 12/07/2017    HGB 12.1 01/27/2022    HGB 13.0 11/20/2018    HCT 35.9 01/27/2022    HCT 39.0 12/07/2017    MCV 91 01/27/2022    MCV 92 12/07/2017    MCH 30.6 01/27/2022    MCH 31.3 12/07/2017    MCHC 33.7 01/27/2022    MCHC 34.1 12/07/2017    RDW 12.7 01/27/2022    RDW 13.8 12/07/2017     01/27/2022     12/07/2017       BMP RESULTS:  Lab Results   Component Value Date     01/27/2022     01/14/2021    POTASSIUM 3.9 01/27/2022    POTASSIUM 3.9 01/14/2021    CHLORIDE 105 01/27/2022    CHLORIDE 106 01/14/2021    CO2 27 01/27/2022    CO2 27 01/14/2021    ANIONGAP 5 01/27/2022    ANIONGAP 5 01/14/2021    GLC 91 01/27/2022    GLC 99 01/14/2021    BUN 11 01/27/2022    BUN 16 01/14/2021    CR 0.71 01/27/2022    CR 0.75 01/14/2021    GFRESTIMATED 89 01/27/2022    GFRESTIMATED 79 01/14/2021    GFRESTBLACK >90 01/14/2021    KARLA 9.0 01/27/2022    KARLA 9.3 01/14/2021        A1C RESULTS:  No results found for: A1C    INR RESULTS:  No results found for: INR        CC  Lori Escamilla MD  [address] [place] [address]

## 2022-07-18 ENCOUNTER — HOSPITAL ENCOUNTER (OUTPATIENT)
Dept: CARDIOLOGY | Facility: CLINIC | Age: 72
Discharge: HOME | End: 2022-07-18
Attending: INTERNAL MEDICINE | Admitting: INTERNAL MEDICINE
Payer: COMMERCIAL

## 2022-07-18 DIAGNOSIS — R94.31 NONSPECIFIC ABNORMAL ELECTROCARDIOGRAM (ECG) (EKG): ICD-10-CM

## 2022-07-18 DIAGNOSIS — R94.31 ABNORMAL HOLTER MONITOR FINDING: ICD-10-CM

## 2022-07-18 DIAGNOSIS — R00.2 PALPITATIONS: ICD-10-CM

## 2022-07-18 DIAGNOSIS — I49.3 PVC'S (PREMATURE VENTRICULAR CONTRACTIONS): ICD-10-CM

## 2022-07-18 LAB — LVEF ECHO: NORMAL

## 2022-07-18 PROCEDURE — 93306 TTE W/DOPPLER COMPLETE: CPT | Mod: 26 | Performed by: INTERNAL MEDICINE

## 2022-07-18 PROCEDURE — 93306 TTE W/DOPPLER COMPLETE: CPT

## 2022-07-18 NOTE — RESULT ENCOUNTER NOTE
Will be reviewed by JENIFER Cruz with patient at their 7/21 follow up/OV.     Rose Marie Gonzales RN  Structural Heart Coordinator  [facility]

## 2022-07-21 ENCOUNTER — OFFICE VISIT (OUTPATIENT)
Dept: CARDIOLOGY | Facility: CLINIC | Age: 72
End: 2022-07-21
Attending: INTERNAL MEDICINE
Payer: COMMERCIAL

## 2022-07-21 VITALS
HEART RATE: 60 BPM | HEIGHT: 61 IN | OXYGEN SATURATION: 99 % | WEIGHT: 125 LBS | BODY MASS INDEX: 23.6 KG/M2 | DIASTOLIC BLOOD PRESSURE: 83 MMHG | SYSTOLIC BLOOD PRESSURE: 147 MMHG

## 2022-07-21 DIAGNOSIS — R94.31 ABNORMAL HOLTER MONITOR FINDING: ICD-10-CM

## 2022-07-21 DIAGNOSIS — R00.2 PALPITATIONS: ICD-10-CM

## 2022-07-21 DIAGNOSIS — I49.3 PVC'S (PREMATURE VENTRICULAR CONTRACTIONS): ICD-10-CM

## 2022-07-21 DIAGNOSIS — R94.31 NONSPECIFIC ABNORMAL ELECTROCARDIOGRAM (ECG) (EKG): ICD-10-CM

## 2022-07-21 PROCEDURE — 99214 OFFICE O/P EST MOD 30 MIN: CPT | Performed by: PHYSICIAN ASSISTANT

## 2022-07-21 NOTE — LETTER
7/21/2022    Lori Escamilla MD  [address]  [place] [address]    RE: Dorie Gagnon       Dear Colleague,     I had the pleasure of seeing Dorie Gagnon in the [facility].  Primary Cardiologist: Dr. Bragg    Reason for Visit: Review Echo    History of Present Illness:   Dorie is 72 year old female who was referred to Dr. Bragg for PVCs.  Her 48-hour Holter monitor showed only 1% PVC burden.  There was also some questionable aortic valve disease based on her previous stress echocardiogram study.  She was set up for full resting echocardiogram for more extensive structural evaluation.  She was also recommended to check her blood pressure daily as it was moderately hypertensive during her visit with Dr. Bragg.    Dorie presents to clinic today with her spouse, stating she is doing well.  She denies any significant symptoms of chest discomfort, palpitations, orthopnea, PND,  headache, or vision changes.    Assessment and Plan:  Dorie is a 72-year-old female with PVCs and whitecoat hypertension.    We reviewed her recent echocardiogram study which showed aortic valve sclerosis but no evidence of stenosis.  Her LVEF was preserved.  She had no other structural abnormalities.  Her blood pressure log showed that her average systolic is in the 120s.  I informed her that we do not need to make any medication changes at this time.  She will see us in cardiology clinic as needed.      This note was completed in part using Dragon voice recognition software. Although reviewed after completion, some word and grammatical errors may occur.    Orders this Visit:  No orders of the defined types were placed in this encounter.    No orders of the defined types were placed in this encounter.    There are no discontinued medications.      Encounter Diagnoses   Name Primary?     Abnormal Holter monitor finding      PVC's (premature ventricular contractions)      Palpitations      Nonspecific  abnormal electrocardiogram (ECG) (EKG)        CURRENT MEDICATIONS:  Current Outpatient Medications   Medication Sig Dispense Refill     atorvastatin (LIPITOR) 20 MG tablet Take 1 tablet (20 mg) by mouth daily 90 tablet 1     calcium carbonate 500 mg, elemental, (OSCAL 500) 1250 (500 Ca) MG TABS tablet Take by mouth daily 500 daily twice daily       Calcium-Vitamin D-Vitamin K 500-1000-40 MG-UNT-MCG CHEW Take 2 chew tab by mouth 2 times daily (Gummie)       carboxymethylcellulose PF (REFRESH PLUS) 0.5 % SOLN ophthalmic solution Place 1-2 drops into both eyes At Bedtime       Coenzyme Q10-Vitamin E (QUNOL ULTRA COQ10 PO) Take 2 teaspoonful by mouth daily       cyanocobalamin (VITAMIN B-12) 1000 MCG tablet Take 1,000 mcg by mouth daily 2500 mcg day       fexofenadine (ALLEGRA) 180 MG tablet Take 1 tablet (180 mg) by mouth daily       fish oil-omega-3 fatty acids 1000 MG capsule Take 2 g by mouth daily 1600 mg daily       melatonin 1 MG TABS Take 2 mg by mouth At Bedtime        Multiple Vitamins-Minerals (PRESERVISION AREDS 2) CHEW        omeprazole (PRILOSEC) 20 MG DR capsule TAKE 1 CAPSULE BY MOUTH EVERY DAY 90 capsule 2     polyethylene glycol 0.4%- propylene glycol 0.3% (SYSTANE ULTRA) 0.4-0.3 % SOLN ophthalmic solution Place 1-2 drops into both eyes daily as needed for dry eyes       Probiotic Product (PROBIOTIC-10) CAPS Take 1 capsule by mouth daily       sucralfate (CARAFATE) 1 GM tablet Take 1 tablet (1 g) by mouth 4 times daily as needed for nausea (GERD) 40 tablet 1     UNABLE TO FIND MEDICATION NAME: calm 325 mg at night         ALLERGIES   No Known Allergies    PAST MEDICAL HISTORY:  Past Medical History:   Diagnosis Date     Allergy to cats     nasal symptoms     Arthritis 2015    x-ray     Family history of ischemic heart disease     parents and brother with CAD onset age 60s     Hidradenitis     recurrent, groin     Influenza with other manifestations 1972    hospitalized     Menopause 1993    age 43      Mixed hyperlipidemia      Osteopenia 2003 FRAX: 12% risk major osteoporotic fx, 2.2% risk hip fx     Status post bunionectomy 2012    right with hammertoe repairs       PAST SURGICAL HISTORY:  Past Surgical History:   Procedure Laterality Date     BUNIONECTOMY Left 2019    Procedure: LEFT HALLIX VAGUS AND SECOND CLAWTOE RECONSTRUCTION;  Surgeon: Tyler Jameson MD;  Location:  OR     BUNIONECTOMY KATIE  2012    Procedure:BUNIONECTOMY KATIE; RIGHT FOOT EULA KATIE BUNIONECTOMY, HAMMER TOE RECONSTRUCTION DIGITS TWO, THREE, FOUR AND FIVE; Surgeon:DEANDRE MONTE; Location: [facility]     C DEXA INTERPRETATION, AXIAL  2003    T score femur -1.3, lumbar -1.0     C DEXA INTERPRETATION, AXIAL  2004    T score lumbar -1.2, femoral -1.4     C DEXA INTERPRETATION, AXIAL  2007    T score lumbar -1.5 (decline -2.8%/yr), femoral neck -1.3/-1.8 (decline -1.8%/yr)     COLONOSCOPY       COLONOSCOPY  2000    normal, repeat 10 years     COLONOSCOPY  10/2010    normal, repeat 5 years     DEXA  10/2010    T score lumbar -1.2(stable), femoral neck -1.8/-2.2 with BMD 0.755 (6% worsening)     HC TOOTH EXTRACTION W/FORCEP      wisdom teeth extracted     REPAIR HAMMER TOE  2012    Procedure:REPAIR HAMMER TOE; Surgeon:DEANDRE MONTE; Location: [facility]     STRESS ECHO  10/2010    normal LV function at rest, hyperdynamic with exercise, EF 55-60% at rest and 70% post exercise     STRESS ECHO  10/2010    negative for echo/EKG changes of ischemia, normal LV fct with EF 55-60%, systolic fct hyperdynamic with exercise, exercised 7 min, EK.5 mm upsloping ST depression inf lead     STRESS ECHO (METRO)  2004    negative, exercise 8-9 min     [facility] DEXA INTERPRETATION, AXIAL  2014    T score L1-4 of -1.4 (dec 2.7%), femoral neck -1.9/-2.2 with BMD 0.728 (dec 0.8%)       FAMILY HISTORY:  Family History   Problem Relation Age of Onset     C.A.D. Father         MI age 61,   age 76 from MI      Diabetes Father      Cancer Father         esophagus, former smoker     Obesity Father      Coronary Artery Disease Father              Other Cancer Father      C.A.D. Mother         MI age 62     Lipids Mother      Blood Disease Mother         pernicious anemia     Thyroid Disease Mother      Eye Disorder Mother         macular degeneration     Osteoporosis Mother      Connective Tissue Disorder Mother         SLE - remission     Hypertension Mother      Hyperlipidemia Mother      Asthma Mother      Dementia Mother         memory loss, onset early 90s     Coronary Artery Disease Mother              C.A.D. Maternal Grandmother          early 80s     C.A.D. Maternal Grandfather          at younger age     C.A.D. Paternal Grandmother          late 70s     Hypertension Brother      Prostate Cancer Brother      Colon Polyps Brother         sister may also have colon polyps     Coronary Artery Disease Brother         angioplasty     Asthma Brother      Obesity Brother         No longer obese     Arrhythmia Brother      Hyperlipidemia Sister      Osteoporosis Sister         she has osteopenia     Colon Polyps Sister         possible       SOCIAL HISTORY:  Social History     Socioeconomic History     Marital status:      Spouse name: Armond     Number of children: 0     Years of education: 16     Highest education level: None   Occupational History     Occupation: [employer] office     Employer: [employer]   Tobacco Use     Smoking status: Never Smoker     Smokeless tobacco: Never Used   Vaping Use     Vaping Use: Never used   Substance and Sexual Activity     Alcohol use: Not Currently     Comment: Wine occasionally     Drug use: No     Sexual activity: Not Currently     Partners: Male     Birth control/protection: None     Comment: same partner since    Other Topics Concern      Service No     Blood Transfusions No     Caffeine Concern No     Occupational Exposure No      "Hobby Hazards No     Sleep Concern Yes     Comment:  snriana, occ. sleep disturbances     Stress Concern Yes     Comment: moderate     Weight Concern No     Special Diet Yes     Comment: ( diabetic)- low carb//soy milk     Back Care No     Exercise Yes     Comment: walking/aerobic 60 min 2 days per week, yoga one hour per week     Bike Helmet No     Seat Belt Yes     Self-Exams No     Parent/sibling w/ CABG, MI or angioplasty before 65F 55M? Yes     Comment: Mother   Social History Narrative     Lives with  who is diabetic.       Review of Systems:  Skin:  Negative     Eyes:  Positive for glasses  ENT:  Negative    Respiratory:  Negative    Cardiovascular:    Positive for;palpitations  Gastroenterology: Negative    Genitourinary:  Negative    Musculoskeletal:  Positive for arthritis  Neurologic:  Negative    Psychiatric:  Negative    Heme/Lymph/Imm:  Negative    Endocrine:  Negative      Physical Exam:  Vitals: BP (!) 147/83   Pulse 60   Ht 1.549 m (5' 1\")   Wt 56.7 kg (125 lb)   SpO2 99%   BMI 23.62 kg/m       GEN:  NAD  NECK: No JVD  C/V:  Regular rate and rhythm, no murmur, rub or gallop.  RESP: Clear to auscultation bilaterally without wheezing, rales, or rhonchi.  GI: Abdomen soft, nontender, nondistended.   EXTREM: No pitting LE edema.   NEURO: Alert and oriented, cooperative. No obvious focal deficits.   PSYCH: Normal affect.  SKIN: Warm and dry.       Recent Lab Results:  LIPID RESULTS:  Lab Results   Component Value Date    CHOL 166 02/15/2022    CHOL 246 (H) 01/14/2021    HDL 72 02/15/2022    HDL 72 01/14/2021    LDL 83 02/15/2022     (H) 01/14/2021    TRIG 56 02/15/2022    TRIG 97 01/14/2021    CHOLHDLRATIO 3.5 06/25/2015       LIVER ENZYME RESULTS:  Lab Results   Component Value Date    AST 20 01/27/2022    AST 16 01/14/2021    ALT 34 01/27/2022    ALT 28 01/14/2021       CBC RESULTS:  Lab Results   Component Value Date    WBC 5.8 01/27/2022    WBC 5.0 12/07/2017    RBC " 3.96 01/27/2022    RBC 4.25 12/07/2017    HGB 12.1 01/27/2022    HGB 13.0 11/20/2018    HCT 35.9 01/27/2022    HCT 39.0 12/07/2017    MCV 91 01/27/2022    MCV 92 12/07/2017    MCH 30.6 01/27/2022    MCH 31.3 12/07/2017    MCHC 33.7 01/27/2022    MCHC 34.1 12/07/2017    RDW 12.7 01/27/2022    RDW 13.8 12/07/2017     01/27/2022     12/07/2017       BMP RESULTS:  Lab Results   Component Value Date     01/27/2022     01/14/2021    POTASSIUM 3.9 01/27/2022    POTASSIUM 3.9 01/14/2021    CHLORIDE 105 01/27/2022    CHLORIDE 106 01/14/2021    CO2 27 01/27/2022    CO2 27 01/14/2021    ANIONGAP 5 01/27/2022    ANIONGAP 5 01/14/2021    GLC 91 01/27/2022    GLC 99 01/14/2021    BUN 11 01/27/2022    BUN 16 01/14/2021    CR 0.71 01/27/2022    CR 0.75 01/14/2021    GFRESTIMATED 89 01/27/2022    GFRESTIMATED 79 01/14/2021    GFRESTBLACK >90 01/14/2021    KARLA 9.0 01/27/2022    KARLA 9.3 01/14/2021        A1C RESULTS:  No results found for: A1C    INR RESULTS:  No results found for: INR        Nancy Dumont PA-C  July 21, 2022     Thank you for allowing me to participate in the care of your patient.      Sincerely,     Nancy Dumont PA-C     [facility]  cc:   Massimo Bragg MD  [address]  [place] [phone]

## 2022-08-15 DIAGNOSIS — E78.5 HYPERLIPIDEMIA LDL GOAL <130: ICD-10-CM

## 2022-08-17 RX ORDER — ATORVASTATIN CALCIUM 20 MG/1
20 TABLET, FILM COATED ORAL DAILY
Qty: 90 TABLET | Refills: 1 | Status: SHIPPED | OUTPATIENT
Start: 2022-08-17 | End: 2023-05-12

## 2022-08-17 NOTE — TELEPHONE ENCOUNTER
Prescription approved per [facility] Refill Protocol.    Magdalena Silvestre RN  [facility] Triage Team

## 2022-09-16 ENCOUNTER — NURSE TRIAGE (OUTPATIENT)
Dept: FAMILY MEDICINE | Facility: CLINIC | Age: 72
End: 2022-09-16

## 2022-09-16 NOTE — TELEPHONE ENCOUNTER
Rib and shoulder pain after a fall on 9/3 aleve helped with pain but still painful with coughing on side and shoulder    Patient scheduled per disposition with micelle    Jovany Altamirano RN      Reason for Disposition    Chest Injury from a direct blow or fall    [1] Chest wall swelling or pain AND [2] present > 7 days    Additional Information    Negative: [1] Injuries at more than 1 site AND [2] unsure which guideline to use    Negative: Chest pain not from an injury OR cause is unknown    Negative: Wound looks infected    Negative: SEVERE chest pain    Negative: [1] Difficulty breathing AND [2] not severe    Negative: Skin is split open or gaping    Negative: [1] Bleeding AND [2] won't stop after 10 minutes of direct pressure (using correct technique)    Negative: Sounds like a serious injury to the triager    Negative: [1] Can't take a deep breath BUT [2] no respiratory distress    Negative: Shallow puncture wound    Negative: [1] Last tetanus shot > 10 years ago AND [2] CLEAN cut or scrape (e.g., object AND skin were clean)    Negative: [1] Last tetanus shot > 5 years ago AND [2] DIRTY cut or scrape    Negative: [1] High-risk adult (e.g., age > 60 years, osteoporosis, chronic steroid use) AND [2] still hurts    Negative: [1] No prior tetanus shots (or is not fully vaccinated) AND [2] any wound (e.g., cut, scrape)    Negative: [1] HIV positive or severe immunodeficiency (severely weak immune system) AND [2] DIRTY cut or scrape    Negative: [1] Collarbone is painful AND [2] difficulty raising arm    Negative: Suspicious history for the injury    Negative: Patient is confused or is an unreliable provider of information (e.g., dementia, severe intellectual disability, alcohol intoxication)    Negative: Major injury from dangerous force or speed (e.g., MVA, fall > 10 feet or 3 meters)    Negative: Bullet wound, knife wound, or other penetrating object    Negative: Puncture wound that sounds life-threatening to the  triager    Negative: SEVERE difficulty breathing (e.g., struggling for each breath, speaks in single words)    Negative: [1] Major bleeding (e.g., actively dripping or spurting) AND [2] can't be stopped    Negative: Open wound of the chest with sound of moving air (sucking wound) or visible air bubbles    Negative: Shock suspected (e.g., cold/pale/clammy skin, too weak to stand, low BP, rapid pulse)    Negative: Coughing or spitting up blood    Negative: Bluish (or gray) lips or face now    Negative: Unconscious or was unconscious    Negative: Sounds like a life-threatening emergency to the triager    Negative: [1] After 72 hours AND [2] chest pain not improving    Protocols used: CHEST INJURY - BENDING, LIFTING, OR [id number], CHEST INJURY-A-AH

## 2022-09-19 ENCOUNTER — OFFICE VISIT (OUTPATIENT)
Dept: INTERNAL MEDICINE | Facility: CLINIC | Age: 72
End: 2022-09-19
Payer: COMMERCIAL

## 2022-09-19 VITALS
BODY MASS INDEX: 22.48 KG/M2 | TEMPERATURE: 97.2 F | SYSTOLIC BLOOD PRESSURE: 131 MMHG | RESPIRATION RATE: 16 BRPM | WEIGHT: 119 LBS | OXYGEN SATURATION: 97 % | DIASTOLIC BLOOD PRESSURE: 78 MMHG

## 2022-09-19 DIAGNOSIS — R19.7 DIARRHEA, UNSPECIFIED TYPE: ICD-10-CM

## 2022-09-19 DIAGNOSIS — R07.81 RIB PAIN ON LEFT SIDE: Primary | ICD-10-CM

## 2022-09-19 PROCEDURE — 99213 OFFICE O/P EST LOW 20 MIN: CPT | Performed by: PHYSICIAN ASSISTANT

## 2022-09-19 NOTE — PROGRESS NOTES
Assessment & Plan     Rib pain on left side    - XR Ribs & Chest Left G/E 3 Views; Future    Diarrhea, unspecified type  X 12 hours                Will notify of xray report when available  Regarding diarrhea  Recommend simple diet push fluids peptobismal ok. - reviewed dark stools can happen  If diarrhea not improving or worsening symptoms in the 4-5 days then contact PCP - consider lab/stool studies     Return in about 4 weeks (around 10/17/2022) for recheck if not improving, regular primary provider.    JENIFER [facility] ELENO Oshea is a 72 year old accompanied by her spouse, presenting for the following health issues:  Fall (9-3-22    LEFT side &  shoulder pain )      History of Present Illness       Reason for visit:  Fall 9/3  Symptom onset:  3-4 weeks ago  Symptoms include:  Aches and pain on left side  Symptom intensity:  Moderate  Symptom progression:  Improving  Had these symptoms before:  No  What makes it worse:  If I cough  What makes it better:  If I do not cough    She eats 2-3 servings of fruits and vegetables daily.She consumes 0 sweetened beverage(s) daily.She exercises with enough effort to increase her heart rate 30 to 60 minutes per day.  She exercises with enough effort to increase her heart rate 4 days per week. She is missing 1 dose(s) of medications per week.         Diarrhea      Duration: last night after supper    Take out pasta     Description:       Consistency of stool: runny       Blood in stool: no        Number of loose stools past 24 hours: 7-8   None since taking peptobismal this am     Intensity:  moderate    Accompanying signs and symptoms:       Fever: no        Nausea/vomitting: no        Abdominal pain: YES- cramping with BM       Weight loss: no     History (recent antibiotics or travel/ill contacts/med changes/testing done):     No ill contacts known     Precipitating or alleviating factors:  None    Therapies tried and outcome: PeptoBismol   Just started last night  Improved this afternoon after 3 doses of peptobismal this am     Review of Systems   Constitutional, HEENT, cardiovascular, pulmonary, gi and gu systems are negative, except as otherwise noted.      Objective    /78   Temp 97.2  F (36.2  C)   Resp 16   Wt 54 kg (119 lb)   SpO2 97%   BMI 22.48 kg/m    Body mass index is 22.48 kg/m .  Physical Exam   GENERAL: healthy, alert and no distress  RESP: lungs clear to auscultation - no rales, rhonchi or wheezes  CV: regular rates and rhythm and normal S1 S2, no S3 or S4  ABDOMEN: soft, nontender, no hepatosplenomegaly, no masses and bowel sounds normal  MS: left shoulder no pain around the rotator cuff attachments  Full ROM of the shoulder no pain  Tender area top of shoulder only mild  Left chest wall   With pain around rib 3-4 laterally and then lower rib cage too   SKIN: no suspicious lesions or rashes

## 2022-10-22 ENCOUNTER — HEALTH MAINTENANCE LETTER (OUTPATIENT)
Age: 72
End: 2022-10-22

## 2022-11-12 DIAGNOSIS — K21.9 GASTROESOPHAGEAL REFLUX DISEASE, UNSPECIFIED WHETHER ESOPHAGITIS PRESENT: ICD-10-CM

## 2022-11-12 DIAGNOSIS — Z00.00 ENCOUNTER FOR MEDICARE ANNUAL WELLNESS EXAM: ICD-10-CM

## 2022-11-14 NOTE — TELEPHONE ENCOUNTER
Prescription approved per [facility] Refill Protocol.  Ester WASHINGTON RN  [facility]

## 2022-12-14 ENCOUNTER — HOSPITAL ENCOUNTER (OUTPATIENT)
Dept: MAMMOGRAPHY | Facility: CLINIC | Age: 72
Discharge: HOME | End: 2022-12-14
Attending: FAMILY MEDICINE | Admitting: FAMILY MEDICINE
Payer: COMMERCIAL

## 2022-12-14 DIAGNOSIS — Z12.31 VISIT FOR SCREENING MAMMOGRAM: ICD-10-CM

## 2022-12-14 PROCEDURE — 77067 SCR MAMMO BI INCL CAD: CPT

## 2022-12-16 ENCOUNTER — HOSPITAL ENCOUNTER (OUTPATIENT)
Dept: MAMMOGRAPHY | Facility: CLINIC | Age: 72
Discharge: HOME | End: 2022-12-16
Attending: FAMILY MEDICINE | Admitting: FAMILY MEDICINE
Payer: COMMERCIAL

## 2022-12-16 DIAGNOSIS — R92.8 ABNORMAL MAMMOGRAM: ICD-10-CM

## 2022-12-16 PROCEDURE — 77065 DX MAMMO INCL CAD UNI: CPT | Mod: RT

## 2022-12-20 ENCOUNTER — NURSE TRIAGE (OUTPATIENT)
Dept: FAMILY MEDICINE | Facility: CLINIC | Age: 72
End: 2022-12-20

## 2022-12-20 NOTE — TELEPHONE ENCOUNTER
Pt is scheduled to have a biopsy next week and is wondering if ok to still get her Covid booster.   Unable to answer question; recommended [facility] web site or contact office where procedure is scheduled or can also send PCP a note, with a callback from the clinic.   Pt will contact office where she is getting biopsy done to request booster recommendations.     Reason for Disposition    COVID-19 Prevention and Healthy Living, questions about    Additional Information    Negative: [1] Symptoms of COVID-19 (e.g., cough, fever, SOB, or others) AND [2] within 14 days of EXPOSURE (close contact) with diagnosed or suspected COVID-19 patient    Negative: [1] Symptoms of COVID-19 (e.g., cough, fever, SOB, or others) AND [2] within 14 days of being at a crowded indoor or outdoor event (e.g., concert, festival, rally, wedding)    Negative: Typical COVID-19 symptoms (e.g., cough, difficulty breathing, loss of taste or smell, runny nose, sore throat) that are NOT expected from vaccine    Negative: [1] COVID-19 exposure AND [2] no symptoms, or symptoms not typical of COVID-19    Negative: Up-to-date on COVID-19 vaccination and exposure to COVID-19, Frequently Asked Questions (FAQs)    Negative: Requesting COVID-19 vaccine    Negative: COVID-19 vaccine, Frequently Asked Questions (FAQs)    Negative: COVID-19 vaccine, systemic reactions (e.g., fatigue, fever, muscle aches), questions about    Negative: COVID-19 vaccine, injection site reaction (e.g., pain, redness, swelling), question about    Negative: [1] Pain, tenderness, or swelling at the injection site AND [2] lasts > 7 days    Negative: [1] Lymph node swelling (i.e., armpit or neck on side of vaccine) AND [2] lasts > 3 weeks    Negative: [1] Fever > 100.0 F (37.8 C) AND [2] healthcare worker    Negative: [1] Pain, tenderness, or swelling at the injection site AND [2] over 3 days (72 hours) since vaccine AND [3] getting worse    Negative: [1] Redness around the injection site AND  [2] started > 48 hours after getting vaccine  (Exception: Red area < 1 inch or 2.5 cm wide.)    Negative: [1] Fever > 100.0 F (37.8 C) AND [2] present > 3 days (72 hours)    Negative: [1] Fever > 101 F (38.3 C) AND [2] over 60 years of age AND [3] started > 48 hours after getting vaccine    Negative: [1] Fever > 100.0 F (37.8 C) AND [2] bedridden (e.g., nursing home patient, CVA, chronic illness, recovering from surgery) AND [3] started > 48 hours after getting vaccine    Negative: [1] Fever > 100.0 F (37.8 C) AND [2] diabetes mellitus or weak immune system (e.g., HIV positive, cancer chemo, splenectomy, organ transplant, chronic steroids) AND [3] started > 48 hours after getting vaccine    Negative: Fever > 104 F (40 C)    Negative: Sounds like a severe, unusual reaction to the triager    Negative: Difficulty breathing or swallowing and starts within 2 hours after injection    Negative: Sounds like a life-threatening emergency to the triager    Protocols used: CORONAVIRUS (COVID-19) VACCINE QUESTIONS AND [id number]

## 2022-12-29 ENCOUNTER — HOSPITAL ENCOUNTER (OUTPATIENT)
Dept: MAMMOGRAPHY | Facility: CLINIC | Age: 72
Discharge: HOME | End: 2022-12-29
Attending: FAMILY MEDICINE
Payer: COMMERCIAL

## 2022-12-29 DIAGNOSIS — R92.8 ABNORMAL MAMMOGRAM: ICD-10-CM

## 2022-12-29 PROCEDURE — 272N000715 MA STEREOTACTIC BREAST BIOPSY VACUUM RT

## 2022-12-29 PROCEDURE — 999N000065 MA POST PROCEDURE RIGHT

## 2022-12-29 PROCEDURE — 250N000009 HC RX 250: Performed by: RADIOLOGY

## 2022-12-29 PROCEDURE — 88305 TISSUE EXAM BY PATHOLOGIST: CPT | Mod: TC | Performed by: FAMILY MEDICINE

## 2022-12-29 RX ADMIN — LIDOCAINE HYDROCHLORIDE 5 ML: 10 INJECTION, SOLUTION INFILTRATION; PERINEURAL at 13:06

## 2022-12-29 RX ADMIN — LIDOCAINE HYDROCHLORIDE 10 ML: 10; .005 INJECTION, SOLUTION EPIDURAL; INFILTRATION; INTRACAUDAL; PERINEURAL at 13:06

## 2022-12-29 NOTE — DISCHARGE INSTRUCTIONS

## 2022-12-30 ENCOUNTER — TELEPHONE (OUTPATIENT)
Dept: MAMMOGRAPHY | Facility: CLINIC | Age: 72
End: 2022-12-30

## 2022-12-30 LAB
PATH REPORT.COMMENTS IMP SPEC: NORMAL
PATH REPORT.FINAL DX SPEC: NORMAL
PATH REPORT.GROSS SPEC: NORMAL
PATH REPORT.MICROSCOPIC SPEC OTHER STN: NORMAL
PATH REPORT.RELEVANT HX SPEC: NORMAL
PHOTO IMAGE: NORMAL

## 2022-12-30 PROCEDURE — 88305 TISSUE EXAM BY PATHOLOGIST: CPT | Mod: 26 | Performed by: PATHOLOGY

## 2022-12-30 NOTE — TELEPHONE ENCOUNTER
Attempted to contact Dorie regarding pathology results from Right breast biopsy performed on 12/28/2022 revealing:     [facility]  Dorie Gagnon [id number]  F, 1950  Surgical Pathology Report (Final result) [id number]  Authorizing Provider: Lydia Durand MD Ordering Provider: Lydia Durand MD  Ordering Location: [facility]  Collected: 12/29/2022 01:11 PM  Pathologist: Diana Starkey Received: 12/29/2022 02:44 PM  .  Specimens  A Breast, Right  .  .  Final Diagnosis  A. Breast, right, 12:00, 5 cm from nipple (0.4 cm area of microcalcifications), stereotactic-guided needle core biopsy:  -Hyalinized fibroadenoma with associated calcifications.  -See comment.  Electronically signed by Diana Starkey on 12/30/2022 at 10:32 AM     Per radiologist, Dr. Oscar Sotomayor, results are concordant with imaging findings.     Recommendation: Annual Screening Mammograms     Results and Recommendations released into My Chart. Left message for Dorie to call [phone] with any questions or concerns.      Emily Edward RN BSN  Procedure Nurse  [facility]  [phone]

## 2023-01-19 ENCOUNTER — ANCILLARY PROCEDURE (OUTPATIENT)
Dept: GENERAL RADIOLOGY | Facility: CLINIC | Age: 73
End: 2023-01-19
Attending: PHYSICIAN ASSISTANT
Payer: COMMERCIAL

## 2023-01-19 ENCOUNTER — OFFICE VISIT (OUTPATIENT)
Dept: FAMILY MEDICINE | Facility: CLINIC | Age: 73
End: 2023-01-19
Payer: COMMERCIAL

## 2023-01-19 VITALS
BODY MASS INDEX: 23.41 KG/M2 | TEMPERATURE: 98.1 F | RESPIRATION RATE: 14 BRPM | WEIGHT: 124 LBS | OXYGEN SATURATION: 98 % | HEIGHT: 61 IN | SYSTOLIC BLOOD PRESSURE: 132 MMHG | DIASTOLIC BLOOD PRESSURE: 68 MMHG | HEART RATE: 81 BPM

## 2023-01-19 DIAGNOSIS — K60.2 ANAL FISSURE: Primary | ICD-10-CM

## 2023-01-19 DIAGNOSIS — K60.2 ANAL FISSURE: ICD-10-CM

## 2023-01-19 DIAGNOSIS — M25.551 HIP PAIN, RIGHT: ICD-10-CM

## 2023-01-19 PROCEDURE — 73502 X-RAY EXAM HIP UNI 2-3 VIEWS: CPT | Mod: TC | Performed by: RADIOLOGY

## 2023-01-19 PROCEDURE — 99214 OFFICE O/P EST MOD 30 MIN: CPT | Performed by: PHYSICIAN ASSISTANT

## 2023-01-19 RX ORDER — NITROGLYCERIN 4 MG/G
1 OINTMENT RECTAL EVERY 12 HOURS
Qty: 30 G | Refills: 0 | Status: SHIPPED | OUTPATIENT
Start: 2023-01-19 | End: 2023-02-14

## 2023-01-19 ASSESSMENT — PAIN SCALES - GENERAL: PAINLEVEL: NO PAIN (0)

## 2023-01-19 NOTE — PROGRESS NOTES
Assessment & Plan     Anal fissure  Itching and discomfort likely due to anal fissure.  Recommend that she may use nitroglycerine ointment, increase fiber/hydration and avoid constipation.   Follow up if not improving in 2-4 weeks  - nitroGLYcerin (RECTIV) 0.4 % OINT rectal ointment; Place 1 inch (1.5 mg) rectally every 12 hours    Hip pain, right  On exam tenderness is isolated around the hip, will get x ray today.  Advised that she may try gentle stretching, NSAIDs and heat to the area.  May consider PT vs hip injection pending  X ray read  - XR Pelvis w Hip Right G/E 2 Views; Future        Return in about 4 weeks (around 2/16/2023).    JENIFER Turner [facility]    [place]   Dorie is a 72 year old presenting for the following health issues:  Sore      History of Present Illness       Reason for visit:  Annoying pain on right side when sitting mostly and anal itch  Symptom onset:  1-2 weeks ago  Symptoms include:  Pain when sitting mostly  Symptom intensity:  Mild  Symptom progression:  Improving  Had these symptoms before:  No  What makes it worse:  Sitting mostly  What makes it better:  Not sitting, moving around, taking tylenol    She eats 2-3 servings of fruits and vegetables daily.She consumes 0 sweetened beverage(s) daily.She exercises with enough effort to increase her heart rate 9 or less minutes per day.  She exercises with enough effort to increase her heart rate 3 or less days per week.   She is taking medications regularly.       Dorie presents to the clinic with anal itching and discomfort while having Bms. This has been ongoing x 3 weeks, no melena, hematochezia or abdominal pain.   She has tried anti itch cream which has helped somewhat.    Additionally, she has been experiencing and aching sensation bilaterally in her buttocks/thighs that is worse when she sits  She does not have this sensation while standing or laying flat.  No radicular symptoms, no LE  "weakness, no back pain.  Has hx of right hip arthritis. No recent injury    Review of Systems   Constitutional, HEENT, cardiovascular, pulmonary, gi and gu systems are negative, except as otherwise noted.      Objective    /68   Pulse 81   Temp 98.1  F (36.7  C) (Temporal)   Resp 14   Ht 1.549 m (5' 1\")   Wt 56.2 kg (124 lb)   SpO2 98%   BMI 23.43 kg/m    Body mass index is 23.43 kg/m .  Physical Exam   GENERAL: healthy, alert and no distress  RECTAL (female):  Small, non bleeding anal fissure noted  MS: no gross musculoskeletal defects noted, no edema, diffuse mild TTP around right lateral hip, FROM of LE, 5/5 strength of LE bilaterally, no TTP of lumbar spine or paraspinal muscles noted.  SKIN: no suspicious lesions or rashes  NEURO: Normal strength and tone, mentation intact and speech normal  PSYCH: mentation appears normal, affect normal/bright                "

## 2023-01-19 NOTE — TELEPHONE ENCOUNTER
The medication save prescribed today for anal fissure  is $400. Is there something else she can try ?     The pharmacy is [facility].     Please provide feedback on x-ray.     Kanwal Patel RN  [facility]

## 2023-01-20 DIAGNOSIS — K60.2 ANAL FISSURE: ICD-10-CM

## 2023-01-20 RX ORDER — NITROGLYCERIN 4 MG/G
OINTMENT RECTAL
Qty: 30 G | Refills: 0 | OUTPATIENT
Start: 2023-01-20

## 2023-01-20 NOTE — TELEPHONE ENCOUNTER
New script for nifedipine sent to pharmacy. This needs to be compounded and so it was sent to [facility] in [place] on [address] which is a compounding pharmacy

## 2023-01-20 NOTE — RESULT ENCOUNTER NOTE
Dorie-  Here are your recent results.     Your hip x ray shows mild arthritis of your hips. Based on your symptoms, I suspect that your symptoms could be related to arthritis or some nerve compression in your low back resulting from these degenerative changes. If you are agreeable, I am going to place a physical therapy referral for you.  If physical therapy is not improving your symptoms the next step would be to see an orthopedic doctor.   Kranthi Bragg PA-C
right normal/left normal

## 2023-01-20 NOTE — TELEPHONE ENCOUNTER
Reached out to [facility] pharmacy in [place] on [address]. Nurse was told the closest compounding pharmacy to them would be [facility]. They state there is no [facility] pharmacy they know of that compounds and they do not compound at their location.    Ester WASHINGTON RN  [facility]

## 2023-01-20 NOTE — TELEPHONE ENCOUNTER
Patient Contact     S/w pt and relayed message from Kranthi Bragg PA-C, see below. She states her insurance will only cover at [facility] pharmacy and she states she cannot go to [facility]'s pharmacy. She is asking for an alternative can go to [facility] pharmacy. Unsure if there is a [facility] that compounds or if provider has idea for an alternative medication?     Ester WASHINGTON RN  [facility]

## 2023-01-20 NOTE — TELEPHONE ENCOUNTER
Patient will need to call [facility] and ask is there is a location where they have a compounding pharmacy

## 2023-01-20 NOTE — TELEPHONE ENCOUNTER
Received call from the patient requesting update on medication request: see below. Pt hoping to get medication prior to the weekend.     Pt also wanting update on xray and pain management medication recommendations.     Routing to provider for review.     Anne Levy RN

## 2023-01-20 NOTE — TELEPHONE ENCOUNTER
Happy to send to this pharmacy but not sure if patient can fill there.  She can call to see the cost of filling there or can also try [email or web address] for a coupon.  Alternatively, she can focus on hydration, sitz baths and increasing fiber in her diet to alleviate these uncomfortable symptoms

## 2023-01-23 ENCOUNTER — TELEPHONE (OUTPATIENT)
Dept: FAMILY MEDICINE | Facility: CLINIC | Age: 73
End: 2023-01-23
Payer: COMMERCIAL

## 2023-01-23 DIAGNOSIS — M25.551 HIP PAIN, RIGHT: Primary | ICD-10-CM

## 2023-01-23 RX ORDER — NITROGLYCERIN 4 MG/G
OINTMENT RECTAL
Qty: 30 G | Refills: 0 | OUTPATIENT
Start: 2023-01-23

## 2023-01-23 NOTE — TELEPHONE ENCOUNTER
Patient Contact     Attempt # 1     Was call answered?    No  Left non-detailed message to call the clinic back at [phone]. [email or web address] message sent as well.     On call back:      -Relay PT referral information.    Ester WASHINGTON RN  [facility]

## 2023-01-23 NOTE — TELEPHONE ENCOUNTER
Pt would like referral for PT as advised by PCP in result note below    Dorie-  Here are your recent results.      Your hip x ray shows mild arthritis of your hips. Based on your symptoms, I suspect that your symptoms could be related to arthritis or some nerve compression in your low back resulting from these degenerative changes. If you are agreeable, I am going to place a physical therapy referral for you.  If physical therapy is not improving your symptoms the next step would be to see an orthopedic doctor.   Kranthi GUERRERO RN  [facility] Triage

## 2023-01-23 NOTE — TELEPHONE ENCOUNTER
S/w pt who states she was able to get alternative ointment at [facility].     Temitope GUERRERO RN  [facility] Triage

## 2023-01-25 NOTE — TELEPHONE ENCOUNTER
I recommend that she begin PT for her hip pain.  We should give it a bit of time to see if she gets any improvement with PT, this is the reason for the 4 weeks. I can see her for follow up ( same day ok) the week of feb 13 or 20.

## 2023-01-25 NOTE — TELEPHONE ENCOUNTER
Patient Contact     S/w pt and relayed message from Kranthi Bragg PA-C, see below. Referral information was provided. She also states she was trying to schedule her 4 week f/u appointment with Kranthi but the soonest she could get was February 2nd. She is leaving on February 26th and after this Kranthi's first visit is in March. Pt wanted to be seen in-person. If this needs to be re-scheduled to closer to 4 weeks please let the team know where you want to squeeze pt in. Otherwise if in agreement to February 2nd no further action needed.    Ester WASHINGTON RN  [facility]

## 2023-01-25 NOTE — TELEPHONE ENCOUNTER
Patient Contact     S/w pt and relayed message from Kranthi Bragg PA-C, see below. She stated understanding and is re-scheduled for Feb 14th, as provider is not in clinic week of Feb 20th. No further questions at this time.    Ester WASHINGTON RN  [facility]

## 2023-02-02 ENCOUNTER — THERAPY VISIT (OUTPATIENT)
Dept: PHYSICAL THERAPY | Facility: CLINIC | Age: 73
End: 2023-02-02
Attending: PHYSICIAN ASSISTANT
Payer: COMMERCIAL

## 2023-02-02 DIAGNOSIS — M25.551 HIP PAIN, RIGHT: ICD-10-CM

## 2023-02-02 PROCEDURE — 97110 THERAPEUTIC EXERCISES: CPT | Mod: GP | Performed by: PHYSICAL THERAPIST

## 2023-02-02 PROCEDURE — 97161 PT EVAL LOW COMPLEX 20 MIN: CPT | Mod: GP | Performed by: PHYSICAL THERAPIST

## 2023-02-02 ASSESSMENT — ACTIVITIES OF DAILY LIVING (ADL)
STEPPING_UP_AND_DOWN_CURBS: NO DIFFICULTY AT ALL
TWISTING/PIVOTING_ON_INVOLVED_LEG: NO DIFFICULTY AT ALL
GETTING_INTO_AND_OUT_OF_A_BATHTUB: NO DIFFICULTY AT ALL
ROLLING_OVER_IN_BED: NO DIFFICULTY AT ALL
WALKING_INITIALLY: NO DIFFICULTY AT ALL
HOW_WOULD_YOU_RATE_YOUR_CURRENT_LEVEL_OF_FUNCTION_DURING_YOUR_USUAL_ACTIVITIES_OF_DAILY_LIVING_FROM_0_TO_100_WITH_100_BEING_YOUR_LEVEL_OF_FUNCTION_PRIOR_TO_YOUR_HIP_PROBLEM_AND_0_BEING_THE_INABILITY_TO_PERFORM_ANY_OF_YOUR_USUAL_DAILY_ACTIVITIES?: 99
HOS_ADL_COUNT: 16
WALKING_UP_STEEP_HILLS: SLIGHT DIFFICULTY
WALKING_15_MINUTES_OR_GREATER: NO DIFFICULTY AT ALL
HEAVY_WORK: MODERATE DIFFICULTY
GOING_UP_1_FLIGHT_OF_STAIRS: NO DIFFICULTY AT ALL
PUTTING_ON_SOCKS_AND_SHOES: NO DIFFICULTY AT ALL
STANDING_FOR_15_MINUTES: NO DIFFICULTY AT ALL
SITTING_FOR_15_MINUTES: SLIGHT DIFFICULTY
HOS_ADL_HIGHEST_POTENTIAL_SCORE: 64
WALKING_DOWN_STEEP_HILLS: NO DIFFICULTY AT ALL
LIGHT_TO_MODERATE_WORK: NO DIFFICULTY AT ALL
HOS_ADL_SCORE(%): 95.31
GETTING_INTO_AND_OUT_OF_AN_AVERAGE_CAR: NO DIFFICULTY AT ALL
GOING_DOWN_1_FLIGHT_OF_STAIRS: NO DIFFICULTY AT ALL
DEEP_SQUATTING: NO DIFFICULTY AT ALL
HOS_ADL_ITEM_SCORE_TOTAL: 61
WALKING_APPROXIMATELY_10_MINUTES: NO DIFFICULTY AT ALL

## 2023-02-02 NOTE — PROGRESS NOTES
Physical Therapy Initial Evaluation  Subjective:  Pt has had some PT in 2015 for lateral R hip pain that was keeping her from sleeping.  It helped somewhat.    The history is provided by the patient. No  was used.   Patient Health History  Dorie Gagnon being seen for B hip pain, B glut pain with sitting.     Problem began: 12/30/2022.   Problem occurred: insiduous   Pain is reported as 5/10 on pain scale.  General health as reported by patient is good.  Health conditions: acid reflux, high cholesterol.   Red flags:  None as reported by patient.  Medical allergies: none.   Surgeries include:  Other. Other surgery history details: breast biopsy - negative.     Other medications details: cholesterol meds, omeprizole for acid reflux.    Current occupation is retired.                     Therapist Generated HPI Evaluation         Type of problem:  Bilateral hips.    This is a new condition.  Condition occurred with:  Insidious onset.  Where condition occurred: for unknown reasons.  Patient reports pain:  Posterior.  Pain is described as aching and is intermittent.  Pain radiates to:  Hip (B lateral hip). Pain timing: worse with sitting.  Since onset symptoms are gradually improving.  Symptoms are exacerbated by sitting and lying on extremity (tolerates about 30 mn to 1 hr of sitting.  Lateral R hip pain with lying on R side)  and relieved by activity/movement.  Special tests included:  X-ray.  Previous treatment includes physical therapy. There was moderate improvement following previous treatment.  Barriers include:  None as reported by patient.    Patient Health History  Dorie Gagnon being seen for B hip pain, B glut pain with sitting.     Problem began: 12/30/2022.   Problem occurred: insiduous   Pain is reported as 5/10 on pain scale.  General health as reported by patient is good.  Health conditions: acid reflux, high cholesterol.   Red flags:  None as reported by patient.  Medical  allergies: none.   Surgeries include:  Other. Other surgery history details: breast biopsy - negative.     Other medications details: cholesterol meds, omeprizole for acid reflux.    Current occupation is retired.                                       Objective:  System                                           Hip Evaluation  Hip PROM:  Hip PROM:  Left Hip:    Normal  Right Hip:  Normal                          Hip Strength:    Flexion:   Left: 5/5   Pain:  Right: 5/5   Pain:                    Extension:  Left: 4+/5  Pain:Right: 4+/5    Pain:    Abduction:  Left: 4/5     Pain:Right: 4/5    Pain:    Internal Rotation:  Left: 5/5    Pain:Right: 5/5   Pain:  External Rotation:  Left: 5/5   Pain:  Right: 5/5   Pain:  Knee Flexion:  Left: 5/5   Pain:Right: 5/5   Pain:  Knee Extension:  Left: 5/5   Pain:Right: 5/5    Pain:        Hip Special Testing:   Special tests hip not assessed: (+) gapping test for SI, however all other SI tests (-)    Left hip negative for the following special tests:  Piriformis; Ary or SLR  Right hip negative for the following special tests:  Piriformis; Ary or SLR                   Victor Manuel Lumbar Evaluation    Posture:  Sitting: fair  Standing: good  Lordosis: WNL  Lateral Shift: no  Correction of Posture: no effect    Movement Loss:  Flexion (Flex): nil  Extension (EXT): nil  Side Glide R (SG R): nil  Side [place] L (SG L): nil      Conclusion: other  Principle of Treatment:  Posture Correction: will test effect of use of pillow/lumbar support whenever sitting on concordant B hip/glut pain        Other: core and hip strengthening, hip stretches.                                       ROS    Assessment/Plan:    Patient is a 73 year old female with B glut/hip pain complaints.    Patient has the following significant findings with corresponding treatment plan.                Diagnosis 1:  B glut/hip pain  Pain -  hot/cold therapy  Decreased ROM/flexibility - therapeutic exercise and home  program  Decreased strength - therapeutic exercise, therapeutic activities and home program  Impaired muscle performance - neuro re-education and home program  Decreased function - therapeutic activities and home program  Impaired posture - neuro re-education and home program    Therapy Evaluation Codes:   Cumulative Therapy Evaluation is: Low complexity.    Previous and current functional limitations:  (See Goal Flow Sheet for this information)    Short term and Long term goals: (See Goal Flow Sheet for this information)     Communication ability:  Patient appears to be able to clearly communicate and understand verbal and written communication and follow directions correctly.  Treatment Explanation - The following has been discussed with the patient:   RX ordered/plan of care  Anticipated outcomes  Possible risks and side effects  This patient would benefit from PT intervention to resume normal activities.   Rehab potential is good.    Frequency:  1 X week, once daily  Duration:  for 6 weeks  Discharge Plan:  Achieve all LTG.  Independent in home treatment program.  Reach maximal therapeutic benefit.    Please refer to the daily flowsheet for treatment today, total treatment time and time spent performing 1:1 timed codes.

## 2023-02-03 NOTE — PROGRESS NOTES
[facility]    OUTPATIENT Physical Therapy ORTHOPEDIC EVALUATION  PLAN OF TREATMENT FOR OUTPATIENT REHABILITATION  (COMPLETE FOR INITIAL CLAIMS ONLY)  Patient's Last Name, First Name, M.I.  YOB: 1950  oDrie Gagnon    Provider s Name:  [facility]   Medical Record No.  [id number]   Start of Care Date:  02/02/23   Onset Date:   12/30/22   Treatment Diagnosis:  B glut/hip pain Medical Diagnosis:  Hip pain, right       Goals:     02/02/23 0500   Body Part   Goals listed below are for B glut/hip pain   Goal #1   Goal #1 sitting   Previous Functional Level No restrictions   Current Functional Level Minutes patient can sit   Performance level 30  with 5/10 concordant B glut/hip pain   STG Target Performance Minutes patient will be able to sit   Performance level 30 with 2/10 concordant B glut/hip pain   Rationale to allow rest from standing;for job requirements in their work place;for community transportation   Due date 02/16/23   LTG Target Performance Hours patient will be able to sit   Performance Level 1 wtih 0-1/10 concordant B glut/hip pain   Rationale to allow rest from standing;for community transportation;for job requirements in their work place   Due date 03/16/23       Therapy Frequency:  1x/week  Predicted Duration of Therapy Intervention:  6 weeks    Sujatha James PT                 I CERTIFY THE NEED FOR THESE SERVICES FURNISHED UNDER        THIS PLAN OF TREATMENT AND WHILE UNDER MY CARE     (Physician attestation of this document indicates review and certification of the therapy plan).                     Certification Date From:  02/02/23   Certification Date To:  05/02/23    Referring Provider:  Kranthi Bragg    Initial Assessment        See Epic Evaluation SOC Date: 02/02/23

## 2023-02-06 ENCOUNTER — TRANSFERRED RECORDS (OUTPATIENT)
Dept: HEALTH INFORMATION MANAGEMENT | Facility: CLINIC | Age: 73
End: 2023-02-06
Payer: COMMERCIAL

## 2023-02-09 ENCOUNTER — THERAPY VISIT (OUTPATIENT)
Dept: PHYSICAL THERAPY | Facility: CLINIC | Age: 73
End: 2023-02-09
Payer: COMMERCIAL

## 2023-02-09 DIAGNOSIS — M25.551 HIP PAIN, RIGHT: Primary | ICD-10-CM

## 2023-02-09 PROCEDURE — 97110 THERAPEUTIC EXERCISES: CPT | Mod: GP | Performed by: PHYSICAL THERAPIST

## 2023-02-09 PROCEDURE — 97112 NEUROMUSCULAR REEDUCATION: CPT | Mod: GP | Performed by: PHYSICAL THERAPIST

## 2023-02-14 ENCOUNTER — OFFICE VISIT (OUTPATIENT)
Dept: FAMILY MEDICINE | Facility: CLINIC | Age: 73
End: 2023-02-14
Payer: COMMERCIAL

## 2023-02-14 VITALS
DIASTOLIC BLOOD PRESSURE: 80 MMHG | HEIGHT: 61 IN | HEART RATE: 71 BPM | SYSTOLIC BLOOD PRESSURE: 142 MMHG | RESPIRATION RATE: 15 BRPM | WEIGHT: 127 LBS | BODY MASS INDEX: 23.98 KG/M2 | OXYGEN SATURATION: 98 % | TEMPERATURE: 98.1 F

## 2023-02-14 DIAGNOSIS — M25.551 HIP PAIN, RIGHT: Primary | ICD-10-CM

## 2023-02-14 DIAGNOSIS — M54.16 LUMBAR RADICULOPATHY: ICD-10-CM

## 2023-02-14 PROCEDURE — 99213 OFFICE O/P EST LOW 20 MIN: CPT | Performed by: PHYSICIAN ASSISTANT

## 2023-02-14 ASSESSMENT — PAIN SCALES - GENERAL: PAINLEVEL: NO PAIN (0)

## 2023-02-14 NOTE — PROGRESS NOTES
"  Assessment & Plan     Hip pain, right  Improving.  Continue PT.  If persistent, will follow up when she returns to [place].  May consider lumbar MRI and ortho referral at that time if conservative treatment is not resolving the problem    Lumbar radiculopathy  See above          Return in about 5 days (around 2/19/2023) for Physical Exam.    JENIFER Turner [facility] SHAMIKA Oshea is a 73 year old presenting for the following health issues:  RECHECK      History of Present Illness       Reason for visit:  Follow-up vidit    She eats 2-3 servings of fruits and vegetables daily.She consumes 0 sweetened beverage(s) daily.She exercises with enough effort to increase her heart rate 10 to 19 minutes per day.  She exercises with enough effort to increase her heart rate 3 or less days per week.   She is taking medications regularly.       Shira presents to the clinic to follow up on her hip and low back symptoms.   She has been experiencing a pressure like discomfort in her posterior thighs and buttocks that is worse with sitting down  She is currently following this with PT.  Is planning to leave for [place] for 5 weeks next week.  Pain/discomfort has improved somewhat, is not worsening.     Review of Systems   Constitutional, HEENT, cardiovascular, pulmonary, gi and gu systems are negative, except as otherwise noted.      Objective    BP (!) 142/80   Pulse 71   Temp 98.1  F (36.7  C) (Temporal)   Resp 15   Ht 1.549 m (5' 1\")   Wt 57.6 kg (127 lb)   SpO2 98%   BMI 24.00 kg/m    Body mass index is 24 kg/m .  Physical Exam   GENERAL: healthy, alert and no distress  PSYCH: mentation appears normal, affect normal/bright                "

## 2023-02-16 ASSESSMENT — ENCOUNTER SYMPTOMS
WEAKNESS: 0
CHILLS: 1
HEMATOCHEZIA: 0
ABDOMINAL PAIN: 0
PALPITATIONS: 0
CONSTIPATION: 0
SHORTNESS OF BREATH: 0
ARTHRALGIAS: 1
DIARRHEA: 0
NERVOUS/ANXIOUS: 0
DIZZINESS: 0
HEARTBURN: 0
JOINT SWELLING: 0
COUGH: 0
FEVER: 0
HEADACHES: 0
EYE PAIN: 0
HEMATURIA: 0
PARESTHESIAS: 0
MYALGIAS: 1
BREAST MASS: 0
SORE THROAT: 0
NAUSEA: 0
DYSURIA: 0
FREQUENCY: 0

## 2023-02-16 ASSESSMENT — ACTIVITIES OF DAILY LIVING (ADL): CURRENT_FUNCTION: NO ASSISTANCE NEEDED

## 2023-02-17 ENCOUNTER — OFFICE VISIT (OUTPATIENT)
Dept: FAMILY MEDICINE | Facility: CLINIC | Age: 73
End: 2023-02-17
Payer: COMMERCIAL

## 2023-02-17 DIAGNOSIS — M25.551 HIP PAIN, BILATERAL: ICD-10-CM

## 2023-02-17 DIAGNOSIS — E67.3 HYPERVITAMINOSIS D: ICD-10-CM

## 2023-02-17 DIAGNOSIS — R10.2 PELVIC PAIN IN FEMALE: ICD-10-CM

## 2023-02-17 DIAGNOSIS — M46.1 DEGENERATIVE JOINT DISEASE OF SACROILIAC JOINT (H): ICD-10-CM

## 2023-02-17 DIAGNOSIS — M25.552 HIP PAIN, BILATERAL: ICD-10-CM

## 2023-02-17 DIAGNOSIS — E78.5 DYSLIPIDEMIA: ICD-10-CM

## 2023-02-17 DIAGNOSIS — Z00.00 ENCOUNTER FOR MEDICARE ANNUAL WELLNESS EXAM: Primary | ICD-10-CM

## 2023-02-17 DIAGNOSIS — M85.80 OSTEOPENIA, UNSPECIFIED LOCATION: ICD-10-CM

## 2023-02-17 DIAGNOSIS — Z78.0 ASYMPTOMATIC MENOPAUSE: ICD-10-CM

## 2023-02-17 LAB
ALBUMIN SERPL BCG-MCNC: 4.6 G/DL (ref 3.5–5.2)
ALP SERPL-CCNC: 53 U/L (ref 35–104)
ALT SERPL W P-5'-P-CCNC: 31 U/L (ref 10–35)
ANION GAP SERPL CALCULATED.3IONS-SCNC: 14 MMOL/L (ref 7–15)
AST SERPL W P-5'-P-CCNC: 33 U/L (ref 10–35)
BILIRUB SERPL-MCNC: 0.4 MG/DL
BUN SERPL-MCNC: 11.4 MG/DL (ref 8–23)
CALCIUM SERPL-MCNC: 9.7 MG/DL (ref 8.8–10.2)
CHLORIDE SERPL-SCNC: 101 MMOL/L (ref 98–107)
CHOLEST SERPL-MCNC: 204 MG/DL
CREAT SERPL-MCNC: 0.77 MG/DL (ref 0.51–0.95)
DEPRECATED HCO3 PLAS-SCNC: 22 MMOL/L (ref 22–29)
ERYTHROCYTE [DISTWIDTH] IN BLOOD BY AUTOMATED COUNT: 13.3 % (ref 10–15)
GFR SERPL CREATININE-BSD FRML MDRD: 80 ML/MIN/1.73M2
GLUCOSE SERPL-MCNC: 105 MG/DL (ref 70–99)
HCT VFR BLD AUTO: 36.3 % (ref 35–47)
HDLC SERPL-MCNC: 76 MG/DL
HGB BLD-MCNC: 12.3 G/DL (ref 11.7–15.7)
LDLC SERPL CALC-MCNC: 117 MG/DL
MCH RBC QN AUTO: 31 PG (ref 26.5–33)
MCHC RBC AUTO-ENTMCNC: 33.9 G/DL (ref 31.5–36.5)
MCV RBC AUTO: 91 FL (ref 78–100)
NONHDLC SERPL-MCNC: 128 MG/DL
PLATELET # BLD AUTO: 269 10E3/UL (ref 150–450)
POTASSIUM SERPL-SCNC: 4.5 MMOL/L (ref 3.4–5.3)
PROT SERPL-MCNC: 7 G/DL (ref 6.4–8.3)
RBC # BLD AUTO: 3.97 10E6/UL (ref 3.8–5.2)
SODIUM SERPL-SCNC: 137 MMOL/L (ref 136–145)
TRIGL SERPL-MCNC: 57 MG/DL
WBC # BLD AUTO: 4.8 10E3/UL (ref 4–11)

## 2023-02-17 PROCEDURE — 80061 LIPID PANEL: CPT | Performed by: INTERNAL MEDICINE

## 2023-02-17 PROCEDURE — 82306 VITAMIN D 25 HYDROXY: CPT | Performed by: INTERNAL MEDICINE

## 2023-02-17 PROCEDURE — 80053 COMPREHEN METABOLIC PANEL: CPT | Performed by: INTERNAL MEDICINE

## 2023-02-17 PROCEDURE — 99214 OFFICE O/P EST MOD 30 MIN: CPT | Mod: 25 | Performed by: INTERNAL MEDICINE

## 2023-02-17 PROCEDURE — 85027 COMPLETE CBC AUTOMATED: CPT | Performed by: INTERNAL MEDICINE

## 2023-02-17 PROCEDURE — 36415 COLL VENOUS BLD VENIPUNCTURE: CPT | Performed by: INTERNAL MEDICINE

## 2023-02-17 PROCEDURE — G0439 PPPS, SUBSEQ VISIT: HCPCS | Performed by: INTERNAL MEDICINE

## 2023-02-17 RX ORDER — INFLUENZA A VIRUS A/MICHIGAN/45/2015 X-275 (H1N1) ANTIGEN (FORMALDEHYDE INACTIVATED), INFLUENZA A VIRUS A/SINGAPORE/INFIMH-16-0019/2016 IVR-186 (H3N2) ANTIGEN (FORMALDEHYDE INACTIVATED), INFLUENZA B VIRUS B/PHUKET/3073/2013 ANTIGEN (FORMALDEHYDE INACTIVATED), AND INFLUENZA B VIRUS B/MARYLAND/15/2016 BX-69A ANTIGEN (FORMALDEHYDE INACTIVATED) 60; 60; 60; 60 UG/.7ML; UG/.7ML; UG/.7ML; UG/.7ML
INJECTION, SUSPENSION INTRAMUSCULAR
COMMUNITY
Start: 2022-10-28 | End: 2023-12-11

## 2023-02-17 RX ORDER — PSYLLIUM HUSK 0.4 G
CAPSULE ORAL
COMMUNITY

## 2023-02-17 ASSESSMENT — ENCOUNTER SYMPTOMS
CHILLS: 1
HEARTBURN: 0
SORE THROAT: 0
WEAKNESS: 0
PALPITATIONS: 0
HEMATURIA: 0
ARTHRALGIAS: 1
ABDOMINAL PAIN: 0
CONSTIPATION: 0
DIARRHEA: 0
BREAST MASS: 0
HEADACHES: 0
SHORTNESS OF BREATH: 0
JOINT SWELLING: 0
FREQUENCY: 0
PARESTHESIAS: 0
NERVOUS/ANXIOUS: 0
NAUSEA: 0
DYSURIA: 0
EYE PAIN: 0
MYALGIAS: 1
FEVER: 0
DIZZINESS: 0
HEMATOCHEZIA: 0
COUGH: 0

## 2023-02-17 ASSESSMENT — PAIN SCALES - GENERAL: PAINLEVEL: NO PAIN (0)

## 2023-02-17 ASSESSMENT — ACTIVITIES OF DAILY LIVING (ADL): CURRENT_FUNCTION: NO ASSISTANCE NEEDED

## 2023-02-17 NOTE — PROGRESS NOTES
"SUBJECTIVE:   Dorie is a 73 year old who presents for Preventive Visit.    Patient has been advised of split billing requirements and indicates understanding: Yes  Are you in the first 12 months of your Medicare coverage?  No    Healthy Habits:     In general, how would you rate your overall health?  Good    Frequency of exercise:  2-3 days/week    Duration of exercise:  15-30 minutes    Do you usually eat at least 4 servings of fruit and vegetables a day, include whole grains    & fiber and avoid regularly eating high fat or \"junk\" foods?  Yes    Taking medications regularly:  Yes    Medication side effects:  None    Ability to successfully perform activities of daily living:  No assistance needed    Home Safety:  No safety concerns identified    Hearing Impairment:  Difficulty following a conversation in a noisy restaurant or crowded room and difficulty understanding soft or whispered speech    In the past 6 months, have you been bothered by leaking of urine?  No    In general, how would you rate your overall mental or emotional health?  Good      PHQ-2 Total Score: 0    Additional concerns today:  Yes      Have you ever done Advance Care Planning? (For example, a Health Directive, POLST, or a discussion with a medical provider or your loved ones about your wishes): Yes, advance care planning is on file.       Fall risk  Fallen 2 or more times in the past year?: No  Any fall with injury in the past year?: Yes  click delete button to remove this line now  Cognitive Screening   1) Repeat 3 items (Leader, Season, Table)    2) Clock draw: NORMAL  3) 3 item recall: Recalls 3 objects  Results: 3 items recalled: COGNITIVE IMPAIRMENT LESS LIKELY    Mini-CogTM Garth Chacon. Licensed by the author for use in [facility]; reprinted with permission (fely@.[facility]). All rights reserved.      Do you have sleep apnea, excessive snoring or daytime drowsiness?: no    Reviewed and updated as needed this visit by " clinical staff   Tobacco  Allergies  Meds  Problems  Med Hx  Surg Hx  Fam Hx          Reviewed and updated as needed this visit by Provider   Tobacco  Allergies  Meds  Problems  Med Hx  Surg Hx  Fam Hx         Social History     Tobacco Use     Smoking status: Never     Smokeless tobacco: Never   Substance Use Topics     Alcohol use: Not Currently     Comment: Wine occasionally     If you drink alcohol do you typically have >3 drinks per day or >7 drinks per week? Yes    No flowsheet data found.    Current providers sharing in care for this patient include:   Patient Care Team:  Lori Escamilla MD as PCP - General (Internal Medicine)  Lori Escamilla MD as Assigned PCP  Nancy Dumont PA-C as Assigned Heart and Vascular Provider    The following health maintenance items are reviewed in Epic and correct as of today:  Health Maintenance   Topic Date Due     ANNUAL REVIEW OF HM ORDERS  01/19/2024     MEDICARE ANNUAL WELLNESS VISIT  02/17/2024     FALL RISK ASSESSMENT  02/17/2024     MAMMO SCREENING  12/16/2024     DTAP/TDAP/TD IMMUNIZATION (3 - Td or Tdap) 01/18/2028     LIPID  02/17/2028     ADVANCE CARE PLANNING  02/17/2028     COLORECTAL CANCER SCREENING  02/10/2031     DEXA  11/19/2033     HEPATITIS C SCREENING  Completed     PHQ-2 (once per calendar year)  Completed     INFLUENZA VACCINE  Completed     Pneumococcal Vaccine: 65+ Years  Completed     ZOSTER IMMUNIZATION  Completed     COVID-19 Vaccine  Completed     IPV IMMUNIZATION  Aged Out     MENINGITIS IMMUNIZATION  Aged Out     Lab work is in process  Labs reviewed in [facility]  Mammogram Screening: Mammogram Screening - Patient over age 75, has elected to continue with screening.  Pertinent mammograms are reviewed under the imaging tab.    Review of Systems   Constitutional: Positive for chills. Negative for fever.   HENT: Negative for congestion, ear pain, hearing loss and sore throat.    Eyes: Negative for pain and visual disturbance.  "  Respiratory: Negative for cough and shortness of breath.    Cardiovascular: Negative for chest pain, palpitations and peripheral edema.   Gastrointestinal: Negative for abdominal pain, constipation, diarrhea, heartburn, hematochezia and nausea.   Breasts:  Negative for tenderness, breast mass and discharge.   Genitourinary: Negative for dysuria, frequency, genital sores, hematuria, pelvic pain, urgency, vaginal bleeding and vaginal discharge.   Musculoskeletal: Positive for arthralgias and myalgias. Negative for joint swelling.   Skin: Negative for rash.   Neurological: Negative for dizziness, weakness, headaches and paresthesias.   Psychiatric/Behavioral: Negative for mood changes. The patient is not nervous/anxious.      Constitutional, HEENT, cardiovascular, pulmonary, GI, , musculoskeletal, neuro, skin, endocrine and psych systems are negative, except as otherwise noted.    OBJECTIVE:   /80   Pulse 83   Temp 98.1  F (36.7  C) (Temporal)   Resp 23   Ht 1.549 m (5' 1\")   Wt 55.8 kg (123 lb)   SpO2 99%   BMI 23.24 kg/m   Estimated body mass index is 23.24 kg/m  as calculated from the following:    Height as of this encounter: 1.549 m (5' 1\").    Weight as of this encounter: 55.8 kg (123 lb).  Physical Exam  GENERAL APPEARANCE: healthy, alert and no distress  EYES: Eyes grossly normal to inspection, PERRL and conjunctivae and sclerae normal  HENT: ear canals and TM's normal, nose and mouth without ulcers or lesions, oropharynx clear and oral mucous membranes moist  NECK: no adenopathy, no asymmetry, masses, or scars and thyroid normal to palpation  RESP: lungs clear to auscultation - no rales, rhonchi or wheezes  BREAST: Defered , Mammogram.   CV: regular rate and rhythm, normal S1 S2, no S3 or S4, no murmur, click or rub, no peripheral edema and peripheral pulses strong  ABDOMEN: soft, nontender, no hepatosplenomegaly, no masses and bowel sounds normal  MS: no musculoskeletal defects are noted and " gait is age appropriate without ataxia  HIP EXAM : Negative for any tenderness on palpation or restriction of ROM except subjective muscle ache on the lower part of the Rt ischial region. No tenderness on palpation of the SI joints bilaterally.  SKIN: no suspicious lesions or rashes  NEURO: Normal strength and tone, sensory exam grossly normal, mentation intact and speech normal  PSYCH: mentation appears normal and affect normal/bright    Diagnostic Test Results:  Labs reviewed in Epic    ASSESSMENT / PLAN:     Assessment and Plan  1. Encounter for Medicare annual wellness exam  Last seen pt on 5/2022 for follow up on PVCs. Pt has seen our group in the interim twice for Anal fissure and Hip pain. Also had breast biopsy due to abnormal Mammogram in 12/2022 with fibroadenoma on the pathology.   - DX Hip/Pelvis/Spine; Future  - Comprehensive metabolic panel (BMP + Alb, Alk Phos, ALT, AST, Total. Bili, TP); Future  - CBC with platelets; Future  - Lipid panel reflex to direct LDL Fasting; Future  - Vitamin D Deficiency; Future  - Comprehensive metabolic panel (BMP + Alb, Alk Phos, ALT, AST, Total. Bili, TP)  - CBC with platelets  - Lipid panel reflex to direct LDL Fasting  - Vitamin D Deficiency    2. Hip pain, bilateral  3. Degenerative joint disease of sacroiliac joint (H)  Recent OV with our group for bilateral hip pain and pt was evaluated with pelvic X ray positive for SI joint degenerative changes.Physical exam negative for any tenderness on palpation or restriction of ROM except subjective muscle ache on the lower part of the Rt ischial region. No tenderness on palpation of the SI joints bilaterally.  To continue current PT on the referral placed and as needed tylenol for pain.   - Comprehensive metabolic panel (BMP + Alb, Alk Phos, ALT, AST, Total. Bili, TP); Future  - Comprehensive metabolic panel (BMP + Alb, Alk Phos, ALT, AST, Total. Bili, TP)    4. Osteopenia, unspecified location  5. Asymptomatic  menopause  - DX Hip/Pelvis/Spine; Future    6. Dyslipidemia  - Lipid panel reflex to direct LDL Fasting; Future  - Lipid panel reflex to direct LDL Fasting    7. Hypervitaminosis D  - Vitamin D Deficiency; Future  - Vitamin D Deficiency    8. Pelvic pain in female  New problem, given the pelvic pain will make sure that if no UTI before considering transvaginal ultrasound.  UPDATE -patient has left the urinalysis sample 2/23/23 which is negative for WBC/infection though showing trace leukocyte esterase.  No need of any antibiotics, will recommend well hydration for improvement.  - Placed pelvic ultrasound to make sure there is no other pelvic pathologies causing this pelvic pain.  - UA with Microscopic reflex to Culture - lab collect; Future  - US Pelvic Complete with Transvaginal; Future         Patient Instructions     As discussed , please do fasting labs ordered.     Ordered for DEXA scan       Patient Education   Personalized Prevention Plan  You are due for the preventive services outlined below.  Your care team is available to assist you in scheduling these services.  If you have already completed any of these items, please share that information with your care team to update in your medical record.  Health Maintenance Due   Topic Date Due     Annual Wellness Visit  02/15/2023       Signs of Hearing Loss      Hearing much better with one ear can be a sign of hearing loss.   Hearing loss is a problem shared by many people. In fact, it is one of the most common health problems, particularly as people age. Most people age 65 and older have some hearing loss. By age 80, almost everyone does. Hearing loss often occurs slowly over the years. So you may not realize your hearing has gotten worse.  Have your hearing checked  Call your healthcare provider if you:    Have to strain to hear normal conversation    Have to watch other people s faces very carefully to follow what they re saying    Need to ask people to  repeat what they ve said    Often misunderstand what people are saying    Turn the volume of the television or radio up so high that others complain    Feel that people are mumbling when they re talking to you    Find that the effort to hear leaves you feeling tired and irritated    Notice, when using the phone, that you hear better with one ear than the other  [employer] last reviewed this educational content on 1/1/2020 2000-2021 [employer]. All rights reserved. This information is not intended as a substitute for professional medical care. Always follow your healthcare professional's instructions.             Return in about 1 year (around 2/17/2024), or if symptoms worsen or fail to improve, for Annual Wellness Exam.    Lori Escamilla MD  [facility]      Patient has been advised of split billing requirements and indicates understanding: Yes      COUNSELING:  Reviewed preventive health counseling, as reflected in patient instructions  Special attention given to:       Regular exercise       Healthy diet/nutrition       Vision screening       Hearing screening       Dental care       Bladder control       Fall risk prevention       Osteoporosis prevention/bone health        She reports that she has never smoked. She has never used smokeless tobacco.      Appropriate preventive services were discussed with this patient, including applicable screening as appropriate for cardiovascular disease, diabetes, osteopenia/osteoporosis, and glaucoma.  As appropriate for age/gender, discussed screening for colorectal cancer, prostate cancer, breast cancer, and cervical cancer. Checklist reviewing preventive services available has been given to the patient.    Reviewed patients plan of care and provided an AVS. The Basic Care Plan (routine screening as documented in Health Maintenance) for Dorie meets the Care Plan requirement. This Care Plan has been established and reviewed with  the Patient.      Lori Escamilla MD  [facility]    Identified Health Risks:

## 2023-02-17 NOTE — PROGRESS NOTES
"    The patient was provided with written information regarding signs of hearing loss.  Answers for HPI/ROS submitted by the patient on 2/16/2023  In general, how would you rate your overall physical health?: good  Frequency of exercise:: 2-3 days/week  Do you usually eat at least 4 servings of fruit and vegetables a day, include whole grains & fiber, and avoid regularly eating high fat or \"junk\" foods? : Yes  Taking medications regularly:: Yes  Medication side effects:: None  Activities of Daily Living: no assistance needed  Home safety: no safety concerns identified  Hearing Impairment:: difficulty following a conversation in a noisy restaurant or crowded room, difficulty understanding soft or whispered speech  In the past 6 months, have you been bothered by leaking of urine?: No  abdominal pain: No  Blood in stool: No  Blood in urine: No  chest pain: No  chills: Yes  congestion: No  constipation: No  cough: No  diarrhea: No  dizziness: No  ear pain: No  eye pain: No  nervous/anxious: No  fever: No  frequency: No  genital sores: No  headaches: No  hearing loss: No  heartburn: No  arthralgias: Yes  joint swelling: No  peripheral edema: No  mood changes: No  myalgias: Yes  nausea: No  dysuria: No  palpitations: No  Skin sensation changes: No  sore throat: No  urgency: No  rash: No  shortness of breath: No  visual disturbance: No  weakness: No  pelvic pain: No  vaginal bleeding: No  vaginal discharge: No  tenderness: No  breast mass: No  breast discharge: No  In general, how would you rate your overall mental or emotional health?: good  Additional concerns today:: Yes  Duration of exercise:: 15-30 minutes        "

## 2023-02-17 NOTE — PATIENT INSTRUCTIONS
As discussed , please do fasting labs ordered.     Ordered for DEXA scan       Patient Education   Personalized Prevention Plan  You are due for the preventive services outlined below.  Your care team is available to assist you in scheduling these services.  If you have already completed any of these items, please share that information with your care team to update in your medical record.  Health Maintenance Due   Topic Date Due    Annual Wellness Visit  02/15/2023       Signs of Hearing Loss      Hearing much better with one ear can be a sign of hearing loss.   Hearing loss is a problem shared by many people. In fact, it is one of the most common health problems, particularly as people age. Most people age 65 and older have some hearing loss. By age 80, almost everyone does. Hearing loss often occurs slowly over the years. So you may not realize your hearing has gotten worse.  Have your hearing checked  Call your healthcare provider if you:  Have to strain to hear normal conversation  Have to watch other people s faces very carefully to follow what they re saying  Need to ask people to repeat what they ve said  Often misunderstand what people are saying  Turn the volume of the television or radio up so high that others complain  Feel that people are mumbling when they re talking to you  Find that the effort to hear leaves you feeling tired and irritated  Notice, when using the phone, that you hear better with one ear than the other  [employer] last reviewed this educational content on 1/1/2020 2000-2021 [employer]. All rights reserved. This information is not intended as a substitute for professional medical care. Always follow your healthcare professional's instructions.

## 2023-02-18 VITALS
DIASTOLIC BLOOD PRESSURE: 80 MMHG | WEIGHT: 123 LBS | OXYGEN SATURATION: 99 % | BODY MASS INDEX: 23.22 KG/M2 | SYSTOLIC BLOOD PRESSURE: 130 MMHG | HEART RATE: 83 BPM | TEMPERATURE: 98.1 F | RESPIRATION RATE: 23 BRPM | HEIGHT: 61 IN

## 2023-02-18 LAB — DEPRECATED CALCIDIOL+CALCIFEROL SERPL-MC: 57 UG/L (ref 20–75)

## 2023-02-18 NOTE — RESULT ENCOUNTER NOTE
Your Cholesterol remaining borderline.OK to continue current Lipitor and additional diet modifications. Recommend dietery management of avoiding high fat foods and red meat.    All your OTHER labs normal, you may see some highlighted which do not have Clinical significance.    Please let me know if you have any questions.  Lori Escamilla MD on 2/18/2023 at 4:18 PM  [facility]

## 2023-02-22 ENCOUNTER — TELEPHONE (OUTPATIENT)
Dept: FAMILY MEDICINE | Facility: CLINIC | Age: 73
End: 2023-02-22
Payer: COMMERCIAL

## 2023-02-22 NOTE — TELEPHONE ENCOUNTER
Status: Final result      Visible to patient: Yes (seen)       Patient read PCP lab message.     Will close the encounter.     Kanwal Patel RN  [facility]

## 2023-02-22 NOTE — TELEPHONE ENCOUNTER
----- Message from Lori Escamilla MD sent at 2/18/2023  4:45 PM CST -----  Your Cholesterol remaining borderline.OK to continue current Lipitor and additional diet modifications. Recommend dietery management of avoiding high fat foods and red meat.    All your OTHER labs normal, you may see some highlighted which do not have Clinical significance.    Please let me know if you have any questions.  Lori Escamilla MD on 2/18/2023 at 4:18 PM  [facility]

## 2023-02-23 ENCOUNTER — THERAPY VISIT (OUTPATIENT)
Dept: PHYSICAL THERAPY | Facility: CLINIC | Age: 73
End: 2023-02-23
Payer: COMMERCIAL

## 2023-02-23 ENCOUNTER — LAB (OUTPATIENT)
Dept: LAB | Facility: CLINIC | Age: 73
End: 2023-02-23
Payer: COMMERCIAL

## 2023-02-23 DIAGNOSIS — M25.551 HIP PAIN, RIGHT: Primary | ICD-10-CM

## 2023-02-23 DIAGNOSIS — R10.2 PELVIC PAIN IN FEMALE: ICD-10-CM

## 2023-02-23 LAB
ALBUMIN UR-MCNC: NEGATIVE MG/DL
APPEARANCE UR: CLEAR
BACTERIA #/AREA URNS HPF: ABNORMAL /HPF
BILIRUB UR QL STRIP: NEGATIVE
COLOR UR AUTO: YELLOW
GLUCOSE UR STRIP-MCNC: NEGATIVE MG/DL
HGB UR QL STRIP: NEGATIVE
KETONES UR STRIP-MCNC: NEGATIVE MG/DL
LEUKOCYTE ESTERASE UR QL STRIP: ABNORMAL
NITRATE UR QL: NEGATIVE
PH UR STRIP: 5.5 [PH] (ref 5–7)
RBC #/AREA URNS AUTO: ABNORMAL /HPF
SP GR UR STRIP: <=1.005 (ref 1–1.03)
SQUAMOUS #/AREA URNS AUTO: ABNORMAL /LPF
UROBILINOGEN UR STRIP-ACNC: 0.2 E.U./DL
WBC #/AREA URNS AUTO: ABNORMAL /HPF

## 2023-02-23 PROCEDURE — 81001 URINALYSIS AUTO W/SCOPE: CPT

## 2023-02-23 PROCEDURE — 97110 THERAPEUTIC EXERCISES: CPT | Mod: GP | Performed by: PHYSICAL THERAPIST

## 2023-02-23 ASSESSMENT — ACTIVITIES OF DAILY LIVING (ADL)
STEPPING_UP_AND_DOWN_CURBS: NO DIFFICULTY AT ALL
RECREATIONAL_ACTIVITIES: SLIGHT DIFFICULTY
WALKING_INITIALLY: NO DIFFICULTY AT ALL
HEAVY_WORK: SLIGHT DIFFICULTY
HOS_ADL_ITEM_SCORE_TOTAL: 56
PUTTING_ON_SOCKS_AND_SHOES: NO DIFFICULTY AT ALL
GOING_UP_1_FLIGHT_OF_STAIRS: NO DIFFICULTY AT ALL
HOS_ADL_SCORE(%): 93.33
WALKING_DOWN_STEEP_HILLS: SLIGHT DIFFICULTY
HOW_WOULD_YOU_RATE_YOUR_CURRENT_LEVEL_OF_FUNCTION_DURING_YOUR_USUAL_ACTIVITIES_OF_DAILY_LIVING_FROM_0_TO_100_WITH_100_BEING_YOUR_LEVEL_OF_FUNCTION_PRIOR_TO_YOUR_HIP_PROBLEM_AND_0_BEING_THE_INABILITY_TO_PERFORM_ANY_OF_YOUR_USUAL_DAILY_ACTIVITIES?: 95
SITTING_FOR_15_MINUTES: NO DIFFICULTY AT ALL
GETTING_INTO_AND_OUT_OF_AN_AVERAGE_CAR: NO DIFFICULTY AT ALL
STANDING_FOR_15_MINUTES: NO DIFFICULTY AT ALL
WALKING_UP_STEEP_HILLS: SLIGHT DIFFICULTY
GETTING_INTO_AND_OUT_OF_A_BATHTUB: NO DIFFICULTY AT ALL
LIGHT_TO_MODERATE_WORK: NO DIFFICULTY AT ALL
ROLLING_OVER_IN_BED: NO DIFFICULTY AT ALL
HOS_ADL_COUNT: 15
WALKING_15_MINUTES_OR_GREATER: NO DIFFICULTY AT ALL
HOS_ADL_HIGHEST_POTENTIAL_SCORE: 60
GOING_DOWN_1_FLIGHT_OF_STAIRS: NO DIFFICULTY AT ALL
WALKING_APPROXIMATELY_10_MINUTES: NO DIFFICULTY AT ALL

## 2023-02-23 NOTE — PROGRESS NOTES
PROGRESS  REPORT    Progress reporting period is from 2/2/2023 to 2/23/2023.       SUBJECTIVE  Subjective changes noted by patient: Pt reports that she hasn't had any pain with sitting - however she hasn't sat for more than one hour at a time.  The true test will be flying for 4 hrs when she leave on 2/26/2023 for her trip.  Her HEP is going well.  Changes in function:  Yes (See Goal flowsheet attached for changes in current functional level)  Adverse reaction to treatment or activity: None    OBJECTIVE  Changes noted in objective findings:  Yes, see below.  Objective: MMT B hip ER 5/5, B hip IR 5/5, B knee flex 5/5, B hip abd 5/5, B hip ext 5/5.  PROM B hip flex WNL, B hip IR WNL, B hip ER min loss.     ASSESSMENT/PLAN  Updated problem list and treatment plan: Diagnosis 1:  B hip/glut pain  Pain -  hot/cold therapy  Decreased ROM/flexibility - therapeutic exercise and home program  Impaired muscle performance - neuro re-education and home program  Decreased function - therapeutic activities and home program  Impaired posture - neuro re-education and home program  STG/LTGs have been met or progress has been made towards goals:  Yes (See Goal flow sheet completed today.)  Assessment of Progress: The patient's condition is improving.  The patient's condition has potential to improve.  Self Management Plans:  Patient has been instructed in a home treatment program.  I have re-evaluated this patient and find that the nature, scope, duration and intensity of the therapy is appropriate for the medical condition of the patient.  Dorie continues to require the following intervention to meet STG and LTG's:  Pt will continue with her HEP while in [place] for the next 5 weeks and return for additional PT only if needed.    Recommendations:   Pt will continue with her HEP while in [place] for the next 5 weeks and return for additional PT only if needed.    Please refer to the daily flowsheet for treatment today, total treatment  time and time spent performing 1:1 timed codes.

## 2023-03-04 ENCOUNTER — MYC MEDICAL ADVICE (OUTPATIENT)
Dept: FAMILY MEDICINE | Facility: CLINIC | Age: 73
End: 2023-03-04
Payer: COMMERCIAL

## 2023-03-04 DIAGNOSIS — R10.2 PELVIC PAIN IN FEMALE: Primary | ICD-10-CM

## 2023-04-11 ENCOUNTER — ANCILLARY PROCEDURE (OUTPATIENT)
Dept: ULTRASOUND IMAGING | Facility: CLINIC | Age: 73
End: 2023-04-11
Attending: INTERNAL MEDICINE
Payer: COMMERCIAL

## 2023-04-11 DIAGNOSIS — R10.2 PELVIC PAIN IN FEMALE: ICD-10-CM

## 2023-04-11 PROCEDURE — 76856 US EXAM PELVIC COMPLETE: CPT

## 2023-04-14 ENCOUNTER — OFFICE VISIT (OUTPATIENT)
Dept: FAMILY MEDICINE | Facility: CLINIC | Age: 73
End: 2023-04-14
Payer: COMMERCIAL

## 2023-04-14 VITALS
RESPIRATION RATE: 15 BRPM | WEIGHT: 126 LBS | SYSTOLIC BLOOD PRESSURE: 130 MMHG | HEART RATE: 73 BPM | HEIGHT: 61 IN | TEMPERATURE: 98.1 F | OXYGEN SATURATION: 96 % | BODY MASS INDEX: 23.79 KG/M2 | DIASTOLIC BLOOD PRESSURE: 82 MMHG

## 2023-04-14 DIAGNOSIS — K21.9 GASTROESOPHAGEAL REFLUX DISEASE, UNSPECIFIED WHETHER ESOPHAGITIS PRESENT: ICD-10-CM

## 2023-04-14 DIAGNOSIS — Z92.89: ICD-10-CM

## 2023-04-14 DIAGNOSIS — R10.2 PELVIC PAIN IN FEMALE: Primary | ICD-10-CM

## 2023-04-14 DIAGNOSIS — R82.90 FOUL SMELLING URINE: ICD-10-CM

## 2023-04-14 DIAGNOSIS — D25.9 UTERINE LEIOMYOMA, UNSPECIFIED LOCATION: ICD-10-CM

## 2023-04-14 DIAGNOSIS — K40.90 RIGHT INGUINAL HERNIA: ICD-10-CM

## 2023-04-14 LAB
ALBUMIN UR-MCNC: NEGATIVE MG/DL
APPEARANCE UR: CLEAR
BILIRUB UR QL STRIP: NEGATIVE
COLOR UR AUTO: YELLOW
GLUCOSE UR STRIP-MCNC: NEGATIVE MG/DL
HGB UR QL STRIP: NEGATIVE
KETONES UR STRIP-MCNC: NEGATIVE MG/DL
LEUKOCYTE ESTERASE UR QL STRIP: NEGATIVE
NITRATE UR QL: NEGATIVE
PH UR STRIP: 6 [PH] (ref 5–7)
RBC #/AREA URNS AUTO: NORMAL /HPF
SP GR UR STRIP: <=1.005 (ref 1–1.03)
UROBILINOGEN UR STRIP-ACNC: 0.2 E.U./DL
WBC #/AREA URNS AUTO: NORMAL /HPF

## 2023-04-14 PROCEDURE — 99214 OFFICE O/P EST MOD 30 MIN: CPT | Performed by: INTERNAL MEDICINE

## 2023-04-14 PROCEDURE — 81001 URINALYSIS AUTO W/SCOPE: CPT | Performed by: INTERNAL MEDICINE

## 2023-04-14 NOTE — PATIENT INSTRUCTIONS
As discussed with you in your office visit today with ongoing pelvic pain, urine exam came back as normal.  Ultrasound was indeterminate on ovarian pathologies as it was not visualized properly.  I went ahead and placed a CT scan of her abdomen pelvis for further work-up on this.

## 2023-04-14 NOTE — PROGRESS NOTES
Assessment and Plan    1. Pelvic pain in female  2. H/O pelvic ultrasound  Ongoing problem, uncontrolled.  Discussed on differential diagnosis of UTI which was negative on the urinalysis done today, previously done transvaginal ultrasound which was indecisive showing uterus was normal but ovaries were not visualized.  Most part of this appointment discussed on possible ovarian pathologies need to be excluded by checking a CT abdominal pelvis which patient is opting to do.  -Also discussed on colonic pathologies but recent colonoscopy normal good for 5 years.  -Placed orders for CT abdomen pelvis for further work-up.  - CT Abdomen Pelvis w/o Contrast; Future    3. Foul smelling urine  Ongoing problem with recent [place] travel which patient is concerned.  UA checked which was negative for UTI.  - UA with Microscopic reflex to Culture - lab collect; Future  - UA with Microscopic reflex to Culture - lab collect  - UA Microscopic with Reflex to Culture    4. Gastroesophageal reflux disease, unspecified whether esophagitis present  Chronic problem, intermittent flareup of abdominal pain.  Will increase the dose of omeprazole to 20 mg twice daily.  - omeprazole (PRILOSEC) 20 MG DR capsule; Take 1 capsule (20 mg) by mouth 2 times daily  Dispense: 180 capsule; Refill: 1      UPDATE -   CT abdomen is positive for Calcified Uterine fibroids and small Rt inguinal hernia which is possibly causing your symptoms . Placed referral to OBGYN and general Surgery respectively for recommendations on this    5. Uterine leiomyoma, unspecified location  - Ob/Gyn Referral    6. Right inguinal hernia  - Adult General Surg Referral; Future       Patient Instructions   As discussed with you in your office visit today with ongoing pelvic pain, urine exam came back as normal.  Ultrasound was indeterminate on ovarian pathologies as it was not visualized properly.  I went ahead and placed a CT scan of her abdomen pelvis for further work-up on  this.    Return in about 3 months (around 7/14/2023), or if symptoms worsen or fail to improve, for Follow up of last visit, If symptoms persist.    Lori Escamilla MD  [facility] SHAMIKA Oshea is a 73 year old, presenting for the following health issues:  RECHECK (Abdominal discomfort)        4/14/2023     9:13 AM   Additional Questions   Roomed by Sujatha Koroma     History of Present Illness       Reason for visit:  Lower abdominal disconfort    She eats 2-3 servings of fruits and vegetables daily.She consumes 0 sweetened beverage(s) daily.She exercises with enough effort to increase her heart rate 30 to 60 minutes per day.    She is taking medications regularly.     Last seen patient on February 2023 for annual wellness visit, she did have pelvic pain at that time for which UA was considered which was negative.  Went ahead and placed pelvic ultrasound at that time which does states that her right and left ovaries were not visualized well.  Plans of CT abdomen pelvis if patient continues to have this pelvic pain.     No Known Allergies     Past Medical History:   Diagnosis Date     Allergy to cats     nasal symptoms     Arthritis 2015    x-ray     Family history of ischemic heart disease     parents and brother with CAD onset age 60s     Hidradenitis     recurrent, groin     Influenza with other manifestations 1972    hospitalized     Menopause 1993    age 43     Mixed hyperlipidemia      Osteopenia 2003 2014 FRAX: 12% risk major osteoporotic fx, 2.2% risk hip fx     Status post bunionectomy 05/2012    right with hammertoe repairs       Past Surgical History:   Procedure Laterality Date     BUNIONECTOMY Left 4/24/2019    Procedure: LEFT HALLIX VAGUS AND SECOND CLAWTOE RECONSTRUCTION;  Surgeon: Tyler Jameson MD;  Location:  OR     BUNIONECTOMY [place]  5/14/2012    Procedure:BUNIONECTOMY KATIE; RIGHT FOOT EULA KATIE BUNIONECTOMY, HAMMER TOE RECONSTRUCTION  DIGITS TWO, THREE, FOUR AND FIVE; Surgeon:DEANDRE MONTE; Location: [facility]     C DEXA INTERPRETATION, AXIAL  2003    T score femur -1.3, lumbar -1.0     C DEXA INTERPRETATION, AXIAL  2004    T score lumbar -1.2, femoral -1.4     C DEXA INTERPRETATION, AXIAL  2007    T score lumbar -1.5 (decline -2.8%/yr), femoral neck -1.3/-1.8 (decline -1.8%/yr)     COLONOSCOPY  1993     COLONOSCOPY  2000    normal, repeat 10 years     COLONOSCOPY  10/2010    normal, repeat 5 years     DEXA  10/2010    T score lumbar -1.2(stable), femoral neck -1.8/-2.2 with BMD 0.755 (6% worsening)     HC TOOTH EXTRACTION W/FORCEP      wisdom teeth extracted     REPAIR HAMMER TOE  2012    Procedure:REPAIR HAMMER TOE; Surgeon:DEANDRE MONTE; Location: [facility]     STRESS ECHO  10/2010    normal LV function at rest, hyperdynamic with exercise, EF 55-60% at rest and 70% post exercise     STRESS ECHO  10/2010    negative for echo/EKG changes of ischemia, normal LV fct with EF 55-60%, systolic fct hyperdynamic with exercise, exercised 7 min, EK.5 mm upsloping ST depression inf lead     STRESS ECHO (METRO)  2004    negative, exercise 8-9 min     ZZC DEXA INTERPRETATION, AXIAL  2014    T score L1-4 of -1.4 (dec 2.7%), femoral neck -1.9/-2.2 with BMD 0.728 (dec 0.8%)       Family History   Problem Relation Age of Onset     C.A.D. Father         MI age 61,   age 76 from MI     Diabetes Father      Cancer Father         esophagus, former smoker     Obesity Father      Coronary Artery Disease Father              Other Cancer Father      C.A.D. Mother         MI age 62     Lipids Mother      Blood Disease Mother         pernicious anemia     Thyroid Disease Mother      Eye Disorder Mother         macular degeneration     Osteoporosis Mother      Connective Tissue Disorder Mother         SLE - remission     Hypertension Mother      Hyperlipidemia Mother      Asthma Mother      Dementia Mother         memory loss, onset  early 90s     Coronary Artery Disease Mother              C.A.D. Maternal Grandmother          early 80s     C.A.D. Maternal Grandfather          at younger age     C.A.D. Paternal Grandmother          late 70s     Hypertension Brother      Prostate Cancer Brother      Colon Polyps Brother         sister may also have colon polyps     Coronary Artery Disease Brother         angioplasty     Asthma Brother      Obesity Brother         No longer obese     Arrhythmia Brother      Hyperlipidemia Sister      Osteoporosis Sister         she has osteopenia     Colon Polyps Sister         possible       Social History     Tobacco Use     Smoking status: Never     Smokeless tobacco: Never   Vaping Use     Vaping status: Never Used   Substance Use Topics     Alcohol use: Not Currently     Comment: Wine occasionally        Current Outpatient Medications   Medication     omeprazole (PRILOSEC) 20 MG DR capsule     atorvastatin (LIPITOR) 20 MG tablet     Calcium-Vitamin D-Vitamin K 500-1000-40 MG-UNT-MCG CHEW     carboxymethylcellulose PF (REFRESH PLUS) 0.5 % SOLN ophthalmic solution     Coenzyme Q10-Vitamin E (QUNOL ULTRA COQ10 PO)     cyanocobalamin (VITAMIN B-12) 1000 MCG tablet     fish oil-omega-3 fatty acids 1000 MG capsule     FLUZONE HIGH-DOSE QUADRIVALENT 0.7 ML GUNNAR injection     melatonin 1 MG TABS     Multiple Vitamins-Minerals (PRESERVISION AREDS 2) CHEW     nifedipine 0.2% in white petrolatum 0.2 % OINT ointment     nifedipine 0.2% in white petrolatum 0.2 % OINT ointment     polyethylene glycol 0.4%- propylene glycol 0.3% (SYSTANE ULTRA) 0.4-0.3 % SOLN ophthalmic solution     Probiotic Product (PROBIOTIC-10) CAPS     Psyllium (METAMUCIL) 0.36 g CAPS     No current facility-administered medications for this visit.        Review of Systems   Constitutional, HEENT, cardiovascular, pulmonary, GI, , musculoskeletal, neuro, skin, endocrine and psych systems are negative, except as otherwise noted.     "  Objective    /82   Pulse 73   Temp 98.1  F (36.7  C) (Temporal)   Resp 15   Ht 1.549 m (5' 1\")   Wt 57.2 kg (126 lb)   SpO2 96%   BMI 23.81 kg/m    Body mass index is 23.81 kg/m .  Physical Exam   GENERAL: healthy, alert and no distress  NECK: no adenopathy, no asymmetry, masses, or scars and thyroid normal to palpation  RESP: lungs clear to auscultation - no rales, rhonchi or wheezes  CV: regular rate and rhythm, normal S1 S2, no S3 or S4, no murmur, click or rub, no peripheral edema and peripheral pulses strong  ABDOMEN: soft, positive for tenderness on palpation of bilateral iliac fossae and suprapubic region., no hepatosplenomegaly, no masses and bowel sounds normal  MS: no gross musculoskeletal defects noted, no edema    Labs and imaging reviewed and discussed with the patient.      "

## 2023-04-15 ENCOUNTER — NURSE TRIAGE (OUTPATIENT)
Dept: NURSING | Facility: CLINIC | Age: 73
End: 2023-04-15
Payer: COMMERCIAL

## 2023-04-15 NOTE — TELEPHONE ENCOUNTER
Patient was in yesterday with Andi Sandoval MD.  Patient is calling today about a prescription for Omeprazole was sent to [facility] on [address].  Patient is requesting that all her medications be sent to [facility] pharmacy on [address].  [facility]      Reason for Disposition    Caller has medicine question only, adult not sick, AND triager answers question    Additional Information    Negative: [1] Intentional drug overdose AND [2] suicidal thoughts or ideas    Negative: MORE THAN A DOUBLE DOSE of a prescription or over-the-counter (OTC) drug    Negative: [1] DOUBLE DOSE (an extra dose or lesser amount) of prescription drug AND [2] any symptoms (e.g., dizziness, nausea, pain, sleepiness)    Negative: [1] DOUBLE DOSE (an extra dose or lesser amount) of over-the-counter (OTC) drug AND [2] any symptoms (e.g., dizziness, nausea, pain, sleepiness)    Negative: Took another person's prescription drug    Negative: [1] DOUBLE DOSE (an extra dose or lesser amount) of prescription drug AND [2] NO symptoms (Exception: a double dose of antibiotics)    Negative: Diabetes drug error or overdose (e.g., took wrong type of insulin or took extra dose)    Negative: [1] Prescription not at pharmacy AND [2] was prescribed by PCP recently (Exception: triager has access to EMR and prescription is recorded there. Go to Home Care and confirm for pharmacy.)    Negative: [1] Pharmacy calling with prescription question AND [2] triager unable to answer question    Negative: [1] Caller has URGENT medicine question about med that PCP or specialist prescribed AND [2] triager unable to answer question    Negative: Medicine patch causing local rash or itching    Negative: [1] Caller has medicine question about med NOT prescribed by PCP AND [2] triager unable to answer question (e.g., compatibility with other med, storage)    Negative: Prescription request for new medicine (not a refill)     Negative: [1] Caller has NON-URGENT medicine question about med that PCP prescribed AND [2] triager unable to answer question    Negative: Caller wants to use a complementary or alternative medicine    Negative: [1] Prescription prescribed recently is not at pharmacy AND [2] triager has access to patient's EMR AND [3] prescription is recorded in the EMR    Negative: [1] DOUBLE DOSE (an extra dose or lesser amount) of over-the-counter (OTC) drug AND [2] NO symptoms    Negative: [1] DOUBLE DOSE (an extra dose or lesser amount) of antibiotic drug AND [2] NO symptoms    Protocols used: MEDICATION QUESTION CALL-A-AH

## 2023-04-17 NOTE — TELEPHONE ENCOUNTER
S/w pt who states she has already contacted [facility] and had medication transferred over. No further action needed.    Temitope GUERRERO RN  [facility] Triage Team

## 2023-04-20 ENCOUNTER — ANCILLARY PROCEDURE (OUTPATIENT)
Dept: CT IMAGING | Facility: CLINIC | Age: 73
End: 2023-04-20
Attending: INTERNAL MEDICINE
Payer: COMMERCIAL

## 2023-04-20 DIAGNOSIS — Z92.89: ICD-10-CM

## 2023-04-20 DIAGNOSIS — R10.2 PELVIC PAIN IN FEMALE: ICD-10-CM

## 2023-04-20 PROCEDURE — 74176 CT ABD & PELVIS W/O CONTRAST: CPT

## 2023-04-21 ENCOUNTER — TELEPHONE (OUTPATIENT)
Dept: FAMILY MEDICINE | Facility: CLINIC | Age: 73
End: 2023-04-21
Payer: COMMERCIAL

## 2023-04-21 NOTE — TELEPHONE ENCOUNTER
Called and spoke to the patient. Relayed message from the provider. Patient states she already has an appointment scheduled with OBGYN. Phone number provided to the patient to call and get an appointment scheduled with General Surgery. Patient expressed understanding and had no additional questions at this time.    Lizy Mason RN

## 2023-04-21 NOTE — TELEPHONE ENCOUNTER
----- Message from Lori Escamilla MD sent at 4/20/2023 10:16 PM CDT -----  Your CT abdomen is positive for Calcified Uterine fibroids and small Rt inguinal hernia which is possibly causing your symptoms . I have placed referral to OBGYN for recommendations on this. Please keep up your appointment with them.

## 2023-04-26 ENCOUNTER — OFFICE VISIT (OUTPATIENT)
Dept: SURGERY | Facility: CLINIC | Age: 73
End: 2023-04-26
Payer: COMMERCIAL

## 2023-04-26 VITALS
SYSTOLIC BLOOD PRESSURE: 146 MMHG | BODY MASS INDEX: 23.79 KG/M2 | WEIGHT: 126 LBS | HEART RATE: 64 BPM | HEIGHT: 61 IN | DIASTOLIC BLOOD PRESSURE: 62 MMHG

## 2023-04-26 DIAGNOSIS — K40.90 RIGHT INGUINAL HERNIA: Primary | ICD-10-CM

## 2023-04-26 PROCEDURE — 99204 OFFICE O/P NEW MOD 45 MIN: CPT | Performed by: STUDENT IN AN ORGANIZED HEALTH CARE EDUCATION/TRAINING PROGRAM

## 2023-04-26 NOTE — PROGRESS NOTES
[facility] Consultation    CHIEF COMPLAINT:  Chief Complaint   Patient presents with     Consult     [facility]        HISTORY OF PRESENT ILLNESS:  Dorie Gagnon is a 73 year old female who is seen in consultation at the request of Dr. Escamilla for evaluation of right inguinal hernia.     She reports that a couple months ago she started noticing some buttock pain with sitting.  Over a period of days the pain progressed to her pelvis and anterior lower abdomen.  The pain she describes is more of a discomfort with associated tingling and burning.  She denies any active numbness.  It can occur throughout her lower abdomen and is not particularly on the right or left side.  Feels that anywhere she puts pressure on is where this discomfort will occur.  She denies any nausea, vomiting, trouble eating or significant trouble with bowel movements.  She has daily bowel movements as long as she takes her stool softeners and laxatives.  She denies any prior abdominal surgeries.  She denies any personal family history of issues with bleeding or general anesthesia.    REVIEW OF SYSTEMS:  Constitutional: No fevers or chills  Eyes: No blurred or double vision  HENT: Denies headaches, No rhinorrhea, No sore throat  Respiratory: No cough or shortness of breath  Cardiovascular: Denies chest pain or palpitations  Gastrointestinal: No abdominal pain or nausea/vomiting  Genitourinary: No hematuria or dysuria  Musculoskeletal: Denies arthralgias or myalgias  Neurologic: No numbness or tingling  Integumentary: No skin rashes    Past Medical History:   Diagnosis Date     Allergy to cats     nasal symptoms     Arthritis 2015    x-ray     Family history of ischemic heart disease     parents and brother with CAD onset age 60s     Hidradenitis     recurrent, groin     Influenza with other manifestations 1972    hospitalized     Menopause 1993    age 43     Mixed hyperlipidemia      Osteopenia 2003 2014 FRAX: 12% risk major  osteoporotic fx, 2.2% risk hip fx     Status post bunionectomy 2012    right with hammertoe repairs       Past Surgical History:   Procedure Laterality Date     BUNIONECTOMY Left 2019    Procedure: LEFT HALLIX VAGUS AND SECOND CLAWTOE RECONSTRUCTION;  Surgeon: Tyler Jameson MD;  Location:  OR     BUNIONECTOMY KATIE  2012    Procedure:BUNIONECTOMY KATIE; RIGHT FOOT EULA KATIE BUNIONECTOMY, HAMMER TOE RECONSTRUCTION DIGITS TWO, THREE, FOUR AND FIVE; Surgeon:DEANDRE MONTE; Location: [facility]     C DEXA INTERPRETATION, AXIAL  2003    T score femur -1.3, lumbar -1.0     C DEXA INTERPRETATION, AXIAL  2004    T score lumbar -1.2, femoral -1.4     C DEXA INTERPRETATION, AXIAL  2007    T score lumbar -1.5 (decline -2.8%/yr), femoral neck -1.3/-1.8 (decline -1.8%/yr)     COLONOSCOPY       COLONOSCOPY  2000    normal, repeat 10 years     COLONOSCOPY  10/2010    normal, repeat 5 years     DEXA  10/2010    T score lumbar -1.2(stable), femoral neck -1.8/-2.2 with BMD 0.755 (6% worsening)     HC TOOTH EXTRACTION W/FORCEP      wisdom teeth extracted     REPAIR HAMMER TOE  2012    Procedure:REPAIR HAMMER TOE; Surgeon:DEANDRE MONTE; Location:[facility]     STRESS ECHO  10/2010    normal LV function at rest, hyperdynamic with exercise, EF 55-60% at rest and 70% post exercise     STRESS ECHO  10/2010    negative for echo/EKG changes of ischemia, normal LV fct with EF 55-60%, systolic fct hyperdynamic with exercise, exercised 7 min, EK.5 mm upsloping ST depression inf lead     STRESS ECHO (METRO)  2004    negative, exercise 8-9 min     [facility] DEXA INTERPRETATION, AXIAL  2014    T score L1-4 of -1.4 (dec 2.7%), femoral neck -1.9/-2.2 with BMD 0.728 (dec 0.8%)       Family History   Problem Relation Age of Onset     C.A.D. Father         MI age 61,   age 76 from MI     Diabetes Father      Cancer Father         esophagus, former smoker     Obesity Father      Coronary Artery Disease  Father              Other Cancer Father      C.A.D. Mother         MI age 62     Lipids Mother      Blood Disease Mother         pernicious anemia     Thyroid Disease Mother      Eye Disorder Mother         macular degeneration     Osteoporosis Mother      Connective Tissue Disorder Mother         SLE - remission     Hypertension Mother      Hyperlipidemia Mother      Asthma Mother      Dementia Mother         memory loss, onset early 90s     Coronary Artery Disease Mother              C.A.D. Maternal Grandmother          early 80s     C.A.D. Maternal Grandfather          at younger age     C.A.D. Paternal Grandmother          late 70s     Hypertension Brother      Prostate Cancer Brother      Colon Polyps Brother         sister may also have colon polyps     Coronary Artery Disease Brother         angioplasty     Asthma Brother      Obesity Brother         No longer obese     Arrhythmia Brother      Hyperlipidemia Sister      Osteoporosis Sister         she has osteopenia     Colon Polyps Sister         possible       Social History     Tobacco Use     Smoking status: Never     Smokeless tobacco: Never   Vaping Use     Vaping status: Never Used   Substance Use Topics     Alcohol use: Not Currently     Comment: Wine occasionally       Patient Active Problem List   Diagnosis     Family history of ischemic heart disease     Osteopenia     Hidradenitis     Hyperlipidemia LDL goal <130     Advanced directives, counseling/discussion     Family history of polyps in the colon     Palpitations     PVC's (premature ventricular contractions)     Hallux valgus of left foot     Chronic cough     Gastroesophageal reflux disease, unspecified whether esophagitis present     Pain of right hand     Numbness and tingling in right hand     Seasonal allergic rhinitis due to other allergic trigger     H. 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)       No Known Allergies    Current Outpatient Medications   Medication Sig Dispense Refill     atorvastatin (LIPITOR) 20 MG tablet Take 1 tablet (20 mg) by mouth daily 90 tablet 1     Calcium-Vitamin D-Vitamin K 500-1000-40 MG-UNT-MCG CHEW Take 2 chew tab by mouth 2 times daily (Gummie)       carboxymethylcellulose PF (REFRESH PLUS) 0.5 % SOLN ophthalmic solution Place 1-2 drops into both eyes At Bedtime       Coenzyme Q10-Vitamin E (QUNOL ULTRA COQ10 PO) Take 2 teaspoonful by mouth daily       cyanocobalamin (VITAMIN B-12) 1000 MCG tablet Take 1,000 mcg by mouth daily 2500 mcg day       fish oil-omega-3 fatty acids 1000 MG capsule Take 2 g by mouth daily 1600 mg daily       FLUZONE HIGH-DOSE QUADRIVALENT 0.7 ML GUNNAR injection        melatonin 1 MG TABS Take 2 mg by mouth At Bedtime        Multiple Vitamins-Minerals (PRESERVISION AREDS 2) CHEW        nifedipine 0.2% in white petrolatum 0.2 % OINT ointment APPLY TOPICALLY TWICE DAILY AS DIRECTED       nifedipine 0.2% in white petrolatum 0.2 % OINT ointment Apply topically 2 times daily 100 g 0     omeprazole (PRILOSEC) 20 MG DR capsule Take 1 capsule (20 mg) by mouth 2 times daily 180 capsule 1     polyethylene glycol 0.4%- propylene glycol 0.3% (SYSTANE ULTRA) 0.4-0.3 % SOLN ophthalmic solution Place 1-2 drops into both eyes daily as needed for dry eyes       Probiotic Product (PROBIOTIC-10) CAPS Take 1 capsule by mouth daily       Psyllium (METAMUCIL) 0.36 g CAPS          Vitals: BP (!) 146/62   Pulse 64   Ht 1.549 m (5' 1\")   Wt 57.2 kg (126 lb)   BMI 23.81 kg/m    BMI= Body mass index is 23.81 kg/m .    EXAM:  General: Vital signs reviewed, in no apparent distress  Eyes: Anicteric  HENT: Normocephalic, atraumatic, trachea midline   Respiratory: Breathing nonlabored  Cardiovascular: Regular rate and rhythm  GI: Abdomen soft, nondistended, nontender. Right groin -unable to palpate an inguinal bulge.  Left groin -unable to palpate an inguinal bulge.  "   Musculoskeletal: No gross deformities  Neurologic: Grossly nonfocal exam  Psychiatric: Normal mood, affect and insight  Integumentary: Warm and dry    All labs and imaging personally reviewed and significant for:     CT scan reviewed and significant for a very small right inguinal hernia with fat protrusion.    ASSESSMENT:  Dorie Gagnon is a 73 year old who presents with right inguinal hernia on CT scan. Pathophysiology and pertinent imaging were reviewed with the patient.  We extensively discussed that likely her symptoms are not related to her hernia as her symptoms are more generalized.  I did discuss with her that fixing this hernia may not improve her symptoms but due to her risk of incarceration surgical management is often recommended   Though not an emergency at this time.  If she were to proceed with surgery, I recommend we proceed with robotic inguinal hernia repair with permanent mesh.  I discussed with her that I will examine the other side for any evidence of hernia and repair the other side at the same time. Risk and benefits of the surgery include but is not limited to pain, bleeding, infection, seroma/hematoma formation, possible injury to surrounding structures and organs, and recurrence.  Perioperative course and postop management was also discussed with the patient.  Patient was given ample amount of time to ask questions and all questions were answered appropriately.  Patient is electing to proceed with surgery. Significant pertinent co-morbidities include: Hyperlipidemia, PVCs, GERD    PLAN:  Robotic right inguinal hernia repair    It was my pleasure to participate in the care of Dorie Gagnon in clinic today. Thank you for this consultation.         Wyatt Washington MD    Please route or send letter to:  Primary Care Provider (PCP) and Referring Provider

## 2023-04-27 ENCOUNTER — TELEPHONE (OUTPATIENT)
Dept: SURGERY | Facility: CLINIC | Age: 73
End: 2023-04-27
Payer: COMMERCIAL

## 2023-04-27 ENCOUNTER — HOSPITAL ENCOUNTER (OUTPATIENT)
Facility: CLINIC | Age: 73
End: 2023-04-27
Attending: STUDENT IN AN ORGANIZED HEALTH CARE EDUCATION/TRAINING PROGRAM | Admitting: STUDENT IN AN ORGANIZED HEALTH CARE EDUCATION/TRAINING PROGRAM
Payer: COMMERCIAL

## 2023-04-27 RX ORDER — CEFAZOLIN SODIUM/WATER 2 G/20 ML
2 SYRINGE (ML) INTRAVENOUS
Status: CANCELLED | OUTPATIENT
Start: 2023-04-27

## 2023-04-27 RX ORDER — CEFAZOLIN SODIUM/WATER 2 G/20 ML
2 SYRINGE (ML) INTRAVENOUS SEE ADMIN INSTRUCTIONS
Status: CANCELLED | OUTPATIENT
Start: 2023-04-27

## 2023-04-27 NOTE — TELEPHONE ENCOUNTER
Type of surgery: robot assisted right inguinal hernia repair possible bilateral  Location of surgery: [place]  Date and time of surgery: 6/14/23 12:10pm  Surgeon: Dr Washington  Pre-Op Appt Date: pt to schedule  Post-Op Appt Date: pt to schedule   Packet sent out: Yes  Pre-cert/Authorization completed:  Not Applicable  Date: 4/27/23

## 2023-05-12 DIAGNOSIS — E78.5 HYPERLIPIDEMIA LDL GOAL <130: ICD-10-CM

## 2023-05-12 RX ORDER — ATORVASTATIN CALCIUM 20 MG/1
TABLET, FILM COATED ORAL
Qty: 90 TABLET | Refills: 2 | Status: SHIPPED | OUTPATIENT
Start: 2023-05-12 | End: 2024-01-26

## 2023-05-25 ENCOUNTER — TELEPHONE (OUTPATIENT)
Dept: SURGERY | Facility: CLINIC | Age: 73
End: 2023-05-25
Payer: COMMERCIAL

## 2023-05-25 NOTE — TELEPHONE ENCOUNTER
Name of caller: Patient    Reason for Call:  Pt is scheduled for robot assisted right inguinal hernia repair, poss bilateral on 6/14/23 with Dr Washington, has questions about the recovery    Surgeon:  Dr. Washington    Recent Surgery:  No    If yes, when & what type:  N/A      Best phone number to reach pt at is: [phone]  Ok to leave a message with medical info? Yes.    Pharmacy preferred (if calling for a refill): na

## 2023-08-21 ENCOUNTER — TRANSFERRED RECORDS (OUTPATIENT)
Dept: HEALTH INFORMATION MANAGEMENT | Facility: CLINIC | Age: 73
End: 2023-08-21
Payer: COMMERCIAL

## 2023-10-18 DIAGNOSIS — K21.9 GASTROESOPHAGEAL REFLUX DISEASE, UNSPECIFIED WHETHER ESOPHAGITIS PRESENT: ICD-10-CM

## 2023-12-08 ENCOUNTER — TELEPHONE (OUTPATIENT)
Dept: FAMILY MEDICINE | Facility: CLINIC | Age: 73
End: 2023-12-08
Payer: COMMERCIAL

## 2023-12-11 ENCOUNTER — OFFICE VISIT (OUTPATIENT)
Dept: INTERNAL MEDICINE | Facility: CLINIC | Age: 73
End: 2023-12-11
Payer: COMMERCIAL

## 2023-12-11 VITALS
BODY MASS INDEX: 23.41 KG/M2 | HEIGHT: 61 IN | HEART RATE: 87 BPM | SYSTOLIC BLOOD PRESSURE: 142 MMHG | OXYGEN SATURATION: 98 % | WEIGHT: 124 LBS | RESPIRATION RATE: 16 BRPM | DIASTOLIC BLOOD PRESSURE: 84 MMHG

## 2023-12-11 DIAGNOSIS — S82.891A ANKLE FRACTURE, RIGHT, CLOSED, INITIAL ENCOUNTER: ICD-10-CM

## 2023-12-11 DIAGNOSIS — Z01.818 PREOP GENERAL PHYSICAL EXAM: Primary | ICD-10-CM

## 2023-12-11 DIAGNOSIS — E78.5 HYPERLIPIDEMIA LDL GOAL <130: ICD-10-CM

## 2023-12-11 PROCEDURE — 93000 ELECTROCARDIOGRAM COMPLETE: CPT | Performed by: PHYSICIAN ASSISTANT

## 2023-12-11 PROCEDURE — 99214 OFFICE O/P EST MOD 30 MIN: CPT | Mod: 25 | Performed by: PHYSICIAN ASSISTANT

## 2023-12-11 NOTE — PATIENT INSTRUCTIONS
Preparing for Your Surgery  Getting started  A nurse will call you to review your health history and instructions. They will give you an arrival time based on your scheduled surgery time. Please be ready to share:  Your doctor's clinic name and phone number  Your medical, surgical, and anesthesia history  A list of allergies and sensitivities  A list of medicines, including herbal treatments and over-the-counter drugs  Whether the patient has a legal guardian (ask how to send us the papers in advance)  Please tell us if you're pregnant--or if there's any chance you might be pregnant. Some surgeries may injure a fetus (unborn baby), so they require a pregnancy test. Surgeries that are safe for a fetus don't always need a test, and you can choose whether to have one.   If you have a child who's having surgery, please ask for a copy of Preparing for Your Child's Surgery.    Preparing for surgery  Within 10 to 30 days of surgery: Have a pre-op exam (sometimes called an H&P, or History and Physical). This can be done at a clinic or pre-operative center.  If you're having a , you may not need this exam. Talk to your care team.  At your pre-op exam, talk to your care team about all medicines you take. If you need to stop any medicines before surgery, ask when to start taking them again.  We do this for your safety. Many medicines can make you bleed too much during surgery. Some change how well surgery (anesthesia) drugs work.  Call your insurance company to let them know you're having surgery. (If you don't have insurance, call [phone].)  Call your clinic if there's any change in your health. This includes signs of a cold or flu (sore throat, runny nose, cough, rash, fever). It also includes a scrape or scratch near the surgery site.  If you have questions on the day of surgery, call your hospital or surgery center.  Eating and drinking guidelines  For your safety: Unless your surgeon tells you otherwise,  follow the guidelines below.  Eat and drink as usual until 8 hours before you arrive for surgery. After that, no food or milk.  Drink clear liquids until 2 hours before you arrive. These are liquids you can see through, like water, Gatorade, and Propel Water. They also include plain black coffee and tea (no cream or milk), candy, and breath mints. You can spit out gum when you arrive.  If you drink alcohol: Stop drinking it the night before surgery.  If your care team tells you to take medicine on the morning of surgery, it's okay to take it with a sip of water.  Preventing infection  Shower or bathe the night before and morning of your surgery. Follow the instructions your clinic gave you. (If no instructions, use regular soap.)  Don't shave or clip hair near your surgery site. We'll remove the hair if needed.  Don't smoke or vape the morning of surgery. You may chew nicotine gum up to 2 hours before surgery. A nicotine patch is okay.  Note: Some surgeries require you to completely quit smoking and nicotine. Check with your surgeon.  Your care team will make every effort to keep you safe from infection. We will:  Clean our hands often with soap and water (or an alcohol-based hand rub).  Clean the skin at your surgery site with a special soap that kills germs.  Give you a special gown to keep you warm. (Cold raises the risk of infection.)  Wear special hair covers, masks, gowns and gloves during surgery.  Give antibiotic medicine, if prescribed. Not all surgeries need antibiotics.  What to bring on the day of surgery  Photo ID and insurance card  Copy of your health care directive, if you have one  Glasses and hearing aids (bring cases)  You can't wear contacts during surgery  Inhaler and eye drops, if you use them (tell us about these when you arrive)  CPAP machine or breathing device, if you use them  A few personal items, if spending the night  If you have . . .  A pacemaker, ICD (cardiac defibrillator) or other  implant: Bring the ID card.  An implanted stimulator: Bring the remote control.  A legal guardian: Bring a copy of the certified (court-stamped) guardianship papers.  Please remove any jewelry, including body piercings. Leave jewelry and other valuables at home.  If you're going home the day of surgery  You must have a responsible adult drive you home. They should stay with you overnight as well.  If you don't have someone to stay with you, and you aren't safe to go home alone, we may keep you overnight. Insurance often won't pay for this.  After surgery  If it's hard to control your pain or you need more pain medicine, please call your surgeon's office.  Questions?   If you have any questions for your care team, list them here: _________________________________________________________________________________________________________________________________________________________________________ ____________________________________ ____________________________________ ____________________________________  For informational purposes only. Not to replace the advice of your health care provider. Copyright   2003, 2019 [employer] [facility]. All rights reserved. Clinically reviewed by Naty Smith MD. [employer] 720791 - REV 12/22.    How to Take Your Medication Before Surgery  - Take all of your medications before surgery as usual  - STOP taking all vitamins and herbal supplements 14 days before surgery.

## 2023-12-11 NOTE — PROGRESS NOTES
[address] [address]  Phone: [phone]  Primary Provider: Lori Escamilla  Pre-op Performing Provider: BARBI EVANS      PREOPERATIVE EVALUATION:  Today's date: 12/11/2023    Dorie is a 73 year old, presenting for the following:  Pre-Op Exam        Surgical Information:  Surgery/Procedure: Right ankle   Surgery Location: [facility]  Surgeon: Dr Qureshi  Surgery Date: 12/12/2023  Time of Surgery: 11am  Where patient plans to recover: At home with family  Fax number for surgical facility: [phone]    Assessment & Plan     The proposed surgical procedure is considered INTERMEDIATE risk.    Preop general physical exam    - EKG 12-lead complete w/read - Clinics    Ankle fracture, right, closed, initial encounter    - EKG 12-lead complete w/read - Clinics    Hyperlipidemia LDL goal <130    - EKG 12-lead complete w/read - Clinics            - No identified additional risk factors other than previously addressed    Antiplatelet or Anticoagulation Medication Instructions:   - Patient is on no antiplatelet or anticoagulation medications.    Additional Medication Instructions:  Patient is to take all scheduled medications on the day of surgery EXCEPT for modifications listed below:  Hold all vitamins and supplements  Take statin     RECOMMENDATION:  APPROVAL GIVEN to proceed with proposed procedure, without further diagnostic evaluation.            Subjective       HPI related to upcoming procedure: right ankle         12/11/2023     1:03 PM   Preop Questions   1. Have you ever had a heart attack or stroke? No   2. Have you ever had surgery on your heart or blood vessels, such as a stent placement, a coronary artery bypass, or surgery on an artery in your head, neck, heart, or legs? No   3. Do you have chest pain with activity? No   4. Do you have a history of  heart failure? No   5. Do you currently have a cold, bronchitis or  symptoms of other infection? No   6. Do you have a cough, shortness of breath, or wheezing? No   7. Do you or anyone in your family have previous history of blood clots? YES - family hx dad   8. Do you or does anyone in your family have a serious bleeding problem such as prolonged bleeding following surgeries or cuts? No   9. Have you ever had problems with anemia or been told to take iron pills? No   10. Have you had any abnormal blood loss such as black, tarry or bloody stools, or abnormal vaginal bleeding? No   11. Have you ever had a blood transfusion? No   12. Are you willing to have a blood transfusion if it is medically needed before, during, or after your surgery? Yes   13. Have you or any of your relatives ever had problems with anesthesia? No   14. Do you have sleep apnea, excessive snoring or daytime drowsiness? No   15. Do you have any artifical heart valves or other implanted medical devices like a pacemaker, defibrillator, or continuous glucose monitor? No   16. Do you have artificial joints? No   17. Are you allergic to latex? No       Health Care Directive:  Patient does not have a Health Care Directive or Living Will:     Preoperative Review of :   reviewed - controlled substances prescribed by other outside provider(s).      Status of Chronic Conditions:  See problem list for active medical problems.  Problems all longstanding and stable, except as noted/documented.  See ROS for pertinent symptoms related to these conditions.    HYPERLIPIDEMIA - Patient has a long history of significant Hyperlipidemia requiring medication for treatment with recent good control. Patient reports no problems or side effects with the medication.     Review of Systems  CONSTITUTIONAL: NEGATIVE for fever, chills, change in weight  ENT/MOUTH: NEGATIVE for ear, mouth and throat problems  RESP: NEGATIVE for significant cough or SOB  CV: NEGATIVE for chest pain, palpitations or peripheral edema  GI: POSITIVE for gas or  bloating and heartburn or reflux  ENDOCRINE: NEGATIVE for temperature intolerance, skin/hair changes  HEME/ALLERGY/IMMUNE: NEGATIVE for bleeding problems  PSYCHIATRIC: NEGATIVE for changes in mood or affect  ROS otherwise negative    Patient Active Problem List    Diagnosis Date Noted    Hip pain, bilateral 02/17/2023     Priority: Medium    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 pylori infection 03/06/2021     Priority: Medium     positive stool test - treated and improved 02/ 2021       Pain of right hand 01/16/2021     Priority: Medium     mostly thumb and index finger       Numbness and tingling in right hand 01/16/2021     Priority: Medium     Likely carpel tunnel / tendonitis - improved with using splint      Seasonal allergic rhinitis due to other allergic trigger 01/16/2021     Priority: Medium    Chronic cough 01/14/2021     Priority: Medium     mostly to clear her throat , possible gerd / or allergy related       Gastroesophageal reflux disease, unspecified whether esophagitis present 01/14/2021     Priority: Medium    Hallux valgus of left foot 04/24/2019     Priority: Medium    PVC's (premature ventricular contractions) 12/16/2018     Priority: Medium    Palpitations 12/08/2017     Priority: Medium    Family history of polyps in the colon 06/25/2015     Priority: Medium     BROTHER AND SISTER.       Advanced directives, counseling/discussion 08/16/2011     Priority: Medium    Hyperlipidemia LDL goal <130 10/31/2010     Priority: Medium    Family history of ischemic heart disease      Priority: Medium     parents and bothers with CAD onset age 60s      Osteopenia      Priority: Medium    Hidradenitis      Priority: Medium     recurrent, groin        Past Medical History:   Diagnosis Date    Allergy to cats     nasal symptoms    Arthritis 2015    x-ray    Family history of ischemic heart disease      parents and brother with CAD onset age 60s    Hidradenitis     recurrent, groin    Influenza with other manifestations 1972    hospitalized    Menopause     age 43    Mixed hyperlipidemia     Osteopenia 2003 FRAX: 12% risk major osteoporotic fx, 2.2% risk hip fx    Status post bunionectomy 2012    right with hammertoe repairs     Past Surgical History:   Procedure Laterality Date    BUNIONECTOMY Left 2019    Procedure: LEFT HALLIX VAGUS AND SECOND CLAWTOE RECONSTRUCTION;  Surgeon: Tyler Jameson MD;  Location:  OR    BUNIONECTOMY KATIE  2012    Procedure:BUNIONECTOMY KATIE; RIGHT FOOT EULA KATIE BUNIONECTOMY, HAMMER TOE RECONSTRUCTION DIGITS TWO, THREE, FOUR AND FIVE; Surgeon:DEANDRE MONTE; Location: [facility]    C DEXA INTERPRETATION, AXIAL  2003    T score femur -1.3, lumbar -1.0    C DEXA INTERPRETATION, AXIAL  2004    T score lumbar -1.2, femoral -1.4    C DEXA INTERPRETATION, AXIAL  2007    T score lumbar -1.5 (decline -2.8%/yr), femoral neck -1.3/-1.8 (decline -1.8%/yr)    COLONOSCOPY      COLONOSCOPY  2000    normal, repeat 10 years    COLONOSCOPY  10/2010    normal, repeat 5 years    DEXA  10/2010    T score lumbar -1.2(stable), femoral neck -1.8/-2.2 with BMD 0.755 (6% worsening)    HC TOOTH EXTRACTION W/FORCEP      wisdom teeth extracted    REPAIR HAMMER TOE  2012    Procedure:REPAIR HAMMER TOE; Surgeon:DEANDRE MONTE; Location: [facility]    STRESS ECHO  10/2010    normal LV function at rest, hyperdynamic with exercise, EF 55-60% at rest and 70% post exercise    STRESS ECHO  10/2010    negative for echo/EKG changes of ischemia, normal LV fct with EF 55-60%, systolic fct hyperdynamic with exercise, exercised 7 min, EK.5 mm upsloping ST depression inf lead    STRESS ECHO (METRO)  2004    negative, exercise 8-9 min    ZC DEXA INTERPRETATION, AXIAL  2014    T score L1-4 of -1.4 (dec 2.7%), femoral neck -1.9/-2.2 with BMD 0.728 (dec 0.8%)  "    Current Outpatient Medications   Medication Sig Dispense Refill    atorvastatin (LIPITOR) 20 MG tablet TAKE 1 TABLET BY MOUTH EVERY DAY 90 tablet 2    Calcium-Vitamin D-Vitamin K 500-1000-40 MG-UNT-MCG CHEW Take 2 chew tab by mouth 2 times daily (Gummie)      carboxymethylcellulose PF (REFRESH PLUS) 0.5 % SOLN ophthalmic solution Place 1-2 drops into both eyes At Bedtime      Coenzyme Q10-Vitamin E (QUNOL ULTRA COQ10 PO) Take 2 teaspoonful by mouth daily      cyanocobalamin (VITAMIN B-12) 1000 MCG tablet Take 1,000 mcg by mouth daily 2500 mcg day      fish oil-omega-3 fatty acids 1000 MG capsule Take 2 g by mouth daily 1600 mg daily      melatonin 1 MG TABS Take 2 mg by mouth At Bedtime       Multiple Vitamins-Minerals (PRESERVISION AREDS 2) CHEW       nifedipine 0.2% in white petrolatum 0.2 % OINT ointment APPLY TOPICALLY TWICE DAILY AS DIRECTED      nifedipine 0.2% in white petrolatum 0.2 % OINT ointment Apply topically 2 times daily 100 g 0    omeprazole (PRILOSEC) 20 MG DR capsule TAKE 1 CAPSULE BY MOUTH TWICE DAILY 180 capsule 0    polyethylene glycol 0.4%- propylene glycol 0.3% (SYSTANE ULTRA) 0.4-0.3 % SOLN ophthalmic solution Place 1-2 drops into both eyes daily as needed for dry eyes      Probiotic Product (PROBIOTIC-10) CAPS Take 1 capsule by mouth daily      Psyllium (METAMUCIL) 0.36 g CAPS          No Known Allergies     Social History     Tobacco Use    Smoking status: Never    Smokeless tobacco: Never   Substance Use Topics    Alcohol use: Not Currently     Comment: Wine occasionally       History   Drug Use No         Objective     BP (!) 142/84   Pulse 87   Resp 16   Ht 1.549 m (5' 1\")   Wt 56.2 kg (124 lb)   SpO2 98%   BMI 23.43 kg/m      Physical Exam  GENERAL APPEARANCE: healthy, alert and no distress  HENT: ear canals and TM's normal and nose and mouth without ulcers or lesions  RESP: lungs clear to auscultation - no rales, rhonchi or wheezes  CV: regular rates and rhythm and normal S1 " S2, no S3 or S4  ABDOMEN: soft, nontender, no HSM or masses and bowel sounds normal  SKIN: no suspicious lesions or rashes  NEURO: Normal strength and tone, sensory exam grossly normal, mentation intact and speech normal  PSYCH: mentation appears normal and affect normal/bright    Recent Labs   Lab Test 02/17/23  1150 01/27/22  1556   HGB 12.3 12.1    286    137   POTASSIUM 4.5 3.9   CR 0.77 0.71        Diagnostics:  No labs were ordered during this visit.   EKG: appears normal, NSR, normal axis, normal intervals, no acute ST/T changes c/w ischemia, no LVH by voltage criteria, Right Bundle Branch Block, Premature Atrial Contractions (PAC) noted, unchanged from previous tracings    Revised Cardiac Risk Index (RCRI):  The patient has the following serious cardiovascular risks for perioperative complications:   - No serious cardiac risks = 0 points     RCRI Interpretation: 0 points: Class I (very low risk - 0.4% complication rate)         Signed Electronically by: Nettie Escudero PA-C  Copy of this evaluation report is provided to requesting physician.

## 2023-12-30 NOTE — PROGRESS NOTES
Intake Assessment    Assessment Method  Assessment Method  Assessment Method: In-person assessment    Intake Assessment Completed By  Intake Assessment Completed by:  Completed by:: MO HUGGINS  Service Type ([facility] ONLY): Crisis  Reason for Seeking Treatment  Patient stated reason for treatment: \"I have been overwhelmed and my sister was concerned\"  Reason for assessment: Depression  The Patient is Experiencing: An increase in acute symptomatology, A deterioration in the level of role functioning within the last 7 days, and/or    Pt. Brought to Hospital By  Patient Brought to Hospital By  Pt Brought to Hospital By:: Ambulance  Do You Identify as Male or Female?: Female    Provider Information  Provider Information  Psychiatrist: Yes  Name: Dr. Odessa Meza MD  Phone: [phone]  Address: [address] [place] [address]  Date of Last Visit: 12/28/23  [facility]  Therapist: None  Most Recent Inpatient/Partial Hospitalization/IOP  Most Recent Inpatient/Partial Hospitalization/IOP: None    Weapons Assessment  Weapons  Do You Have Any Weapons or Firearms with You Today?: No  Do You Have Any Weapons or Firearms You Plan to Use to Harm Yourself or Others?: No    Violence Assessment  Violence Assessment  Any history of arrests or legal charges for serious crimes (robbery, sexual assault, assault battery, weapons charge, murder)?: No  Any recent or current thoughts/threats to harm or kill others?: No  Access to Means: No  Has there been a recent history of anger, outbursts, property destruction, or aggression?: No   Does patient have a plan to act in a violent manner?: No    Pt. Safety Screen  [place] Violence Checklist  Confused: 0  Irritable: 0  Boisterous: 0  Verbal Threats: 0  Physical Threats: 0  Attacking Objects: 0  Total Score (Sum): 0    C-SSRS (Short)  [place] Suicide Severity Rating Scale (C-SSRS)  1. Have you wished you were dead or wished you could go to sleep and not wake up? (past month):  Primary Cardiologist: Dr. Bragg    Reason for Visit: Review Echo    History of Present Illness:   Dorie is 72 year old female who was referred to Dr. Bragg for PVCs.  Her 48-hour Holter monitor showed only 1% PVC burden.  There was also some questionable aortic valve disease based on her previous stress echocardiogram study.  She was set up for full resting echocardiogram for more extensive structural evaluation.  She was also recommended to check her blood pressure daily as it was moderately hypertensive during her visit with Dr. Bragg.    Dorie presents to clinic today with her spouse, stating she is doing well.  She denies any significant symptoms of chest discomfort, palpitations, orthopnea, PND,  headache, or vision changes.    Assessment and Plan:  Dorie is a 72-year-old female with PVCs and whitecoat hypertension.    We reviewed her recent echocardiogram study which showed aortic valve sclerosis but no evidence of stenosis.  Her LVEF was preserved.  She had no other structural abnormalities.  Her blood pressure log showed that her average systolic is in the 120s.  I informed her that we do not need to make any medication changes at this time.  She will see us in cardiology clinic as needed.      This note was completed in part using Dragon voice recognition software. Although reviewed after completion, some word and grammatical errors may occur.    Orders this Visit:  No orders of the defined types were placed in this encounter.    No orders of the defined types were placed in this encounter.    There are no discontinued medications.      Encounter Diagnoses   Name Primary?     Abnormal Holter monitor finding      PVC's (premature ventricular contractions)      Palpitations      Nonspecific abnormal electrocardiogram (ECG) (EKG)        CURRENT MEDICATIONS:  Current Outpatient Medications   Medication Sig Dispense Refill     atorvastatin (LIPITOR) 20 MG tablet Take 1 tablet (20 mg) by mouth daily 90 tablet  1     calcium carbonate 500 mg, elemental, (OSCAL 500) 1250 (500 Ca) MG TABS tablet Take by mouth daily 500 daily twice daily       Calcium-Vitamin D-Vitamin K 500-1000-40 MG-UNT-MCG CHEW Take 2 chew tab by mouth 2 times daily (Gummie)       carboxymethylcellulose PF (REFRESH PLUS) 0.5 % SOLN ophthalmic solution Place 1-2 drops into both eyes At Bedtime       Coenzyme Q10-Vitamin E (QUNOL ULTRA COQ10 PO) Take 2 teaspoonful by mouth daily       cyanocobalamin (VITAMIN B-12) 1000 MCG tablet Take 1,000 mcg by mouth daily 2500 mcg day       fexofenadine (ALLEGRA) 180 MG tablet Take 1 tablet (180 mg) by mouth daily       fish oil-omega-3 fatty acids 1000 MG capsule Take 2 g by mouth daily 1600 mg daily       melatonin 1 MG TABS Take 2 mg by mouth At Bedtime        Multiple Vitamins-Minerals (PRESERVISION AREDS 2) CHEW        omeprazole (PRILOSEC) 20 MG DR capsule TAKE 1 CAPSULE BY MOUTH EVERY DAY 90 capsule 2     polyethylene glycol 0.4%- propylene glycol 0.3% (SYSTANE ULTRA) 0.4-0.3 % SOLN ophthalmic solution Place 1-2 drops into both eyes daily as needed for dry eyes       Probiotic Product (PROBIOTIC-10) CAPS Take 1 capsule by mouth daily       sucralfate (CARAFATE) 1 GM tablet Take 1 tablet (1 g) by mouth 4 times daily as needed for nausea (GERD) 40 tablet 1     UNABLE TO FIND MEDICATION NAME: calm 325 mg at night         ALLERGIES   No Known Allergies    PAST MEDICAL HISTORY:  Past Medical History:   Diagnosis Date     Allergy to cats     nasal symptoms     Arthritis 2015    x-ray     Family history of ischemic heart disease     parents and brother with CAD onset age 60s     Hidradenitis     recurrent, groin     Influenza with other manifestations 1972    hospitalized     Menopause 1993    age 43     Mixed hyperlipidemia      Osteopenia 2003 2014 FRAX: 12% risk major osteoporotic fx, 2.2% risk hip fx     Status post bunionectomy 05/2012    right with hammertoe repairs       PAST SURGICAL HISTORY:  Past Surgical  No  2. Have you actually had any thoughts of killing yourself? (past month): No  6. Have you ever done anything, started to do anything, or prepared to do anything to end your life? (lifetime): No  Suicide Evaluation: Negative Screen - White    Contributing Factors to Suicide Risk  Contributing Factors to Suicide Risk  Current/Past Psychiatric History: Anxiety, Depression  Key Suicidal Symptoms: Helplessness, Hopelessness, Intrusive thoughts  Precipitants/Stressors/Interpersonal Concerns: Change in psychotropic medication, Loss of relationship(s), Social isolation, Unwillingness to seek help because of stigma attached to mental and substance abuse disorders and/or suicidal thoughts    Family Mental Health Hx.  Family Mental Health History  Family History of Suicide: Yes  Description: Pt reports her maternal grandfather completed suicide  Family History of Suicide Attempts: Yes  Description: Pt reports her maternal grandfather completed suicide  Family History of Mental Health Diagnosis: Yes  Description: Pt reports maternal grandfather had a hx of depression, states she and her two sisters have been diagnosed with depression  Family Member with Psychiatric Disorder Requiring Hospitalization: No    Legal Status  Legal Status  Legal Issues: None    Trauma Assessment  Trauma Assessment  In your life, have you ever had any experience that was so frightening, horrible, or upsetting that you thought about it in the past month?: Yes    Sleep Analysis  Sleep Analysis  Sleep Report: Fair  Sleep/Wake Cycle: Unremarkable  Hours Slept: 6  What Shift Do You Work?: 1st shift  Have you been diagnosed with Sleep Apnea?: No    Nutritional Assessment  Nutrition Assessment  Are You Drinking Fluids Daily?: Yes  Any Changes in Your Appetite?: No  Dental Problems Impairing Ability to Eat?: No  Weight Gain of 10 or More Pounds in the Last Month: No  Weight Loss of 10 or More Pounds in the Last Month: No  Do you have a history of  History:   Procedure Laterality Date     BUNIONECTOMY Left 2019    Procedure: LEFT HALLIX VAGUS AND SECOND CLAWTOE RECONSTRUCTION;  Surgeon: Tyler Jameson MD;  Location:  OR     BUNIONECTOMY KATIE  2012    Procedure:BUNIONECTOMY KATIE; RIGHT FOOT EULA KATIE BUNIONECTOMY, HAMMER TOE RECONSTRUCTION DIGITS TWO, THREE, FOUR AND FIVE; Surgeon:DEANDRE MONTE; Location: [facility]     C DEXA INTERPRETATION, AXIAL  2003    T score femur -1.3, lumbar -1.0     C DEXA INTERPRETATION, AXIAL  2004    T score lumbar -1.2, femoral -1.4     C DEXA INTERPRETATION, AXIAL  2007    T score lumbar -1.5 (decline -2.8%/yr), femoral neck -1.3/-1.8 (decline -1.8%/yr)     COLONOSCOPY       COLONOSCOPY  2000    normal, repeat 10 years     COLONOSCOPY  10/2010    normal, repeat 5 years     DEXA  10/2010    T score lumbar -1.2(stable), femoral neck -1.8/-2.2 with BMD 0.755 (6% worsening)     HC TOOTH EXTRACTION W/FORCEP      wisdom teeth extracted     REPAIR HAMMER TOE  2012    Procedure:REPAIR HAMMER TOE; Surgeon:DEANDRE MONTE; Location: [facility]     STRESS ECHO  10/2010    normal LV function at rest, hyperdynamic with exercise, EF 55-60% at rest and 70% post exercise     STRESS ECHO  10/2010    negative for echo/EKG changes of ischemia, normal LV fct with EF 55-60%, systolic fct hyperdynamic with exercise, exercised 7 min, EK.5 mm upsloping ST depression inf lead     STRESS ECHO (METRO)  2004    negative, exercise 8-9 min     Z DEXA INTERPRETATION, AXIAL  2014    T score L1-4 of -1.4 (dec 2.7%), femoral neck -1.9/-2.2 with BMD 0.728 (dec 0.8%)       FAMILY HISTORY:  Family History   Problem Relation Age of Onset     C.A.D. Father         MI age 61,   age 76 from MI     Diabetes Father      Cancer Father         esophagus, former smoker     Obesity Father      Coronary Artery Disease Father              Other Cancer Father      C.A.D. Mother         MI age 62     Lipids Mother       disordered eating?: No    Mental Status  MENTAL STATUS  Level of Consciousness (calc): Alert  Orientation: Oriented to person  Attention (calc): Maintains attention  Memory: Intact  Perceptual Misinterpretations/Hallucinations: Clear reality based perceptions  Speech: Clear/understandable  Motor Behavior-Agitation (calc): Calm and purposeful  Motor Behavior-Retardation (calc): Calm and purposeful  Behavior: Crying  Affect: Depressed  Mood : Depressed    Social Assessment  Social Assessment  What is your living situation today? : I have a steady place to live  Type of Residence: House  Ownership: Resident  In the past 12 months has the electric, gas, oil, or water company threatened to shut off services in your home?: No  Do you have problems with any of the following? : None of the above  Living Arrangements: Family members  Support Systems: Family members, Siblings  In the past year, have you or any family members you live with been unable to get any of the following when it was really needed? Check all that apply.: None  In the past 12 months, has lack of reliable transportation kept you from medical appointments, meetings, work or from getting things needed for daily living? : No  Employment Status: Student  Type of School:  (medical school)  Are you a ?: No   Status: None    Substance Possession   Substance Possession  Do You Have Any Alcohol or Drugs with You Today?: No    Alcohol Assessment  Alcohol  How often do you have a drink containing alcohol?: Never  How many standard drinks containing alcohol do you have on a typical day?: 0,1 or 2  How often do you have 6 or more drinks on one occasion?: Never  Audit C Total Score: 0  Alcohol Use: Denies    Assessment Summary  Assessment Summary  Assessment Summary: Pt is a 23 year old female with a hx of ADHD presenting due to worsening depression. Pt presents alert and oriented x4, is calm and cooperative at time of assessment. Pt presents  Blood Disease Mother         pernicious anemia     Thyroid Disease Mother      Eye Disorder Mother         macular degeneration     Osteoporosis Mother      Connective Tissue Disorder Mother         SLE - remission     Hypertension Mother      Hyperlipidemia Mother      Asthma Mother      Dementia Mother         memory loss, onset early 90s     Coronary Artery Disease Mother              C.A.D. Maternal Grandmother          early 80s     C.A.D. Maternal Grandfather          at younger age     C.A.D. Paternal Grandmother          late 70s     Hypertension Brother      Prostate Cancer Brother      Colon Polyps Brother         sister may also have colon polyps     Coronary Artery Disease Brother         angioplasty     Asthma Brother      Obesity Brother         No longer obese     Arrhythmia Brother      Hyperlipidemia Sister      Osteoporosis Sister         she has osteopenia     Colon Polyps Sister         possible       SOCIAL HISTORY:  Social History     Socioeconomic History     Marital status:      Spouse name: Armond     Number of children: 0     Years of education: 16     Highest education level: None   Occupational History     Occupation: [employer]     Employer: [employer]   Tobacco Use     Smoking status: Never Smoker     Smokeless tobacco: Never Used   Vaping Use     Vaping Use: Never used   Substance and Sexual Activity     Alcohol use: Not Currently     Comment: Wine occasionally     Drug use: No     Sexual activity: Not Currently     Partners: Male     Birth control/protection: None     Comment: same partner since    Other Topics Concern      Service No     Blood Transfusions No     Caffeine Concern No     Occupational Exposure No     Hobby Hazards No     Sleep Concern Yes     Comment:  snores, occ. sleep disturbances     Stress Concern Yes     Comment: moderate     Weight Concern No     Special Diet Yes     Comment: ( diabetic)- low carb//soy  with depressed mood and congruent affect, tearful throughout assessment. Pt reports she has been feeling increasingly overwhelmed recently and her sister had concerns. Pt denies SI, HI, AVH, SIB at time of assessment. Pt reports she has been struggling to manage her school work and has had recent setbacks in medical school. Pt reports she failed her boards and is now on a remediation plan to be able to restart her rotations this summer. Pt reports she goes to school in [place], but is local for the holiday break. Pt reports a tense relationship with her family and that they are distant normally. Pt reports her only support is her boyfriend, otherwise is isolated. Pt reports she sees a psychiatrist and was recently supposed to start an antidepressant, but states she has not picked it up yet. Pt reports concerns about mental health impacting her ability to become a doctor. Pt reports she does feel a lot of sadness with herself and worthlessness. Pt's family completed an involuntary petition stating pt made suicidal comments to sister. Per sister, pt said she was going to \"end it all\" and that she believes it is the only way out of her mess. Sister reports pt made comments about rehoming her pet bunny and tying up loose ends in [place]. Sister reports pt has been increasingly isolated, states pt ignores texts/phone calls, has not been engaging socially with people, and broke up with her boyfriend. Sister reports pt has been making more frequent hopeless/helpless statements to family about not seeing a point to life or school anymore and feeling like a burden. Sister reports safety concerns for sister due to a family hx of suicide and depression. Pt requires psychiatric hospitalization for safety and stabilization due to risk of harm to self.    Physician Recommended LOC  Recommended Level of Care   Recommended Level of Care: IP  Type of Treatment Recommended: Mental Health  Place of Treatment Recommended:  "[facility]     Back Care No     Exercise Yes     Comment: walking/aerobic 60 min 2 days per week, yoga one hour per week     Bike Helmet No     Seat Belt Yes     Self-Exams No     Parent/sibling w/ CABG, MI or angioplasty before 65F 55M? Yes     Comment: Mother   Social History Narrative     Lives with  who is diabetic.       Review of Systems:  Skin:  Negative     Eyes:  Positive for glasses  ENT:  Negative    Respiratory:  Negative    Cardiovascular:    Positive for;palpitations  Gastroenterology: Negative    Genitourinary:  Negative    Musculoskeletal:  Positive for arthritis  Neurologic:  Negative    Psychiatric:  Negative    Heme/Lymph/Imm:  Negative    Endocrine:  Negative      Physical Exam:  Vitals: BP (!) 147/83   Pulse 60   Ht 1.549 m (5' 1\")   Wt 56.7 kg (125 lb)   SpO2 99%   BMI 23.62 kg/m       GEN:  NAD  NECK: No JVD  C/V:  Regular rate and rhythm, no murmur, rub or gallop.  RESP: Clear to auscultation bilaterally without wheezing, rales, or rhonchi.  GI: Abdomen soft, nontender, nondistended.   EXTREM: No pitting LE edema.   NEURO: Alert and oriented, cooperative. No obvious focal deficits.   PSYCH: Normal affect.  SKIN: Warm and dry.       Recent Lab Results:  LIPID RESULTS:  Lab Results   Component Value Date    CHOL 166 02/15/2022    CHOL 246 (H) 01/14/2021    HDL 72 02/15/2022    HDL 72 01/14/2021    LDL 83 02/15/2022     (H) 01/14/2021    TRIG 56 02/15/2022    TRIG 97 01/14/2021    CHOLHDLRATIO 3.5 06/25/2015       LIVER ENZYME RESULTS:  Lab Results   Component Value Date    AST 20 01/27/2022    AST 16 01/14/2021    ALT 34 01/27/2022    ALT 28 01/14/2021       CBC RESULTS:  Lab Results   Component Value Date    WBC 5.8 01/27/2022    WBC 5.0 12/07/2017    RBC 3.96 01/27/2022    RBC 4.25 12/07/2017    HGB 12.1 01/27/2022    HGB 13.0 11/20/2018    HCT 35.9 01/27/2022    HCT 39.0 12/07/2017    MCV 91 01/27/2022    MCV 92 12/07/2017    MCH 30.6 01/27/2022    MCH 31.3 12/07/2017    [facility] " [facility]  Declined/AMA Signed: [facility]    Reasons for Level of Treatment  Reasons for Level of Treatment  Reason(s) for Inpatient Treatment: Danger to self/others/property with plan and intent or attempt, Severe incapacitating depression and/or anziety manifested by inability to take care of self/others    Primary Diagnosis  Primary Diagnosis  State ICD 10 Diagnosis: F32.2 Major Depressive Disorder, single episode, severe    Referral Source Notified  Referral Source  Referral Source Notified: No referral source    Weapons Intervention  Weapons Intervention  Do You Have Any Weapons or Firearms at Home?: No    Intake Assessment Completed  Intake Assessment Completed  Intake Assessment Completed: Yes  Total Face to Face Time: 30 min    FOID Clear and Present Danger Reporting   FOID Clear and Present Danger Reporting  Event Type: Clear and Present Danger  Clear and Present Danger Event Date: 12/30/23  Clear and Present Danger: A. Communicates a serious threat of physical violence to himself/herself or others  Qualified Examiner Type: Physician  Qualified Examiner Note: SI  Clear and Present Danger:    Final Disposition   Final Disposition  Disposition Level of Care: IP  Place of Treatment Recommended: [facility]       33.7 01/27/2022    MCHC 34.1 12/07/2017    RDW 12.7 01/27/2022    RDW 13.8 12/07/2017     01/27/2022     12/07/2017       BMP RESULTS:  Lab Results   Component Value Date     01/27/2022     01/14/2021    POTASSIUM 3.9 01/27/2022    POTASSIUM 3.9 01/14/2021    CHLORIDE 105 01/27/2022    CHLORIDE 106 01/14/2021    CO2 27 01/27/2022    CO2 27 01/14/2021    ANIONGAP 5 01/27/2022    ANIONGAP 5 01/14/2021    GLC 91 01/27/2022    GLC 99 01/14/2021    BUN 11 01/27/2022    BUN 16 01/14/2021    CR 0.71 01/27/2022    CR 0.75 01/14/2021    GFRESTIMATED 89 01/27/2022    GFRESTIMATED 79 01/14/2021    GFRESTBLACK >90 01/14/2021    KARLA 9.0 01/27/2022    KARLA 9.3 01/14/2021        A1C RESULTS:  No results found for: A1C    INR RESULTS:  No results found for: INR        Nancy Dumont PA-C  July 21, 2022

## 2024-01-18 ENCOUNTER — PATIENT OUTREACH (OUTPATIENT)
Dept: CARE COORDINATION | Facility: CLINIC | Age: 74
End: 2024-01-18
Payer: COMMERCIAL

## 2024-01-26 ENCOUNTER — MYC MEDICAL ADVICE (OUTPATIENT)
Dept: FAMILY MEDICINE | Facility: CLINIC | Age: 74
End: 2024-01-26
Payer: COMMERCIAL

## 2024-01-26 DIAGNOSIS — K21.9 GASTROESOPHAGEAL REFLUX DISEASE, UNSPECIFIED WHETHER ESOPHAGITIS PRESENT: ICD-10-CM

## 2024-01-26 DIAGNOSIS — E78.5 HYPERLIPIDEMIA LDL GOAL <130: ICD-10-CM

## 2024-01-26 RX ORDER — ATORVASTATIN CALCIUM 20 MG/1
20 TABLET, FILM COATED ORAL DAILY
Qty: 90 TABLET | Refills: 0 | Status: SHIPPED | OUTPATIENT
Start: 2024-01-26 | End: 2024-04-19

## 2024-02-01 ENCOUNTER — PATIENT OUTREACH (OUTPATIENT)
Dept: CARE COORDINATION | Facility: CLINIC | Age: 74
End: 2024-02-01
Payer: COMMERCIAL

## 2024-02-27 ENCOUNTER — OFFICE VISIT (OUTPATIENT)
Dept: FAMILY MEDICINE | Facility: CLINIC | Age: 74
End: 2024-02-27
Payer: COMMERCIAL

## 2024-02-27 ENCOUNTER — HOSPITAL ENCOUNTER (OUTPATIENT)
Dept: GENERAL RADIOLOGY | Facility: CLINIC | Age: 74
Discharge: HOME | End: 2024-02-27
Attending: INTERNAL MEDICINE | Admitting: INTERNAL MEDICINE
Payer: COMMERCIAL

## 2024-02-27 VITALS
SYSTOLIC BLOOD PRESSURE: 128 MMHG | TEMPERATURE: 98.5 F | RESPIRATION RATE: 18 BRPM | OXYGEN SATURATION: 95 % | DIASTOLIC BLOOD PRESSURE: 85 MMHG | HEART RATE: 96 BPM

## 2024-02-27 DIAGNOSIS — E78.5 HYPERLIPIDEMIA LDL GOAL <130: ICD-10-CM

## 2024-02-27 DIAGNOSIS — I35.1 MILD AORTIC REGURGITATION: ICD-10-CM

## 2024-02-27 DIAGNOSIS — S82.891D CLOSED FRACTURE OF RIGHT ANKLE WITH ROUTINE HEALING: ICD-10-CM

## 2024-02-27 DIAGNOSIS — R01.1 HEART MURMUR: ICD-10-CM

## 2024-02-27 DIAGNOSIS — K21.9 GASTROESOPHAGEAL REFLUX DISEASE, UNSPECIFIED WHETHER ESOPHAGITIS PRESENT: ICD-10-CM

## 2024-02-27 DIAGNOSIS — R05.3 CHRONIC COUGH: ICD-10-CM

## 2024-02-27 DIAGNOSIS — J45.901 BRONCHITIS, ALLERGIC, UNSPECIFIED ASTHMA SEVERITY, WITH ACUTE EXACERBATION: ICD-10-CM

## 2024-02-27 DIAGNOSIS — J30.89 SEASONAL ALLERGIC RHINITIS DUE TO OTHER ALLERGIC TRIGGER: ICD-10-CM

## 2024-02-27 DIAGNOSIS — Z00.00 ENCOUNTER FOR MEDICARE ANNUAL WELLNESS EXAM: Primary | ICD-10-CM

## 2024-02-27 DIAGNOSIS — M46.1 DEGENERATIVE JOINT DISEASE OF SACROILIAC JOINT (H): ICD-10-CM

## 2024-02-27 DIAGNOSIS — R20.0 NUMBNESS AND TINGLING IN RIGHT HAND: ICD-10-CM

## 2024-02-27 DIAGNOSIS — R20.2 NUMBNESS AND TINGLING IN RIGHT HAND: ICD-10-CM

## 2024-02-27 DIAGNOSIS — Z78.0 ASYMPTOMATIC MENOPAUSE: ICD-10-CM

## 2024-02-27 DIAGNOSIS — E67.3 HYPERVITAMINOSIS D: ICD-10-CM

## 2024-02-27 PROCEDURE — 99215 OFFICE O/P EST HI 40 MIN: CPT | Mod: 25 | Performed by: INTERNAL MEDICINE

## 2024-02-27 PROCEDURE — 71046 X-RAY EXAM CHEST 2 VIEWS: CPT

## 2024-02-27 PROCEDURE — G0439 PPPS, SUBSEQ VISIT: HCPCS | Performed by: INTERNAL MEDICINE

## 2024-02-27 RX ORDER — PREDNISONE 20 MG/1
40 TABLET ORAL DAILY
Qty: 10 TABLET | Refills: 0 | Status: SHIPPED | OUTPATIENT
Start: 2024-02-27 | End: 2024-03-14

## 2024-02-27 RX ORDER — ALBUTEROL SULFATE 90 UG/1
2 AEROSOL, METERED RESPIRATORY (INHALATION) EVERY 6 HOURS PRN
Qty: 18 G | Refills: 0 | Status: SHIPPED | OUTPATIENT
Start: 2024-02-27 | End: 2024-03-20

## 2024-02-27 ASSESSMENT — ENCOUNTER SYMPTOMS: COUGH: 1

## 2024-02-27 ASSESSMENT — PAIN SCALES - GENERAL: PAINLEVEL: NO PAIN (0)

## 2024-02-27 NOTE — PROGRESS NOTES
Assessment and Plan  1. Encounter for Medicare annual wellness exam    Last seen patient in April 2023 for pelvic pain for which CT abdomen was done showing right inguinal hernia and she was given referral to surgeryAnd was supposed to get robotic right inguinal hernia repair.    Recently seen [facility] for preoperative clearance of Rt Ankle fracture repair at [facility] , S/P ORIF .     Pt does have acute symptoms of URI.  Opting this to be annual wellness visit as she could not travel again for another visit to the clinic - given her ongoing right ankle fracture follow-up with orthopedics.    - REVIEW OF HEALTH MAINTENANCE PROTOCOL ORDERS  - Lipid panel reflex to direct LDL Fasting; Future  - Comprehensive metabolic panel (BMP + Alb, Alk Phos, ALT, AST, Total. Bili, TP); Future  - CBC with platelets; Future  - Vitamin D Deficiency; Future    2. Closed fracture of right ankle with routine healing S/P ORIF at [facility] in 12/2023  Recent hospitalization and discharge in 12/2023 for ORIF procedure after accidental ground level fall injury. Reviewed [facility] records in Care everywhere and discussed with the pt. Pt Rt ankle is completely packed in her boot and going to see her Orthopedics for follow up today. Shared decision not to open it at this time as she denies any acute concerns of redness or swelling on the site of ORIF. To Follow-up with recommendations of orthopedics.  - Discussed on the need for DEXA scan, will order accordingly.  - DX Hip/Pelvis/Spine; Future    3. Degenerative joint disease of sacroiliac joint (H24)  Chronic stable, denies any new symptoms of low back pain.    4. Bronchitis, allergic, unspecified asthma severity, with acute exacerbation  Acute symptoms of ongoing cough, wheezing since past 2 weeks , denies fever or chills and no acute signs of infection on the Upper respiratory tract per my exam. Given physical exam positive for post tussive crackles and faint wheezes will treat this as acute  exacerbation of chronic bronchitis which she already has in the form of chronic cough.  This has been long awaited to be worked up on PFTs to be done for definitive diagnosis of possible underlying COPD.  - Shared decision to treat this empirically with antibiotic and steroid at this time along with chest x-ray to given patient age of 74 years to make sure no underlying pneumonia and emergencies.   - amoxicillin-clavulanate (AUGMENTIN) 875-125 MG tablet; Take 1 tablet by mouth 2 times daily  Dispense: 14 tablet; Refill: 0  - predniSONE (DELTASONE) 20 MG tablet; Take 2 tablets (40 mg) by mouth daily With breakfast  Dispense: 10 tablet; Refill: 0  - albuterol (PROAIR HFA/PROVENTIL HFA/VENTOLIN HFA) 108 (90 Base) MCG/ACT inhaler; Inhale 2 puffs into the lungs every 6 hours as needed for shortness of breath, wheezing or cough  Dispense: 18 g; Refill: 0  - XR Chest 2 Views; Future  - General PFT Lab (Please always keep checked); Future  - Pulmonary Function Test; Future    5. Chronic cough  6. Seasonal allergic rhinitis due to other allergic trigger  Chronic problem, given the above concerns will check x ray and await for this acute visit to be resolved before considering PFTs after 4-6 weeks. Pt understood and agreed with the plan.   - XR Chest 2 Views; Future  - General PFT Lab (Please always keep checked); Future  - Pulmonary Function Test; Future    7. Mild aortic regurgitation  8. Heart murmur  Ongoing problem, given pt echocardiogram in the past in 2022 showing Aortic regurgitation at that time. Physical exam is showing mild worsening of the murmur and will need recheck at this time for a functional worsening of this if any. Pt was previously seen by Cardiology for PVCs and currently no signs of CHF with edema   - Echocardiogram Complete; Future    9. Gastroesophageal reflux disease, unspecified whether esophagitis present  - Comprehensive metabolic panel (BMP + Alb, Alk Phos, ALT, AST, Total. Bili, TP); Future  -  CBC with platelets; Future    10. Hyperlipidemia LDL goal <130  - Lipid panel reflex to direct LDL Fasting; Future    11. Numbness and tingling in right hand  - Vitamin B12; Future    12. Hypervitaminosis D  - Vitamin D Deficiency; Future    13. Asymptomatic menopause  - DX Hip/Pelvis/Spine; Future       Over 40 minutes spent outside the preventive visit ( 20 minutes ) on reviewing patient chart,  face to face encounter, greater than 50% time spent with plan/cordination of care and documentation as above in my A/P.          Please note that this note consists of symbols derived from keyboarding, dictation and/or voice recognition software. As a result, there may be errors in the script that have gone undetected. Please consider this when interpreting information found in this chart.    Patient Instructions   As discussed , please do fasting LAB only appointment     Please follow up with Orthopedics for your Rt ankle fracture follow up     Will plan Lung function once your acute episode resolves. >> 4 weeks.     Sent in medications for your current Allergic bronchitis with antibiotic, Steroid , Albuterol inhaler sent to pharmacy.     Placed Echocardiogram for your heart murmur.     Pulmonary Function Test Address :     [facility]  [phone]  / [phone]  [address] [place] [address] PFT Lab's will distribute Patient Information Packet;   [facility] ([facility]) will contact patient by telephone  Please call the following departments if there are questions or need assistance:  [facility]: [phone]  [facility]: [phone]  [facility]: [phone]  [facility] Registered: [phone]  [facility]: [phone]  [facility]: [phone]    ============  Preventive Care Advice   This is general advice given by our system to help  you stay healthy. However, your care team may have specific advice just for you. Please talk to your care team about your preventive care needs.  Nutrition  Eat 5 or more servings of fruits and vegetables each day.  Try wheat bread, brown rice and whole grain pasta (instead of white bread, rice, and pasta).  Get enough calcium and vitamin D. Check the label on foods and aim for 100% of the RDA (recommended daily allowance).  Lifestyle  Exercise at least 150 minutes each week   (30 minutes a day, 5 days a week).  Do muscle strengthening activities 2 days a week. These help control your weight and prevent disease.  No smoking.  Wear sunscreen to prevent skin cancer.  Have a dental exam and cleaning every 6 months.  Yearly exams  See your health care team every year to talk about:  Any changes in your health.  Any medicines your care team has prescribed.  Preventive care, family planning, and ways to prevent chronic diseases.  Shots (vaccines)   HPV shots (up to age 26), if you've never had them before.  Hepatitis B shots (up to age 59), if you've never had them before.  COVID-19 shot: Get this shot when it's due.  Flu shot: Get a flu shot every year.  Tetanus shot: Get a tetanus shot every 10 years.  Pneumococcal, hepatitis A, and RSV shots: Ask your care team if you need these based on your risk.  Shingles shot (for age 50 and up).  General health tests  Diabetes screening:  Starting at age 35, Get screened for diabetes at least every 3 years.  If you are younger than age 35, ask your care team if you should be screened for diabetes.  Cholesterol test: At age 39, start having a cholesterol test every 5 years, or more often if advised.  Bone density scan (DEXA): At age 50, ask your care team if you should have this scan for osteoporosis (brittle bones).  Hepatitis C: Get tested at least once in your life.  STIs (sexually transmitted infections)  Before age 24: Ask your care team if you should be screened for  STIs.  After age 24: Get screened for STIs if you're at risk. You are at risk for STIs (including HIV) if:  You are sexually active with more than one person.  You don't use condoms every time.  You or a partner was diagnosed with a sexually transmitted infection.  If you are at risk for HIV, ask about PrEP medicine to prevent HIV.  Get tested for HIV at least once in your life, whether you are at risk for HIV or not.  Cancer screening tests  Cervical cancer screening: If you have a cervix, begin getting regular cervical cancer screening tests at age 21. Most people who have regular screenings with normal results can stop after age 65. Talk about this with your provider.  Breast cancer scan (mammogram): If you've ever had breasts, begin having regular mammograms starting at age 40. This is a scan to check for breast cancer.  Colon cancer screening: It is important to start screening for colon cancer at age 45.  Have a colonoscopy test every 10 years (or more often if you're at risk) Or, ask your provider about stool tests like a FIT test every year or Cologuard test every 3 years.  To learn more about your testing options, visit: https://[email or web address]/097272.pdf.  For help making a decision, visit: https://bit.ly/gk29220.  Prostate cancer screening test: If you have a prostate and are age 55 to 69, ask your provider if you would benefit from a yearly prostate cancer screening test.  Lung cancer screening: If you are a current or former smoker age 50 to 80, ask your care team if ongoing lung cancer screenings are right for you.  For informational purposes only. Not to replace the advice of your health care provider. Copyright   2023 [employer]. All rights reserved. Clinically reviewed by the [facility] Transitions Program. [employer] 874020 - REV 01/24.    Hearing Loss: Care Instructions  Overview     Hearing loss is a sudden or slow decrease in how well you hear. It can range from slight to  profound. Permanent hearing loss can occur with aging. It also can happen when you are exposed long-term to loud noise. Examples include listening to loud music, riding motorcycles, or being around other loud machines.  Hearing loss can affect your work and home life. It can make you feel lonely or depressed. You may feel that you have lost your independence. But hearing aids and other devices can help you hear better and feel connected to others.  Follow-up care is a key part of your treatment and safety. Be sure to make and go to all appointments, and call your doctor if you are having problems. It's also a good idea to know your test results and keep a list of the medicines you take.  How can you care for yourself at home?  Avoid loud noises whenever possible. This helps keep your hearing from getting worse.  Always wear hearing protection around loud noises.  Wear a hearing aid as directed.  A professional can help you pick a hearing aid that will work best for you.  You can also get hearing aids over the counter for mild to moderate hearing loss.  Have hearing tests as your doctor suggests. They can show whether your hearing has changed. Your hearing aid may need to be adjusted.  Use other devices as needed. These may include:  Telephone amplifiers and hearing aids that can connect to a television, stereo, radio, or microphone.  Devices that use lights or vibrations. These alert you to the doorbell, a ringing telephone, or a baby monitor.  Television closed-captioning. This shows the words at the bottom of the screen. Most new TVs can do this.  TTY (text telephone). This lets you type messages back and forth on the telephone instead of talking or listening. These devices are also called TDD. When messages are typed on the keyboard, they are sent over the phone line to a receiving TTY. The message is shown on a monitor.  Use text messaging, social media, and email if it is hard for you to communicate by  "telephone.  Try to learn a listening technique called speechreading. It is not lipreading. You pay attention to people's gestures, expressions, posture, and tone of voice. These clues can help you understand what a person is saying. Face the person you are talking to, and have them face you. Make sure the lighting is good. You need to see the other person's face clearly.  Think about counseling if you need help to adjust to your hearing loss.  When should you call for help?  Watch closely for changes in your health, and be sure to contact your doctor if:    You think your hearing is getting worse.     You have new symptoms, such as dizziness or nausea.   Where can you learn more?  Go to https://www.Stuffle.net/patiented  Enter R798 in the search box to learn more about \"Hearing Loss: Care Instructions.\"  Current as of: February 28, 2023               Content Version: 13.8    4959-7682 [employer].   Care instructions adapted under license by your healthcare professional. If you have questions about a medical condition or this instruction, always ask your healthcare professional. [employer] disclaims any warranty or liability for your use of this information.      Bladder Training: Care Instructions  Your Care Instructions     Bladder training is used to treat urge incontinence and stress incontinence. Urge incontinence means that the need to urinate comes on so fast that you can't get to a toilet in time. Stress incontinence means that you leak urine because of pressure on your bladder. For example, it may happen when you laugh, cough, or lift something heavy.  Bladder training can increase how long you can wait before you have to urinate. It can also help your bladder hold more urine. And it can give you better control over the urge to urinate.  It is important to remember that bladder training takes a few weeks to a few months to make a difference. You may not see results right away, " but don't give up.  Follow-up care is a key part of your treatment and safety. Be sure to make and go to all appointments, and call your doctor if you are having problems. It's also a good idea to know your test results and keep a list of the medicines you take.  How can you care for yourself at home?  Work with your doctor to come up with a bladder training program that is right for you. You may use one or more of the following methods.  Delayed urination  In the beginning, try to keep from urinating for 5 minutes after you first feel the need to go.  While you wait, take deep, slow breaths to relax. Kegel exercises can also help you delay the need to go to the bathroom.  After some practice, when you can easily wait 5 minutes to urinate, try to wait 10 minutes before you urinate.  Slowly increase the waiting period until you are able to control when you have to urinate.  Scheduled urination  Empty your bladder when you first wake up in the morning.  Schedule times throughout the day when you will urinate.  Start by going to the bathroom every hour, even if you don't need to go.  Slowly increase the time between trips to the bathroom.  When you have found a schedule that works well for you, keep doing it.  If you wake up during the night and have to urinate, do it. Apply your schedule to waking hours only.  Kegel exercises  These tighten and strengthen pelvic muscles, which can help you control the flow of urine. (If doing these exercises causes pain, stop doing them and talk with your doctor.) To do Kegel exercises:  Squeeze your muscles as if you were trying not to pass gas. Or squeeze your muscles as if you were stopping the flow of urine. Your belly, legs, and buttocks shouldn't move.  Hold the squeeze for 3 seconds, then relax for 5 to 10 seconds.  Start with 3 seconds, then add 1 second each week until you are able to squeeze for 10 seconds.  Repeat the exercise 10 times a session. Do 3 to 8 sessions a  "day.  When should you call for help?  Watch closely for changes in your health, and be sure to contact your doctor if:    Your incontinence is getting worse.     You do not get better as expected.   Where can you learn more?  Go to https://www."[employer]net/patiented  Enter V684 in the search box to learn more about \"Bladder Training: Care Instructions.\"  Current as of: February 28, 2023               Content Version: 13.8    2054-9745 [email or web address].   Care instructions adapted under license by your healthcare professional. If you have questions about a medical condition or this instruction, always ask your healthcare professional. [employer] disclaims any warranty or liability for your use of this information.      Substance Use Disorder: Care Instructions  Overview     You can improve your life and health by stopping your use of alcohol or drugs. When you don't drink or use drugs, you may feel and sleep better. You may get along better with your family, friends, and coworkers. There are medicines and programs that can help with substance use disorder.  How can you care for yourself at home?  Here are some ways to help you stay sober and prevent relapse.  If you have been given medicine to help keep you sober or reduce your cravings, be sure to take it exactly as prescribed.  Talk to your doctor about programs that can help you stop using drugs or drinking alcohol.  Do not keep alcohol or drugs in your home.  Plan ahead. Think about what you'll say if other people ask you to drink or use drugs. Try not to spend time with people who drink or use drugs.  Use the time and money spent on drinking or drugs to do something that's important to you.  Preventing a relapse  Have a plan to deal with relapse. Learn to recognize changes in your thinking that lead you to drink or use drugs. Get help before you start to drink or use drugs again.  Try to stay away from situations, friends, or places that " may lead you to drink or use drugs.  If you feel the need to drink alcohol or use drugs again, seek help right away. Call a trusted friend or family member. Some people get support from organizations such as Narcotics Anonymous or You.Do or from treatment facilities.  If you relapse, get help as soon as you can. Some people make a plan with another person that outlines what they want that person to do for them if they relapse. The plan usually includes how to handle the relapse and who to notify in case of relapse.  Don't give up. Remember that a relapse doesn't mean that you have failed. Use the experience to learn the triggers that lead you to drink or use drugs. Then quit again. Recovery is a lifelong process. Many people have several relapses before they are able to quit for good.  Follow-up care is a key part of your treatment and safety. Be sure to make and go to all appointments, and call your doctor if you are having problems. It's also a good idea to know your test results and keep a list of the medicines you take.  When should you call for help?   Call 911  anytime you think you may need emergency care. For example, call if you or someone else:    Has overdosed or has withdrawal signs. Be sure to tell the emergency workers that you are or someone else is using or trying to quit using drugs. Overdose or withdrawal signs may include:  Losing consciousness.  Seizure.  Seeing or hearing things that aren't there (hallucinations).     Is thinking or talking about suicide or harming others.   Where to get help 24 hours a day, 7 days a week   If you or someone you know talks about suicide, self-harm, a mental health crisis, a substance use crisis, or any other kind of emotional distress, get help right away. You can:    Call the Suicide and Crisis Lifeline at 230.     Call 5-171-008-TALK (1-182.834.3614).     Text HOME to 410742 to access the Crisis Text Line.   Consider saving these numbers in your  "phone.  Go to [facility] [employer] for more information or to chat online.  Call your doctor now or seek immediate medical care if:    You are having withdrawal symptoms. These may include nausea or vomiting, sweating, shakiness, and anxiety.   Watch closely for changes in your health, and be sure to contact your doctor if:    You have a relapse.     You need more help or support to stop.   Where can you learn more?  Go to https://www.iFit.net/patiented  Enter H573 in the search box to learn more about \"Substance Use Disorder: Care Instructions.\"  Current as of: March 21, 2023               Content Version: 13.8    2976-6944 [employer].   Care instructions adapted under license by your healthcare professional. If you have questions about a medical condition or this instruction, always ask your healthcare professional. [employer] disclaims any warranty or liability for your use of this information.      Return in about 2 weeks (around 3/12/2024), or if symptoms worsen or fail to improve, for Follow up of last visit, If symptoms persist.    Lori Escamilla MD  [facility] SHAMIKA Oshea is a 74 year old, presenting for the following health issues:  Cough (For 2 weeks, wet cough with a lot phlegm ) and Nasal Congestion        2/27/2024     8:16 AM   Additional Questions   Roomed by Deysi     History of Present Illness       Reason for visit:  A very bad cold and coughing up phlegm  Symptom onset:  1-2 weeks ago  Symptoms include:  Coughing up phlegm, nasal congestion,blowing my nose a lot  Symptom intensity:  Severe  Symptom progression:  Staying the same  Had these symptoms before:  No  What makes it worse:  Not sleeping enough  What makes it better:  Getting some sleep. Taking a cough suppressant    She eats 2-3 servings of fruits and vegetables daily.She consumes 0 sweetened beverage(s) daily.She exercises with enough effort to increase her heart " rate 10 to 19 minutes per day.  She exercises with enough effort to increase her heart rate 5 days per week.   She is taking medications regularly.       Annual Wellness Visit     Patient has been advised of split billing requirements and indicates understanding: Yes          Health Care Directive  Patient does not have a Health Care Directive or Living Will: Discussed advance care planning with patient; information given to patient to review.  In general, how would you rate your overall physical health? good  Do you have a special diet?  Regular (no restrictions)         No data to display              Do you see a dentist two times every year?  Yes  Have you been more tired than usual lately?  No  If you drink alcohol do you typically have >3 drinks per day or >7 drinks per week? No  Do you have a current opioid prescription? No  Do you use any other controlled substances or medications that are not prescribed by a provider? (!) ALCOHOL  Social History     Tobacco Use    Smoking status: Never    Smokeless tobacco: Never   Vaping Use    Vaping Use: Never used   Substance Use Topics    Alcohol use: Not Currently     Comment: Wine occasionally    Drug use: No       Needs assistance for the following daily activities: no assistance needed  Which of the following safety concerns are present in your home?  none identified   Do you (or your family members) have any concerns about your safety while driving?  No  Do you have any of the following hearing concerns?: (!) I NEED TO ASK PEOPLE TO SPEAK UP OR REPEAT THEMSELVES and (!) TROUBLE UNDERSTANDING A SPEAKER AT A PUBLIC MEETING OR [employer] SERVICE  In the past 6 months, have you been bothered by leaking of urine? (!) YES   Information on urinary incontinence and treatment options given to patient.        12/11/2023   Social Factors   Worry food won't last until get money to buy more No   Food not last or not have enough money for food? No   Do you have housing?  Yes    Are you worried about losing your housing? No   Lack of transportation? No   Unable to get utilities (heat,electricity)? No          2/27/2024   Fall Risk   Fallen 2 or more times in the past year? No   Trouble with walking or balance? No          Today's PHQ-2 Score:       2/26/2024     8:37 PM   PHQ-2 ( 1999 [employer])   Q1: Little interest or pleasure in doing things 0   Q2: Feeling down, depressed or hopeless 0   PHQ-2 Score 0   Q1: Little interest or pleasure in doing things Not at all   Q2: Feeling down, depressed or hopeless Not at all   PHQ-2 Score 0            No data to display                 Mammogram Screening - After age 74- determine frequency with patient based on health status, life expectancy and patient goals      History of abnormal Pap smear: NO - age 63 - see link Cervical Cytology Screening Guidelines       ASCVD Risk   The 10-year ASCVD risk score (Johanna MAYEN, et al., 2019) is: 18.1%    Values used to calculate the score:      Age: 74 years      Sex: Female      Is Non- : No      Diabetic: No      Tobacco smoker: No      Systolic Blood Pressure: 128 mmHg      Is BP treated: No      HDL Cholesterol: 76 mg/dL      Total Cholesterol: 204 mg/dL      Reviewed and updated as needed this visit by Provider   Tobacco  Allergies  Meds  Problems  Med Hx  Surg Hx  Fam Hx            Past Medical History:   Diagnosis Date    Allergy to cats     nasal symptoms    Arthritis 2015    x-ray    Family history of ischemic heart disease     parents and brother with CAD onset age 60s    Hidradenitis     recurrent, groin    Influenza with other manifestations 1972    hospitalized    Menopause 1993    age 43    Mixed hyperlipidemia     Osteopenia 2003 2014 FRAX: 12% risk major osteoporotic fx, 2.2% risk hip fx    Status post bunionectomy 05/2012    right with hammertoe repairs     Past Surgical History:   Procedure Laterality Date    BUNIONECTOMY Left 4/24/2019    Procedure:  LEFT HALLIX VAGUS AND SECOND CLAWTOE RECONSTRUCTION;  Surgeon: Tyler Jameson MD;  Location:  OR    BUNIONECTOMY KATIE  2012    Procedure:BUNIONECTOMY KATIE; RIGHT FOOT EULA KATIE BUNIONECTOMY, HAMMER TOE RECONSTRUCTION DIGITS TWO, THREE, FOUR AND FIVE; Surgeon:DEANDRE MONTE; [facility]: [facility]    C DEXA INTERPRETATION, AXIAL  2003    T score femur -1.3, lumbar -1.0    C DEXA INTERPRETATION, AXIAL  2004    T score lumbar -1.2, femoral -1.4    C DEXA INTERPRETATION, AXIAL  2007    T score lumbar -1.5 (decline -2.8%/yr), femoral neck -1.3/-1.8 (decline -1.8%/yr)    COLONOSCOPY      COLONOSCOPY  2000    normal, repeat 10 years    COLONOSCOPY  10/2010    normal, repeat 5 years    DEXA  10/2010    T score lumbar -1.2(stable), femoral neck -1.8/-2.2 with BMD 0.755 (6% worsening)    HC TOOTH EXTRACTION W/FORCEP      wisdom teeth extracted    REPAIR HAMMER TOE  2012    Procedure:REPAIR HAMMER TOE; Surgeon:DEANDRE MONTE; Location: [facility]    STRESS ECHO  10/2010    normal LV function at rest, hyperdynamic with exercise, EF 55-60% at rest and 70% post exercise    STRESS ECHO  10/2010    negative for echo/EKG changes of ischemia, normal LV fct with EF 55-60%, systolic fct hyperdynamic with exercise, exercised 7 min, EK.5 mm upsloping ST depression inf lead    STRESS ECHO ([facility])  2004    negative, exercise 8-9 min    [facility] DEXA INTERPRETATION, AXIAL  2014    T score L1-4 of -1.4 (dec 2.7%), femoral neck -1.9/-2.2 with BMD 0.728 (dec 0.8%)     OB History   No obstetric history on file.     Lab work is in process  Labs reviewed in EPIC  BP Readings from Last 3 Encounters:   24 128/85   23 (!) 142/84   23 (!) 146/62    Wt Readings from Last 3 Encounters:   23 56.2 kg (124 lb)   23 57.2 kg (126 lb)   23 57.2 kg (126 lb)                  Patient Active Problem List   Diagnosis    Family history of ischemic heart disease    Osteopenia    Hidradenitis     Hyperlipidemia LDL goal <130    Advanced directives, counseling/discussion    Family history of polyps in the colon    Palpitations    PVC's (premature ventricular contractions)    Hallux valgus of left foot    Chronic cough    Gastroesophageal reflux disease, unspecified whether esophagitis present    Pain of right hand    Numbness and tingling in right hand    Seasonal allergic rhinitis due to other allergic trigger    H. 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)    Closed fracture of right ankle with routine healing S/P ORIF at [facility] in 12/2023     Past Surgical History:   Procedure Laterality Date    BUNIONECTOMY Left 4/24/2019    Procedure: LEFT HALLIX VAGUS AND SECOND CLAWTOE RECONSTRUCTION;  Surgeon: Tyler Jameson MD;  Location:  OR    BUNIONECTOMY [place]  5/14/2012    Procedure:BUNIONECTOMY KATIE; RIGHT FOOT EULA KATIE BUNIONECTOMY, HAMMER TOE RECONSTRUCTION DIGITS TWO, THREE, FOUR AND FIVE; Surgeon:DEANDRE MONTE; [facility]: [facility]    C DEXA INTERPRETATION, AXIAL  8/2003    T score femur -1.3, lumbar -1.0    C DEXA INTERPRETATION, AXIAL  8/2004    T score lumbar -1.2, femoral -1.4    C DEXA INTERPRETATION, AXIAL  11/2007    T score lumbar -1.5 (decline -2.8%/yr), femoral neck -1.3/-1.8 (decline -1.8%/yr)    COLONOSCOPY  1993    COLONOSCOPY  9/2000    normal, repeat 10 years    COLONOSCOPY  10/2010    normal, repeat 5 years    DEXA  10/2010    T score lumbar -1.2(stable), femoral neck -1.8/-2.2 with BMD 0.755 (6% worsening)    HC TOOTH EXTRACTION W/FORCEP  1973    wisdom teeth extracted    REPAIR HAMMER TOE  5/14/2012    Procedure:REPAIR HAMMER TOE; Surgeon:DEANDRE MONTE; Location: [facility]    STRESS ECHO  10/2010    normal LV function at rest, hyperdynamic with exercise, EF 55-60% at rest and 70% post exercise    STRESS ECHO  10/2010    negative for echo/EKG changes of ischemia, normal LV fct with EF 55-60%, systolic fct  hyperdynamic with exercise, exercised 7 min, EK.5 mm upsloping ST depression inf lead    STRESS ECHO ([facility]RO)  2004    negative, exercise 8-9 min    [facility] DEXA INTERPRETATION, AXIAL  2014    T score L1-4 of -1.4 (dec 2.7%), femoral neck -1.9/-2.2 with BMD 0.728 (dec 0.8%)       Social History     Tobacco Use    Smoking status: Never    Smokeless tobacco: Never   Substance Use Topics    Alcohol use: Not Currently     Comment: Wine occasionally     Family History   Problem Relation Age of Onset    C.A.D. Father         MI age 61,   age 76 from MI    Diabetes Father     Cancer Father         esophagus, former smoker    Obesity Father     Coronary Artery Disease Father             Other Cancer Father     C.A.D. Mother         MI age 62    Lipids Mother     Blood Disease Mother         pernicious anemia    Thyroid Disease Mother     Eye Disorder Mother         macular degeneration    Osteoporosis Mother     Connective Tissue Disorder Mother         SLE - remission    Hypertension Mother     Hyperlipidemia Mother     Asthma Mother     Dementia Mother         memory loss, onset early 90s    Coronary Artery Disease Mother             C.A.D. Maternal Grandmother          early 80s    C.A.D. Maternal Grandfather          at younger age    C.A.D. Paternal Grandmother          late 70s    Hypertension Brother     Prostate Cancer Brother     Colon Polyps Brother         sister may also have colon polyps    Coronary Artery Disease Brother         angioplasty    Asthma Brother     Obesity Brother         No longer obese    Arrhythmia Brother     Hyperlipidemia Sister     Osteoporosis Sister         she has osteopenia    Colon Polyps Sister         possible         Current Outpatient Medications   Medication Sig Dispense Refill    albuterol (PROAIR HFA/PROVENTIL HFA/VENTOLIN HFA) 108 (90 Base) MCG/ACT inhaler Inhale 2 puffs into the lungs every 6 hours as needed for shortness of breath, wheezing  or cough 18 g 0    amoxicillin-clavulanate (AUGMENTIN) 875-125 MG tablet Take 1 tablet by mouth 2 times daily 14 tablet 0    predniSONE (DELTASONE) 20 MG tablet Take 2 tablets (40 mg) by mouth daily With breakfast 10 tablet 0    atorvastatin (LIPITOR) 20 MG tablet Take 1 tablet (20 mg) by mouth daily 90 tablet 0    Calcium-Vitamin D-Vitamin K 500-1000-40 MG-UNT-MCG CHEW Take 2 chew tab by mouth 2 times daily (Gummie)      carboxymethylcellulose PF (REFRESH PLUS) 0.5 % SOLN ophthalmic solution Place 1-2 drops into both eyes At Bedtime      Coenzyme Q10-Vitamin E (QUNOL ULTRA COQ10 PO) Take 2 teaspoonful by mouth daily      cyanocobalamin (VITAMIN B-12) 1000 MCG tablet Take 1,000 mcg by mouth daily 2500 mcg day      fish oil-omega-3 fatty acids 1000 MG capsule Take 2 g by mouth daily 1600 mg daily      melatonin 1 MG TABS Take 2 mg by mouth At Bedtime       Multiple Vitamins-Minerals (PRESERVISION AREDS 2) CHEW       nifedipine 0.2% in white petrolatum 0.2 % OINT ointment APPLY TOPICALLY TWICE DAILY AS DIRECTED      nifedipine 0.2% in white petrolatum 0.2 % OINT ointment Apply topically 2 times daily 100 g 0    omeprazole (PRILOSEC) 20 MG DR capsule Take 1 capsule (20 mg) by mouth 2 times daily 180 capsule 0    polyethylene glycol 0.4%- propylene glycol 0.3% (SYSTANE ULTRA) 0.4-0.3 % SOLN ophthalmic solution Place 1-2 drops into both eyes daily as needed for dry eyes      Probiotic Product (PROBIOTIC-10) CAPS Take 1 capsule by mouth daily      Psyllium (METAMUCIL) 0.36 g CAPS        No Known Allergies  Recent Labs   Lab Test 02/17/23  1150 02/15/22  1041 01/27/22  1556 01/14/21  0942 11/12/19  1003 12/07/17  0854 09/15/16  1023   * 83  --  155* 134*   < > 152*   HDL 76 72  --  72 68   < > 67   TRIG 57 56  --  97 116   < > 70   ALT 31  --  34 28 25   < > 27   CR 0.77  --  0.71 0.75 0.74   < > 0.71   GFRESTIMATED 80  --  89 79 81   < > 81   GFRESTBLACK  --   --   --  >90 >90   < > >90  African American GFR Calc      POTASSIUM 4.5  --  3.9 3.9 4.0   < > 4.0   TSH  --  1.27  --   --   --   --  1.39    < > = values in this interval not displayed.        Current providers sharing in care for this patient include:  Patient Care Team:  Lori Escamilla MD as PCP - General (Internal Medicine)  Lori Escamilla MD as Assigned PCP  Wyatt Washington MD as Assigned Surgical Provider    The following health maintenance items are reviewed in Epic and correct as of today:  Health Maintenance   Topic Date Due    ASTHMA ACTION PLAN  Never done    ASTHMA CONTROL TEST  Never done    RSV VACCINE (Pregnancy & 60+) (1 - 1-dose 60+ series) Never done    MEDICARE ANNUAL WELLNESS VISIT  02/27/2025    ANNUAL REVIEW OF HM ORDERS  02/27/2025    FALL RISK ASSESSMENT  02/27/2025    GLUCOSE  02/17/2026    DTAP/TDAP/TD IMMUNIZATION (3 - Td or Tdap) 01/18/2028    LIPID  02/17/2028    ADVANCE CARE PLANNING  02/25/2028    DEXA  11/19/2033    HEPATITIS C SCREENING  Completed    PHQ-2 (once per calendar year)  Completed    INFLUENZA VACCINE  Completed    Pneumococcal Vaccine: 65+ Years  Completed    ZOSTER IMMUNIZATION  Completed    COVID-19 Vaccine  Completed    IPV IMMUNIZATION  Aged Out    HPV IMMUNIZATION  Aged Out    MENINGITIS IMMUNIZATION  Aged Out    RSV MONOCLONAL ANTIBODY  Aged Out    MAMMO SCREENING  Discontinued    COLORECTAL CANCER SCREENING  Discontinued       Appropriate preventive services were discussed with this patient, including applicable screening as appropriate for fall prevention, nutrition, physical activity, Tobacco-use cessation, weight loss and cognition.  Checklist reviewing preventive services available has been given to the patient.           2/27/2024   Mini Cog   Clock Draw Score 2 Normal   3 Item Recall 2 objects recalled   Mini Cog Total Score 4            No Known Allergies     Past Medical History:   Diagnosis Date    Allergy to cats     nasal symptoms    Arthritis 2015    x-ray    Family history of ischemic heart disease      parents and brother with CAD onset age 60s    Hidradenitis     recurrent, groin    Influenza with other manifestations 1972    hospitalized    Menopause     age 43    Mixed hyperlipidemia     Osteopenia 2003 FRAX: 12% risk major osteoporotic fx, 2.2% risk hip fx    Status post bunionectomy 2012    right with hammertoe repairs       Past Surgical History:   Procedure Laterality Date    BUNIONECTOMY Left 2019    Procedure: LEFT HALLIX VAGUS AND SECOND CLAWTOE RECONSTRUCTION;  Surgeon: Tyler Jameson MD;  Location:  OR    BUNIONECTOMY KATIE  2012    Procedure:BUNIONECTOMY KATIE; RIGHT FOOT EULA KATIE BUNIONECTOMY, HAMMER TOE RECONSTRUCTION DIGITS TWO, THREE, FOUR AND FIVE; Surgeon:DEANDRE MONTE; Location: [facility]    C DEXA INTERPRETATION, AXIAL  2003    T score femur -1.3, lumbar -1.0    C DEXA INTERPRETATION, AXIAL  2004    T score lumbar -1.2, femoral -1.4    C DEXA INTERPRETATION, AXIAL  2007    T score lumbar -1.5 (decline -2.8%/yr), femoral neck -1.3/-1.8 (decline -1.8%/yr)    COLONOSCOPY      COLONOSCOPY  2000    normal, repeat 10 years    COLONOSCOPY  10/2010    normal, repeat 5 years    DEXA  10/2010    T score lumbar -1.2(stable), femoral neck -1.8/-2.2 with BMD 0.755 (6% worsening)    HC TOOTH EXTRACTION W/FORCEP      wisdom teeth extracted    REPAIR HAMMER TOE  2012    Procedure:REPAIR HAMMER TOE; Surgeon:DEANDRE MONTE; Location: [facility]    STRESS ECHO  10/2010    normal LV function at rest, hyperdynamic with exercise, EF 55-60% at rest and 70% post exercise    STRESS ECHO  10/2010    negative for echo/EKG changes of ischemia, normal LV fct with EF 55-60%, systolic fct hyperdynamic with exercise, exercised 7 min, EK.5 mm upsloping ST depression inf lead    STRESS ECHO ([facility])  2004    negative, exercise 8-9 min    [facility] DEXA INTERPRETATION, AXIAL  2014    T score L1-4 of -1.4 (dec 2.7%), femoral neck -1.9/-2.2 with BMD 0.728 (dec 0.8%)        Family History   Problem Relation Age of Onset    C.A.D. Father         MI age 61,   age 76 from MI    Diabetes Father     Cancer Father         esophagus, former smoker    Obesity Father     Coronary Artery Disease Father             Other Cancer Father     C.A.D. Mother         MI age 62    Lipids Mother     Blood Disease Mother         pernicious anemia    Thyroid Disease Mother     Eye Disorder Mother         macular degeneration    Osteoporosis Mother     Connective Tissue Disorder Mother         SLE - remission    Hypertension Mother     Hyperlipidemia Mother     Asthma Mother     Dementia Mother         memory loss, onset early 90s    Coronary Artery Disease Mother             C.A.D. Maternal Grandmother          early 80s    C.A.D. Maternal Grandfather          at younger age    C.A.D. Paternal Grandmother          late 70s    Hypertension Brother     Prostate Cancer Brother     Colon Polyps Brother         sister may also have colon polyps    Coronary Artery Disease Brother         angioplasty    Asthma Brother     Obesity Brother         No longer obese    Arrhythmia Brother     Hyperlipidemia Sister     Osteoporosis Sister         she has osteopenia    Colon Polyps Sister         possible       Social History     Tobacco Use    Smoking status: Never    Smokeless tobacco: Never   Substance Use Topics    Alcohol use: Not Currently     Comment: Wine occasionally        Current Outpatient Medications   Medication    albuterol (PROAIR HFA/PROVENTIL HFA/VENTOLIN HFA) 108 (90 Base) MCG/ACT inhaler    amoxicillin-clavulanate (AUGMENTIN) 875-125 MG tablet    predniSONE (DELTASONE) 20 MG tablet    atorvastatin (LIPITOR) 20 MG tablet    Calcium-Vitamin D-Vitamin K 500-1000-40 MG-UNT-MCG CHEW    carboxymethylcellulose PF (REFRESH PLUS) 0.5 % SOLN ophthalmic solution    Coenzyme Q10-Vitamin E (QUNOL ULTRA COQ10 PO)    cyanocobalamin (VITAMIN B-12) 1000 MCG tablet    fish  oil-omega-3 fatty acids 1000 MG capsule    melatonin 1 MG TABS    Multiple Vitamins-Minerals (PRESERVISION AREDS 2) CHEW    nifedipine 0.2% in white petrolatum 0.2 % OINT ointment    nifedipine 0.2% in white petrolatum 0.2 % OINT ointment    omeprazole (PRILOSEC) 20 MG DR capsule    polyethylene glycol 0.4%- propylene glycol 0.3% (SYSTANE ULTRA) 0.4-0.3 % SOLN ophthalmic solution    Probiotic Product (PROBIOTIC-10) CAPS    Psyllium (METAMUCIL) 0.36 g CAPS     No current facility-administered medications for this visit.          Review of Systems  Constitutional, HEENT, cardiovascular, pulmonary, GI, , musculoskeletal, neuro, skin, endocrine and psych systems are negative, except as otherwise noted.      Objective    /85   Pulse 96   Temp 98.5  F (36.9  C) (Tympanic)   Resp 18   SpO2 95%   There is no height or weight on file to calculate BMI.  Physical Exam   GENERAL: alert and no distress  EYES: Eyes grossly normal to inspection, PERRL and conjunctivae and sclerae normal  HENT: ear canals and TM's normal, nose and mouth without ulcers or lesions  NECK: no adenopathy, no asymmetry, masses, or scars  RESP: lungs clear to auscultation - no rales, rhonchi or wheezes  BREAST: Deferred , Mammogram.   CV: regular rate and rhythm, normal S1 S2, no S3 or S4, no murmur, click or rub, no peripheral edema  ABDOMEN: soft, nontender, no hepatosplenomegaly, no masses and bowel sounds normal  MS: no gross musculoskeletal defects noted, no edema  SKIN: no suspicious lesions or rashes  NEURO: Normal strength and tone, mentation intact and speech normal  PSYCH: mentation appears normal, affect normal/bright    Signed Electronically by: Lori Escamilla MD

## 2024-02-27 NOTE — PATIENT INSTRUCTIONS
As discussed , please do fasting LAB only appointment     Please follow up with Orthopedics for your Rt ankle fracture follow up     Will plan Lung function once your acute episode resolves. >> 4 weeks.     Sent in medications for your current Allergic bronchitis with antibiotic, Steroid , Albuterol inhaler sent to pharmacy.     Placed Echocardiogram for your heart murmur.     Pulmonary Function Test Address :     [facility]  [phone]  / [phone]  [address], [place] [id number] [place] [address] PFT Lab's will distribute Patient Information Packet;   [facility] ([facility]) will contact patient by telephone  Please call the following departments if there are questions or need assistance:  [facility]: [phone]  [facility]: [phone]  [facility]: [phone]  [facility]: [phone]  [facility]: [phone]  [facility]: [phone]    ============  Preventive Care Advice   This is general advice given by our system to help you stay healthy. However, your care team may have specific advice just for you. Please talk to your care team about your preventive care needs.  Nutrition  Eat 5 or more servings of fruits and vegetables each day.  Try wheat bread, brown rice and whole grain pasta (instead of white bread, rice, and pasta).  Get enough calcium and vitamin D. Check the label on foods and aim for 100% of the RDA (recommended daily allowance).  Lifestyle  Exercise at least 150 minutes each week   (30 minutes a day, 5 days a week).  Do muscle strengthening activities 2 days a week. These help control your weight and prevent disease.  No smoking.  Wear sunscreen to prevent skin cancer.  Have a dental exam and cleaning every 6 months.  Yearly exams  See your health care team every year to talk about:  Any changes in your  health.  Any medicines your care team has prescribed.  Preventive care, family planning, and ways to prevent chronic diseases.  Shots (vaccines)   HPV shots (up to age 26), if you've never had them before.  Hepatitis B shots (up to age 59), if you've never had them before.  COVID-19 shot: Get this shot when it's due.  Flu shot: Get a flu shot every year.  Tetanus shot: Get a tetanus shot every 10 years.  Pneumococcal, hepatitis A, and RSV shots: Ask your care team if you need these based on your risk.  Shingles shot (for age 50 and up).  General health tests  Diabetes screening:  Starting at age 35, Get screened for diabetes at least every 3 years.  If you are younger than age 35, ask your care team if you should be screened for diabetes.  Cholesterol test: At age 39, start having a cholesterol test every 5 years, or more often if advised.  Bone density scan (DEXA): At age 50, ask your care team if you should have this scan for osteoporosis (brittle bones).  Hepatitis C: Get tested at least once in your life.  STIs (sexually transmitted infections)  Before age 24: Ask your care team if you should be screened for STIs.  After age 24: Get screened for STIs if you're at risk. You are at risk for STIs (including HIV) if:  You are sexually active with more than one person.  You don't use condoms every time.  You or a partner was diagnosed with a sexually transmitted infection.  If you are at risk for HIV, ask about PrEP medicine to prevent HIV.  Get tested for HIV at least once in your life, whether you are at risk for HIV or not.  Cancer screening tests  Cervical cancer screening: If you have a cervix, begin getting regular cervical cancer screening tests at age 21. Most people who have regular screenings with normal results can stop after age 65. Talk about this with your provider.  Breast cancer scan (mammogram): If you've ever had breasts, begin having regular mammograms starting at age 40. This is a scan to check  for breast cancer.  Colon cancer screening: It is important to start screening for colon cancer at age 45.  Have a colonoscopy test every 10 years (or more often if you're at risk) Or, ask your provider about stool tests like a FIT test every year or Cologuard test every 3 years.  To learn more about your testing options, visit: https://[email or web address]/370424.pdf.  For help making a decision, visit: https://bit.ly/cu75246.  Prostate cancer screening test: If you have a prostate and are age 55 to 69, ask your provider if you would benefit from a yearly prostate cancer screening test.  Lung cancer screening: If you are a current or former smoker age 50 to 80, ask your care team if ongoing lung cancer screenings are right for you.  For informational purposes only. Not to replace the advice of your health care provider. Copyright   2023 [facility]. All rights reserved. Clinically reviewed by the [facility]. [employer] 920019 - REV 01/24.    Hearing Loss: Care Instructions  Overview     Hearing loss is a sudden or slow decrease in how well you hear. It can range from slight to profound. Permanent hearing loss can occur with aging. It also can happen when you are exposed long-term to loud noise. Examples include listening to loud music, riding motorcycles, or being around other loud machines.  Hearing loss can affect your work and home life. It can make you feel lonely or depressed. You may feel that you have lost your independence. But hearing aids and other devices can help you hear better and feel connected to others.  Follow-up care is a key part of your treatment and safety. Be sure to make and go to all appointments, and call your doctor if you are having problems. It's also a good idea to know your test results and keep a list of the medicines you take.  How can you care for yourself at home?  Avoid loud noises whenever possible. This helps keep your hearing from getting  "worse.  Always wear hearing protection around loud noises.  Wear a hearing aid as directed.  A professional can help you pick a hearing aid that will work best for you.  You can also get hearing aids over the counter for mild to moderate hearing loss.  Have hearing tests as your doctor suggests. They can show whether your hearing has changed. Your hearing aid may need to be adjusted.  Use other devices as needed. These may include:  Telephone amplifiers and hearing aids that can connect to a television, stereo, radio, or microphone.  Devices that use lights or vibrations. These alert you to the doorbell, a ringing telephone, or a baby monitor.  Television closed-captioning. This shows the words at the bottom of the screen. Most new TVs can do this.  TTY (text telephone). This lets you type messages back and forth on the telephone instead of talking or listening. These devices are also called TDD. When messages are typed on the keyboard, they are sent over the phone line to a receiving TTY. The message is shown on a monitor.  Use text messaging, social media, and email if it is hard for you to communicate by telephone.  Try to learn a listening technique called speechreading. It is not lipreading. You pay attention to people's gestures, expressions, posture, and tone of voice. These clues can help you understand what a person is saying. Face the person you are talking to, and have them face you. Make sure the lighting is good. You need to see the other person's face clearly.  Think about counseling if you need help to adjust to your hearing loss.  When should you call for help?  Watch closely for changes in your health, and be sure to contact your doctor if:    You think your hearing is getting worse.     You have new symptoms, such as dizziness or nausea.   Where can you learn more?  Go to https://www.healthwise.net/patiented  Enter R798 in the search box to learn more about \"Hearing Loss: Care " "Instructions.\"  Current as of: February 28, 2023               Content Version: 13.8    9011-4905 [employer].   Care instructions adapted under license by your healthcare professional. If you have questions about a medical condition or this instruction, always ask your healthcare professional. [employer] disclaims any warranty or liability for your use of this information.      Bladder Training: Care Instructions  Your Care Instructions     Bladder training is used to treat urge incontinence and stress incontinence. Urge incontinence means that the need to urinate comes on so fast that you can't get to a toilet in time. Stress incontinence means that you leak urine because of pressure on your bladder. For example, it may happen when you laugh, cough, or lift something heavy.  Bladder training can increase how long you can wait before you have to urinate. It can also help your bladder hold more urine. And it can give you better control over the urge to urinate.  It is important to remember that bladder training takes a few weeks to a few months to make a difference. You may not see results right away, but don't give up.  Follow-up care is a key part of your treatment and safety. Be sure to make and go to all appointments, and call your doctor if you are having problems. It's also a good idea to know your test results and keep a list of the medicines you take.  How can you care for yourself at home?  Work with your doctor to come up with a bladder training program that is right for you. You may use one or more of the following methods.  Delayed urination  In the beginning, try to keep from urinating for 5 minutes after you first feel the need to go.  While you wait, take deep, slow breaths to relax. Kegel exercises can also help you delay the need to go to the bathroom.  After some practice, when you can easily wait 5 minutes to urinate, try to wait 10 minutes before you urinate.  Slowly " "increase the waiting period until you are able to control when you have to urinate.  Scheduled urination  Empty your bladder when you first wake up in the morning.  Schedule times throughout the day when you will urinate.  Start by going to the bathroom every hour, even if you don't need to go.  Slowly increase the time between trips to the bathroom.  When you have found a schedule that works well for you, keep doing it.  If you wake up during the night and have to urinate, do it. Apply your schedule to waking hours only.  Kegel exercises  These tighten and strengthen pelvic muscles, which can help you control the flow of urine. (If doing these exercises causes pain, stop doing them and talk with your doctor.) To do Kegel exercises:  Squeeze your muscles as if you were trying not to pass gas. Or squeeze your muscles as if you were stopping the flow of urine. Your belly, legs, and buttocks shouldn't move.  Hold the squeeze for 3 seconds, then relax for 5 to 10 seconds.  Start with 3 seconds, then add 1 second each week until you are able to squeeze for 10 seconds.  Repeat the exercise 10 times a session. Do 3 to 8 sessions a day.  When should you call for help?  Watch closely for changes in your health, and be sure to contact your doctor if:    Your incontinence is getting worse.     You do not get better as expected.   Where can you learn more?  Go to https://www.[employer].net/patiented  Enter V684 in the search box to learn more about \"Bladder Training: Care Instructions.\"  Current as of: February 28, 2023               Content Version: 13.8    0446-2797 [employer].   Care instructions adapted under license by your healthcare professional. If you have questions about a medical condition or this instruction, always ask your healthcare professional. [employer] disclaims any warranty or liability for your use of this information.      Substance Use Disorder: Care Instructions  Overview   "   You can improve your life and health by stopping your use of alcohol or drugs. When you don't drink or use drugs, you may feel and sleep better. You may get along better with your family, friends, and coworkers. There are medicines and programs that can help with substance use disorder.  How can you care for yourself at home?  Here are some ways to help you stay sober and prevent relapse.  If you have been given medicine to help keep you sober or reduce your cravings, be sure to take it exactly as prescribed.  Talk to your doctor about programs that can help you stop using drugs or drinking alcohol.  Do not keep alcohol or drugs in your home.  Plan ahead. Think about what you'll say if other people ask you to drink or use drugs. Try not to spend time with people who drink or use drugs.  Use the time and money spent on drinking or drugs to do something that's important to you.  Preventing a relapse  Have a plan to deal with relapse. Learn to recognize changes in your thinking that lead you to drink or use drugs. Get help before you start to drink or use drugs again.  Try to stay away from situations, friends, or places that may lead you to drink or use drugs.  If you feel the need to drink alcohol or use drugs again, seek help right away. Call a trusted friend or family member. Some people get support from organizations such as Narcotics Anonymous or [employer] or from treatment facilities.  If you relapse, get help as soon as you can. Some people make a plan with another person that outlines what they want that person to do for them if they relapse. The plan usually includes how to handle the relapse and who to notify in case of relapse.  Don't give up. Remember that a relapse doesn't mean that you have failed. Use the experience to learn the triggers that lead you to drink or use drugs. Then quit again. Recovery is a lifelong process. Many people have several relapses before they are able to quit for  "good.  Follow-up care is a key part of your treatment and safety. Be sure to make and go to all appointments, and call your doctor if you are having problems. It's also a good idea to know your test results and keep a list of the medicines you take.  When should you call for help?   Call 911  anytime you think you may need emergency care. For example, call if you or someone else:    Has overdosed or has withdrawal signs. Be sure to tell the emergency workers that you are or someone else is using or trying to quit using drugs. Overdose or withdrawal signs may include:  Losing consciousness.  Seizure.  Seeing or hearing things that aren't there (hallucinations).     Is thinking or talking about suicide or harming others.   Where to get help 24 hours a day, 7 days a week   If you or someone you know talks about suicide, self-harm, a mental health crisis, a substance use crisis, or any other kind of emotional distress, get help right away. You can:    Call the Suicide and Crisis Lifeline at 988.     Call 4-159-273-CSBK (1-640.922.4598).     Text HOME to 754352 to access the Crisis Text Line.   Consider saving these numbers in your phone.  Go to [employer] for more information or to chat online.  Call your doctor now or seek immediate medical care if:    You are having withdrawal symptoms. These may include nausea or vomiting, sweating, shakiness, and anxiety.   Watch closely for changes in your health, and be sure to contact your doctor if:    You have a relapse.     You need more help or support to stop.   Where can you learn more?  Go to https://www.[employer].net/patiented  Enter H573 in the search box to learn more about \"Substance Use Disorder: Care Instructions.\"  Current as of: March 21, 2023               Content Version: 13.8    7069-2076 [employer].   Care instructions adapted under license by your healthcare professional. If you have questions about a medical condition or this instruction, " always ask your healthcare professional. [employer] disclaims any warranty or liability for your use of this information.

## 2024-02-28 NOTE — RESULT ENCOUNTER NOTE
Your X ray is normal, no concerns per radiology review.    Please let me know if you have any questions.  Lori Escamilla MD on 2/27/2024

## 2024-03-05 ENCOUNTER — LAB (OUTPATIENT)
Dept: LAB | Facility: CLINIC | Age: 74
End: 2024-03-05
Payer: COMMERCIAL

## 2024-03-05 DIAGNOSIS — Z00.00 ENCOUNTER FOR MEDICARE ANNUAL WELLNESS EXAM: ICD-10-CM

## 2024-03-05 DIAGNOSIS — E78.5 HYPERLIPIDEMIA LDL GOAL <130: ICD-10-CM

## 2024-03-05 DIAGNOSIS — R20.2 NUMBNESS AND TINGLING IN RIGHT HAND: ICD-10-CM

## 2024-03-05 DIAGNOSIS — E67.3 HYPERVITAMINOSIS D: ICD-10-CM

## 2024-03-05 DIAGNOSIS — R20.0 NUMBNESS AND TINGLING IN RIGHT HAND: ICD-10-CM

## 2024-03-05 DIAGNOSIS — K21.9 GASTROESOPHAGEAL REFLUX DISEASE, UNSPECIFIED WHETHER ESOPHAGITIS PRESENT: ICD-10-CM

## 2024-03-05 LAB
ALBUMIN SERPL BCG-MCNC: 4.4 G/DL (ref 3.5–5.2)
ALP SERPL-CCNC: 52 U/L (ref 40–150)
ALT SERPL W P-5'-P-CCNC: 21 U/L (ref 0–50)
ANION GAP SERPL CALCULATED.3IONS-SCNC: 9 MMOL/L (ref 7–15)
AST SERPL W P-5'-P-CCNC: 19 U/L (ref 0–45)
BILIRUB SERPL-MCNC: 0.4 MG/DL
BUN SERPL-MCNC: 14.2 MG/DL (ref 8–23)
CALCIUM SERPL-MCNC: 9.4 MG/DL (ref 8.8–10.2)
CHLORIDE SERPL-SCNC: 104 MMOL/L (ref 98–107)
CHOLEST SERPL-MCNC: 174 MG/DL
CREAT SERPL-MCNC: 0.92 MG/DL (ref 0.51–0.95)
DEPRECATED HCO3 PLAS-SCNC: 27 MMOL/L (ref 22–29)
EGFRCR SERPLBLD CKD-EPI 2021: 64 ML/MIN/1.73M2
ERYTHROCYTE [DISTWIDTH] IN BLOOD BY AUTOMATED COUNT: 13.1 % (ref 10–15)
FASTING STATUS PATIENT QL REPORTED: YES
GLUCOSE SERPL-MCNC: 93 MG/DL (ref 70–99)
HCT VFR BLD AUTO: 38.1 % (ref 35–47)
HDLC SERPL-MCNC: 66 MG/DL
HGB BLD-MCNC: 12.8 G/DL (ref 11.7–15.7)
LDLC SERPL CALC-MCNC: 73 MG/DL
MCH RBC QN AUTO: 30.7 PG (ref 26.5–33)
MCHC RBC AUTO-ENTMCNC: 33.6 G/DL (ref 31.5–36.5)
MCV RBC AUTO: 91 FL (ref 78–100)
NONHDLC SERPL-MCNC: 108 MG/DL
PLATELET # BLD AUTO: 331 10E3/UL (ref 150–450)
POTASSIUM SERPL-SCNC: 3.8 MMOL/L (ref 3.4–5.3)
PROT SERPL-MCNC: 6.6 G/DL (ref 6.4–8.3)
RBC # BLD AUTO: 4.17 10E6/UL (ref 3.8–5.2)
SODIUM SERPL-SCNC: 140 MMOL/L (ref 135–145)
TRIGL SERPL-MCNC: 175 MG/DL
VIT B12 SERPL-MCNC: 3187 PG/ML (ref 232–1245)
VIT D+METAB SERPL-MCNC: 55 NG/ML (ref 20–50)
WBC # BLD AUTO: 7.7 10E3/UL (ref 4–11)

## 2024-03-05 PROCEDURE — 82306 VITAMIN D 25 HYDROXY: CPT

## 2024-03-05 PROCEDURE — 85027 COMPLETE CBC AUTOMATED: CPT

## 2024-03-05 PROCEDURE — 82607 VITAMIN B-12: CPT

## 2024-03-05 PROCEDURE — 80061 LIPID PANEL: CPT

## 2024-03-05 PROCEDURE — 36415 COLL VENOUS BLD VENIPUNCTURE: CPT

## 2024-03-05 PROCEDURE — 80053 COMPREHEN METABOLIC PANEL: CPT

## 2024-03-12 ENCOUNTER — HOSPITAL ENCOUNTER (OUTPATIENT)
Dept: CARDIOLOGY | Facility: CLINIC | Age: 74
Discharge: HOME | End: 2024-03-12
Attending: INTERNAL MEDICINE | Admitting: INTERNAL MEDICINE
Payer: COMMERCIAL

## 2024-03-12 DIAGNOSIS — I35.1 MILD AORTIC REGURGITATION: ICD-10-CM

## 2024-03-12 DIAGNOSIS — R01.1 HEART MURMUR: ICD-10-CM

## 2024-03-12 LAB — LVEF ECHO: NORMAL

## 2024-03-12 PROCEDURE — 93306 TTE W/DOPPLER COMPLETE: CPT | Mod: 26 | Performed by: INTERNAL MEDICINE

## 2024-03-12 PROCEDURE — 93306 TTE W/DOPPLER COMPLETE: CPT

## 2024-03-14 ENCOUNTER — OFFICE VISIT (OUTPATIENT)
Dept: FAMILY MEDICINE | Facility: CLINIC | Age: 74
End: 2024-03-14
Payer: COMMERCIAL

## 2024-03-14 VITALS
DIASTOLIC BLOOD PRESSURE: 80 MMHG | WEIGHT: 127 LBS | BODY MASS INDEX: 23.98 KG/M2 | TEMPERATURE: 99.1 F | OXYGEN SATURATION: 100 % | SYSTOLIC BLOOD PRESSURE: 150 MMHG | HEIGHT: 61 IN | RESPIRATION RATE: 19 BRPM | HEART RATE: 95 BPM

## 2024-03-14 DIAGNOSIS — M81.0 AGE RELATED OSTEOPOROSIS, UNSPECIFIED PATHOLOGICAL FRACTURE PRESENCE: ICD-10-CM

## 2024-03-14 DIAGNOSIS — J40 BRONCHITIS: ICD-10-CM

## 2024-03-14 DIAGNOSIS — R07.89 CHEST WALL PAIN: ICD-10-CM

## 2024-03-14 DIAGNOSIS — R93.1 ABNORMAL FINDING ON ECHOCARDIOGRAM: Primary | ICD-10-CM

## 2024-03-14 PROCEDURE — 99215 OFFICE O/P EST HI 40 MIN: CPT | Performed by: INTERNAL MEDICINE

## 2024-03-14 RX ORDER — TIZANIDINE 2 MG/1
TABLET ORAL
Qty: 30 TABLET | Refills: 0 | Status: SHIPPED | OUTPATIENT
Start: 2024-03-14 | End: 2024-04-08

## 2024-03-14 ASSESSMENT — ASTHMA QUESTIONNAIRES
ACT_TOTALSCORE: 19
QUESTION_2 LAST FOUR WEEKS HOW OFTEN HAVE YOU HAD SHORTNESS OF BREATH: THREE TO SIX TIMES A WEEK
QUESTION_4 LAST FOUR WEEKS HOW OFTEN HAVE YOU USED YOUR RESCUE INHALER OR NEBULIZER MEDICATION (SUCH AS ALBUTEROL): TWO OR THREE TIMES PER WEEK
QUESTION_3 LAST FOUR WEEKS HOW OFTEN DID YOUR ASTHMA SYMPTOMS (WHEEZING, COUGHING, SHORTNESS OF BREATH, CHEST TIGHTNESS OR PAIN) WAKE YOU UP AT NIGHT OR EARLIER THAN USUAL IN THE MORNING: NOT AT ALL
QUESTION_5 LAST FOUR WEEKS HOW WOULD YOU RATE YOUR ASTHMA CONTROL: SOMEWHAT CONTROLLED
ACT_TOTALSCORE: 19
QUESTION_1 LAST FOUR WEEKS HOW MUCH OF THE TIME DID YOUR ASTHMA KEEP YOU FROM GETTING AS MUCH DONE AT WORK, SCHOOL OR AT HOME: NONE OF THE TIME

## 2024-03-14 ASSESSMENT — PAIN SCALES - GENERAL: PAINLEVEL: NO PAIN (0)

## 2024-03-14 NOTE — PROGRESS NOTES
Assessment and Plan  1. Abnormal finding on echocardiogram  New problem of possible HOCM as per the echocardiogram read of cardiologist stating this.  I have done echocardiogram for this patient given the new heart murmur possible aortic regurgitation, echo report shows normal LVEF at 65% with mild aortic regurgitation but does show possible septal thickness with HOCM possibility and will need cardiac MRI for confirmation on this.  Requested the same with a cardiology referral, explained the plan to the patient which patient understood and agreed with the plan.  Currently denies any symptoms of typical chest pain or dyspnea.  - Adult Cardiology Eval [facility] Referral; Future    2. Chest wall pain  New problem, patient states that she developed a left lower rib wall pain happening intermittently and aggravates as she bends down or tries to reach something which certainly happens and resolved spontaneously.  Physical exam negative for tenderness on palpation of the left lower ribs, the location of the pain is more below appears as left lateral abdominal wall muscle spasm causing this.  Do not suspect any rib fracture due to coughing spells till recently.  Will start on low-dose muscle relaxer with all the side effects explained for improvement.  Patient understood and agreed with the plan.  - tiZANidine (ZANAFLEX) 2 MG tablet; Take 1 tablet at bedtime as needed  Dispense: 30 tablet; Refill: 0    3. Bronchitis  Recent bronchitis for which she was treated with antibiotic and prednisone along with albuterol, patient feels much better at this time though has left lower rib wall pain intermittently but not related to cough.  Please see above for further details.  -Emphasized to keep up the upcoming PFTs ordered during the recent annual physical to make sure there is no COPD before considering scheduled inhalers versus asthma if any which could be causing her recurrent bronchitis.  Patient understood and agreed with the  plan.    4. Age related osteoporosis, unspecified pathological fracture presence  New problem, given DEXA findings done recently . Will start on Fosamax and side effect profile sent in on the result note with follow up instructions.   - alendronate (FOSAMAX) 70 MG tablet; Take 1 tablet (70 mg) by mouth every 7 days  Dispense: 12 tablet; Refill: 1      Patient had multiple questions including the meaning of murmur and also meaning of HOCM for which I went ahead and showed her the normal cardiac anatomy on Internet with images and explained her how her murmur forms as well as what is HOCM though it is not confirmed yet in her but patient was worried and concerned on the effects of this.  Discussed again on the plan and explained her to make her understand along with typed and in detail on AVS below.  Patient had all her questions answered in this visit.      Over 40 minutes spent on reviewing patient chart,  face to face encounter, greater than 50% time spent with plan/cordination of care and documentation as above in my A/P.           Please note that this note consists of symbols derived from keyboarding, dictation and/or voice recognition software. As a result, there may be errors in the script that have gone undetected. Please consider this when interpreting information found in this chart.    Patient Instructions   As discussed given your Echocardiogram findings of Hypertrophic cardiomyopathy possibility >> Will need Cardiac MRI which will need Cardiology on board.     Your chest wall pain is muscle spasm >> will consider low dose muscle relaxor as needed , causes drowsiness. Take at bedtime.       Return in about 6 months (around 9/14/2024), or if symptoms worsen or fail to improve, for If symptoms persist, Follow up of last visit, video visit.    Lori Escamilla MD  [facility] SHAMIKA Oshea is a 74 year old, presenting for the following health issues:  Follow Up (Follow  up from [facility]) and Recheck Medication        3/14/2024     1:47 PM   Additional Questions   Roomed by Keke GUERRERO     History of Present Illness       Reason for visit:  Dr requested a follow-up visit    She eats 2-3 servings of fruits and vegetables daily.She consumes 0 sweetened beverage(s) daily.She exercises with enough effort to increase her heart rate 10 to 19 minutes per day.  She exercises with enough effort to increase her heart rate 5 days per week.   She is taking medications regularly.     Last seen pt on 2/2024.   Will need to discuss Echo results.     No Known Allergies     Past Medical History:   Diagnosis Date    Allergy to cats     nasal symptoms    Arthritis 2015    x-ray    Family history of ischemic heart disease     parents and brother with CAD onset age 60s    Hidradenitis     recurrent, groin    Influenza with other manifestations 1972    hospitalized    Menopause 1993    age 43    Mixed hyperlipidemia     Osteopenia 2003 2014 FRAX: 12% risk major osteoporotic fx, 2.2% risk hip fx    Status post bunionectomy 05/2012    right with hammertoe repairs       Past Surgical History:   Procedure Laterality Date    BUNIONECTOMY Left 4/24/2019    Procedure: LEFT HALLIX VAGUS AND SECOND CLAWTOE RECONSTRUCTION;  Surgeon: Tyler Jameson MD;  Location:  OR    BUNIONECTOMY KATIE  5/14/2012    Procedure:BUNIONECTOMY KATIE; RIGHT FOOT EULA KATIE BUNIONECTOMY, HAMMER TOE RECONSTRUCTION DIGITS TWO, THREE, FOUR AND FIVE; Surgeon:DEANDRE MONTE; Location: [place]    C DEXA INTERPRETATION, AXIAL  8/2003    T score femur -1.3, lumbar -1.0    C DEXA INTERPRETATION, AXIAL  8/2004    T score lumbar -1.2, femoral -1.4    C DEXA INTERPRETATION, AXIAL  11/2007    T score lumbar -1.5 (decline -2.8%/yr), femoral neck -1.3/-1.8 (decline -1.8%/yr)    COLONOSCOPY  1993    COLONOSCOPY  9/2000    normal, repeat 10 years    COLONOSCOPY  10/2010    normal, repeat 5 years    DEXA  10/2010    T score lumbar -1.2(stable),  femoral neck -1.8/-2.2 with BMD 0.755 (6% worsening)    HC TOOTH EXTRACTION W/FORCEP      wisdom teeth extracted    REPAIR HAMMER TOE  2012    Procedure:REPAIR HAMMER TOE; Surgeon:DEANDRE MONTE; Location:[facility]    STRESS ECHO  10/2010    normal LV function at rest, hyperdynamic with exercise, EF 55-60% at rest and 70% post exercise    STRESS ECHO  10/2010    negative for echo/EKG changes of ischemia, normal LV fct with EF 55-60%, systolic fct hyperdynamic with exercise, exercised 7 min, EK.5 mm upsloping ST depression inf lead    STRESS ECHO (METRO)  2004    negative, exercise 8-9 min    ZZC DEXA INTERPRETATION, AXIAL  2014    T score L1-4 of -1.4 (dec 2.7%), femoral neck -1.9/-2.2 with BMD 0.728 (dec 0.8%)       Family History   Problem Relation Age of Onset    C.A.D. Father         MI age 61,   age 76 from MI    Diabetes Father     Cancer Father         esophagus, former smoker    Obesity Father     Coronary Artery Disease Father             Other Cancer Father     C.A.D. Mother         MI age 62    Lipids Mother     Blood Disease Mother         pernicious anemia    Thyroid Disease Mother     Eye Disorder Mother         macular degeneration    Osteoporosis Mother     Connective Tissue Disorder Mother         SLE - remission    Hypertension Mother     Hyperlipidemia Mother     Asthma Mother     Dementia Mother         memory loss, onset early 90s    Coronary Artery Disease Mother             C.A.D. Maternal Grandmother          early 80s    C.A.D. Maternal Grandfather          at younger age    C.A.D. Paternal Grandmother          late 70s    Hypertension Brother     Prostate Cancer Brother     Colon Polyps Brother         sister may also have colon polyps    Coronary Artery Disease Brother         angioplasty    Asthma Brother     Obesity Brother         No longer obese    Arrhythmia Brother     Hyperlipidemia Sister     Osteoporosis Sister         she has  "osteopenia    Colon Polyps Sister         possible       Social History     Tobacco Use    Smoking status: Never    Smokeless tobacco: Never   Substance Use Topics    Alcohol use: Not Currently     Comment: Wine occasionally        Current Outpatient Medications   Medication    tiZANidine (ZANAFLEX) 2 MG tablet    albuterol (PROAIR HFA/PROVENTIL HFA/VENTOLIN HFA) 108 (90 Base) MCG/ACT inhaler    atorvastatin (LIPITOR) 20 MG tablet    carboxymethylcellulose PF (REFRESH PLUS) 0.5 % SOLN ophthalmic solution    melatonin 1 MG TABS    Multiple Vitamins-Minerals (PRESERVISION AREDS 2) CHEW    nifedipine 0.2% in white petrolatum 0.2 % OINT ointment    omeprazole (PRILOSEC) 20 MG DR capsule    polyethylene glycol 0.4%- propylene glycol 0.3% (SYSTANE ULTRA) 0.4-0.3 % SOLN ophthalmic solution    Probiotic Product (PROBIOTIC-10) CAPS    Psyllium (METAMUCIL) 0.36 g CAPS     No current facility-administered medications for this visit.        Review of Systems  Constitutional, HEENT, cardiovascular, pulmonary, GI, , musculoskeletal, neuro, skin, endocrine and psych systems are negative, except as otherwise noted.      Objective    BP (!) 150/80 (BP Location: Left arm, Patient Position: Sitting, Cuff Size: Adult Small)   Pulse 95   Temp 99.1  F (37.3  C) (Tympanic)   Resp 19   Ht 1.55 m (5' 1.02\")   Wt 57.6 kg (127 lb)   SpO2 100%   BMI 23.98 kg/m    Body mass index is 23.98 kg/m .  Physical Exam   GENERAL: alert and no distress  NECK: no adenopathy, no asymmetry, masses, or scars  RESP: lungs clear to auscultation - no rales, rhonchi or wheezes  CV: regular rate and rhythm, normal S1 S2, no S3 or S4, + diastolic murmur, click or rub, no peripheral edema  ABDOMEN: soft, nontender, no hepatosplenomegaly, no masses and bowel sounds normal  MS: no gross musculoskeletal defects noted, no edema      Signed Electronically by: Lori Escamilla MD    "

## 2024-03-14 NOTE — PATIENT INSTRUCTIONS
As discussed given your Echocardiogram findings of Hypertrophic cardiomyopathy possibility >> Will need Cardiac MRI which will need Cardiology on board.     Your chest wall pain is muscle spasm >> will consider low dose muscle relaxor as needed , causes drowsiness. Take at bedtime.

## 2024-03-18 ENCOUNTER — TELEPHONE (OUTPATIENT)
Dept: CARDIOLOGY | Facility: CLINIC | Age: 74
End: 2024-03-18
Payer: COMMERCIAL

## 2024-03-18 NOTE — TELEPHONE ENCOUNTER
[facility] Call Center    Phone Message    May a detailed message be left on voicemail: yes     Reason for Call: Other: Patient had abnormal echocardiogram 3/12, ordered by her PCP. The PCP is recommending a cardiac MRI. Patient is wondering if Dr. Bragg could order this without doing an office visit first since she has seen him in the past. Please call the patient back to discuss.       Action Taken: Other: cardiology    Travel Screening: Not Applicable

## 2024-03-20 DIAGNOSIS — J45.901 BRONCHITIS, ALLERGIC, UNSPECIFIED ASTHMA SEVERITY, WITH ACUTE EXACERBATION: ICD-10-CM

## 2024-03-20 RX ORDER — ALBUTEROL SULFATE 90 UG/1
2 AEROSOL, METERED RESPIRATORY (INHALATION) EVERY 6 HOURS PRN
Qty: 6.7 G | Refills: 1 | Status: SHIPPED | OUTPATIENT
Start: 2024-03-20

## 2024-03-21 ENCOUNTER — OFFICE VISIT (OUTPATIENT)
Dept: CARDIOLOGY | Facility: CLINIC | Age: 74
End: 2024-03-21
Payer: COMMERCIAL

## 2024-03-21 VITALS
OXYGEN SATURATION: 95 % | HEART RATE: 85 BPM | SYSTOLIC BLOOD PRESSURE: 136 MMHG | BODY MASS INDEX: 24.88 KG/M2 | HEIGHT: 61 IN | WEIGHT: 131.8 LBS | DIASTOLIC BLOOD PRESSURE: 84 MMHG

## 2024-03-21 DIAGNOSIS — R93.1 ABNORMAL FINDING ON ECHOCARDIOGRAM: ICD-10-CM

## 2024-03-21 DIAGNOSIS — I45.10 RBBB: ICD-10-CM

## 2024-03-21 DIAGNOSIS — I49.3 PVC'S (PREMATURE VENTRICULAR CONTRACTIONS): ICD-10-CM

## 2024-03-21 DIAGNOSIS — I42.1 HYPERTROPHIC OBSTRUCTIVE CARDIOMYOPATHY (H): Primary | ICD-10-CM

## 2024-03-21 PROCEDURE — 99215 OFFICE O/P EST HI 40 MIN: CPT | Performed by: INTERNAL MEDICINE

## 2024-03-21 NOTE — PROGRESS NOTES
HPI and Plan:     Dorie Gagnon is a 73 y/o female referred for abnormal echocardiogram.  PMD ordered echocardiogram due to heart murmur.      Broke ankle 12/7/23.  Since then has been relatively sedentary.  No regular exercise.  Denies SOB, CHAWLA, palpitations or syncope.  Has some brief episodes of atypical chest discomfort.     Father had MI, mother and brother with heart problems as well.     EKG with PVCs and RBBB  Echocardiogram with mild PARUL and mild-moderate LVH.  Possible HOCM    IVS measuring 1.5 cm.  Mild LVOT gradient of 18.at peak.  PARUL confirmed.  No family history of sudden cardiac death.        Recommend     Cardiac MRI  Event monitor      Today's clinic visit entailed:  Review of the result(s) of each unique test - EKG, echocardiogram  Assessment requiring an independent historian(s) - significant other -   Ordering of each unique test  45 minutes spent by me on the date of the encounter doing chart review, history and exam, documentation and further activities per the note  Provider  Link to MDM Help Grid     The level of medical decision making during this visit was of high complexity.      No orders of the defined types were placed in this encounter.    No orders of the defined types were placed in this encounter.    There are no discontinued medications.      Encounter Diagnosis   Name Primary?    Abnormal finding on echocardiogram        CURRENT MEDICATIONS:  Current Outpatient Medications   Medication Sig Dispense Refill    atorvastatin (LIPITOR) 20 MG tablet Take 1 tablet (20 mg) by mouth daily 90 tablet 0    carboxymethylcellulose PF (REFRESH PLUS) 0.5 % SOLN ophthalmic solution Place 1-2 drops into both eyes At Bedtime      melatonin 1 MG TABS Take 2 mg by mouth At Bedtime       Multiple Vitamins-Minerals (PRESERVISION AREDS 2) CHEW Take by mouth 2 times daily      omeprazole (PRILOSEC) 20 MG DR capsule Take 1 capsule (20 mg) by mouth 2 times daily 180 capsule 0    polyethylene  glycol 0.4%- propylene glycol 0.3% (SYSTANE ULTRA) 0.4-0.3 % SOLN ophthalmic solution Place 1-2 drops into both eyes daily as needed for dry eyes      Probiotic Product (PROBIOTIC-10) CAPS Take 1 capsule by mouth daily      Psyllium (METAMUCIL) 0.36 g CAPS Metamucil powder daily      tiZANidine (ZANAFLEX) 2 MG tablet Take 1 tablet at bedtime as needed 30 tablet 0    albuterol (PROAIR HFA/PROVENTIL HFA/VENTOLIN HFA) 108 (90 Base) MCG/ACT inhaler INHALE 2 PUFFS INTO THE LUNGS EVERY 6 HOURS AS NEEDED FOR SHORTNESS OF BREATH, WHEEZING OR COUGH (Patient not taking: Reported on 3/21/2024) 6.7 g 1    nifedipine 0.2% in white petrolatum 0.2 % OINT ointment APPLY TOPICALLY TWICE DAILY AS DIRECTED         ALLERGIES   No Known Allergies    PAST MEDICAL HISTORY:  Past Medical History:   Diagnosis Date    Allergy to cats     nasal symptoms    Arthritis 2015    x-ray    Family history of ischemic heart disease     parents and brother with CAD onset age 60s    Hidradenitis     recurrent, groin    Influenza with other manifestations 1972    hospitalized    Menopause 1993    age 43    Mixed hyperlipidemia     Osteopenia 2003 2014 FRAX: 12% risk major osteoporotic fx, 2.2% risk hip fx    Status post bunionectomy 05/2012    right with hammertoe repairs       PAST SURGICAL HISTORY:  Past Surgical History:   Procedure Laterality Date    BUNIONECTOMY Left 4/24/2019    Procedure: LEFT HALLIX VAGUS AND SECOND CLAWTOE RECONSTRUCTION;  Surgeon: Tyler Jameson MD;  Location:  OR    BUNIONECTOMY [place]  5/14/2012    Procedure:BUNIONECTOMY KATIE; RIGHT FOOT EULA KATIE BUNIONECTOMY, HAMMER TOE RECONSTRUCTION DIGITS TWO, THREE, FOUR AND FIVE; Surgeon:DEANDRE MONTE; Location: [facility]    C DEXA INTERPRETATION, AXIAL  8/2003    T score femur -1.3, lumbar -1.0    C DEXA INTERPRETATION, AXIAL  8/2004    T score lumbar -1.2, femoral -1.4    C DEXA INTERPRETATION, AXIAL  11/2007    T score lumbar -1.5 (decline -2.8%/yr), femoral neck  -1.3/-1.8 (decline -1.8%/yr)    COLONOSCOPY      COLONOSCOPY  2000    normal, repeat 10 years    COLONOSCOPY  10/2010    normal, repeat 5 years    DEXA  10/2010    T score lumbar -1.2(stable), femoral neck -1.8/-2.2 with BMD 0.755 (6% worsening)    HC TOOTH EXTRACTION W/FORCEP      wisdom teeth extracted    REPAIR HAMMER TOE  2012    Procedure:REPAIR HAMMER TOE; Surgeon:DEANDRE MONTE; Location: [place]    STRESS ECHO  10/2010    normal LV function at rest, hyperdynamic with exercise, EF 55-60% at rest and 70% post exercise    STRESS ECHO  10/2010    negative for echo/EKG changes of ischemia, normal LV fct with EF 55-60%, systolic fct hyperdynamic with exercise, exercised 7 min, EK.5 mm upsloping ST depression inf lead    STRESS ECHO ([facility]RO)  2004    negative, exercise 8-9 min    ZZC DEXA INTERPRETATION, AXIAL  2014    T score L1-4 of -1.4 (dec 2.7%), femoral neck -1.9/-2.2 with BMD 0.728 (dec 0.8%)       FAMILY HISTORY:  Family History   Problem Relation Age of Onset    C.A.D. Father         MI age 61,   age 76 from MI    Diabetes Father     Cancer Father         esophagus, former smoker    Obesity Father     Coronary Artery Disease Father             Other Cancer Father     C.A.D. Mother         MI age 62    Lipids Mother     Blood Disease Mother         pernicious anemia    Thyroid Disease Mother     Eye Disorder Mother         macular degeneration    Osteoporosis Mother     Connective Tissue Disorder Mother         SLE - remission    Hypertension Mother     Hyperlipidemia Mother     Asthma Mother     Dementia Mother         memory loss, onset early 90s    Coronary Artery Disease Mother             C.A.D. Maternal Grandmother          early 80s    C.A.D. Maternal Grandfather          at younger age    C.A.D. Paternal Grandmother          late 70s    Hypertension Brother     Prostate Cancer Brother     Colon Polyps Brother         sister may also have colon  polyps    Coronary Artery Disease Brother         angioplasty    Asthma Brother     Obesity Brother         No longer obese    Arrhythmia Brother     Hyperlipidemia Sister     Osteoporosis Sister         she has osteopenia    Colon Polyps Sister         possible       SOCIAL HISTORY:  Social History     Socioeconomic History    Marital status:      Spouse name: Armond    Number of children: 0    Years of education: 16    Highest education level: None   Occupational History    Occupation: [employer] office     Employer: [employer]   Tobacco Use    Smoking status: Never    Smokeless tobacco: Never   Vaping Use    Vaping Use: Never used   Substance and Sexual Activity    Alcohol use: Yes     Comment: Wine occasionally    Drug use: No    Sexual activity: Not Currently     Partners: Male     Birth control/protection: None     Comment: same partner since 1973   Other Topics Concern     Service No    Blood Transfusions No    Caffeine Concern No    Occupational Exposure No    Hobby Hazards No    Sleep Concern Yes     Comment:  snores, occ. sleep disturbances    Stress Concern Yes     Comment: moderate    Weight Concern No    Special Diet Yes     Comment: ( diabetic)- low carb//soy milk    Back Care No    Exercise Yes     Comment: walking/aerobic 60 min 2 days per week, yoga one hour per week    Bike Helmet No    Seat Belt Yes    Self-Exams No    Parent/sibling w/ CABG, MI or angioplasty before 65F 55M? Yes     Comment: Mother   Social History Narrative     Lives with  who is diabetic.     Social Determinants of Health     Financial Resource Strain: Low Risk  (12/11/2023)    Financial Resource Strain     Within the past 12 months, have you or your family members you live with been unable to get utilities (heat, electricity) when it was really needed?: No   Food Insecurity: Low Risk  (12/11/2023)    Food Insecurity     Within the past 12 months, did you worry that your food would run out  "before you got money to buy more?: No     Within the past 12 months, did the food you bought just not last and you didn t have money to get more?: No   Transportation Needs: Low Risk  (12/11/2023)    Transportation Needs     Within the past 12 months, has lack of transportation kept you from medical appointments, getting your medicines, non-medical meetings or appointments, work, or from getting things that you need?: No   Interpersonal Safety: Low Risk  (12/11/2023)    Interpersonal Safety     Do you feel physically and emotionally safe where you currently live?: Yes     Within the past 12 months, have you been hit, slapped, kicked or otherwise physically hurt by someone?: No     Within the past 12 months, have you been humiliated or emotionally abused in other ways by your partner or ex-partner?: No   Housing Stability: Low Risk  (12/11/2023)    Housing Stability     Do you have housing? : Yes     Are you worried about losing your housing?: No       Review of Systems:  Skin:        Eyes:  Positive for glasses  ENT:       Respiratory:  Negative shortness of breath;dyspnea on exertion;wheezing;sleep apnea  Cardiovascular:  Negative;chest pain;edema;dizziness;lightheadedness;syncope or near-syncope Positive for;palpitations  Gastroenterology: Positive for reflux  Genitourinary:       Musculoskeletal:  Positive for arthritis  Neurologic:       Psychiatric:  Positive for sleep disturbances  Heme/Lymph/Imm:  Negative allergies  Endocrine:  Negative thyroid disorder;diabetes    Physical Exam:  Vitals: /84   Pulse 85   Ht 1.55 m (5' 1.02\")   Wt 59.8 kg (131 lb 12.8 oz)   SpO2 95%   BMI 24.89 kg/m      Constitutional:           Skin:             Head:           Eyes:           Lymph:      ENT:           Neck:           Respiratory:            Cardiac:                                                           GI:           Extremities and Muscular Skeletal:                 Neurological:           Psych:     " "    Recent Lab Results:  LIPID RESULTS:  Lab Results   Component Value Date    CHOL 174 03/05/2024    CHOL 246 (H) 01/14/2021    HDL 66 03/05/2024    HDL 72 01/14/2021    LDL 73 03/05/2024     (H) 01/14/2021    TRIG 175 (H) 03/05/2024    TRIG 97 01/14/2021    CHOLHDLRATIO 3.5 06/25/2015       LIVER ENZYME RESULTS:  Lab Results   Component Value Date    AST 19 03/05/2024    AST 16 01/14/2021    ALT 21 03/05/2024    ALT 28 01/14/2021       CBC RESULTS:  Lab Results   Component Value Date    WBC 7.7 03/05/2024    WBC 5.0 12/07/2017    RBC 4.17 03/05/2024    RBC 4.25 12/07/2017    HGB 12.8 03/05/2024    HGB 13.0 11/20/2018    HCT 38.1 03/05/2024    HCT 39.0 12/07/2017    MCV 91 03/05/2024    MCV 92 12/07/2017    MCH 30.7 03/05/2024    MCH 31.3 12/07/2017    MCHC 33.6 03/05/2024    MCHC 34.1 12/07/2017    RDW 13.1 03/05/2024    RDW 13.8 12/07/2017     03/05/2024     12/07/2017       BMP RESULTS:  Lab Results   Component Value Date     03/05/2024     01/14/2021    POTASSIUM 3.8 03/05/2024    POTASSIUM 3.9 01/27/2022    POTASSIUM 3.9 01/14/2021    CHLORIDE 104 03/05/2024    CHLORIDE 105 01/27/2022    CHLORIDE 106 01/14/2021    CO2 27 03/05/2024    CO2 27 01/27/2022    CO2 27 01/14/2021    ANIONGAP 9 03/05/2024    ANIONGAP 5 01/27/2022    ANIONGAP 5 01/14/2021    GLC 93 03/05/2024    GLC 91 01/27/2022    GLC 99 01/14/2021    BUN 14.2 03/05/2024    BUN 11 01/27/2022    BUN 16 01/14/2021    CR 0.92 03/05/2024    CR 0.75 01/14/2021    GFRESTIMATED 64 03/05/2024    GFRESTIMATED 79 01/14/2021    GFRESTBLACK >90 01/14/2021    KARLA 9.4 03/05/2024    KARLA 9.3 01/14/2021        A1C RESULTS:  No results found for: \"A1C\"    INR RESULTS:  No results found for: \"INR\"        CC  Massimo Bragg MD  [id number]  [place] [id number]                "

## 2024-03-21 NOTE — LETTER
3/21/2024    Lori Escamilla MD  [address]  [place] [address]    RE: Dorie Gagnon       Dear Colleague,     I had the pleasure of seeing Dorie Gagnon in the [facility].  HPI and Plan:     Dorie aGgnon is a 73 y/o female referred for abnormal echocardiogram.  PMD ordered echocardiogram due to heart murmur.      Broke ankle 12/7/23.  Since then has been relatively sedentary.  No regular exercise.  Denies SOB, CHAWLA, palpitations or syncope.  Has some brief episodes of atypical chest discomfort.     Father had MI, mother and brother with heart problems as well.     EKG with PVCs and RBBB  Echocardiogram with mild PARUL and mild-moderate LVH.  Possible HOCM    IVS measuring 1.5 cm.  Mild LVOT gradient of 18.at peak.  PARUL confirmed.  No family history of sudden cardiac death.        Recommend     Cardiac MRI  Event monitor      Today's clinic visit entailed:  Review of the result(s) of each unique test - EKG, echocardiogram  Assessment requiring an independent historian(s) - significant other -   Ordering of each unique test  45 minutes spent by me on the date of the encounter doing chart review, history and exam, documentation and further activities per the note  Provider  Link to MDM Help Grid     The level of medical decision making during this visit was of high complexity.      No orders of the defined types were placed in this encounter.    No orders of the defined types were placed in this encounter.    There are no discontinued medications.      Encounter Diagnosis   Name Primary?    Abnormal finding on echocardiogram        CURRENT MEDICATIONS:  Current Outpatient Medications   Medication Sig Dispense Refill    atorvastatin (LIPITOR) 20 MG tablet Take 1 tablet (20 mg) by mouth daily 90 tablet 0    carboxymethylcellulose PF (REFRESH PLUS) 0.5 % SOLN ophthalmic solution Place 1-2 drops into both eyes At Bedtime      melatonin 1 MG TABS Take 2 mg by mouth At Bedtime        Multiple Vitamins-Minerals (PRESERVISION AREDS 2) CHEW Take by mouth 2 times daily      omeprazole (PRILOSEC) 20 MG DR capsule Take 1 capsule (20 mg) by mouth 2 times daily 180 capsule 0    polyethylene glycol 0.4%- propylene glycol 0.3% (SYSTANE ULTRA) 0.4-0.3 % SOLN ophthalmic solution Place 1-2 drops into both eyes daily as needed for dry eyes      Probiotic Product (PROBIOTIC-10) CAPS Take 1 capsule by mouth daily      Psyllium (METAMUCIL) 0.36 g CAPS Metamucil powder daily      tiZANidine (ZANAFLEX) 2 MG tablet Take 1 tablet at bedtime as needed 30 tablet 0    albuterol (PROAIR HFA/PROVENTIL HFA/VENTOLIN HFA) 108 (90 Base) MCG/ACT inhaler INHALE 2 PUFFS INTO THE LUNGS EVERY 6 HOURS AS NEEDED FOR SHORTNESS OF BREATH, WHEEZING OR COUGH (Patient not taking: Reported on 3/21/2024) 6.7 g 1    nifedipine 0.2% in white petrolatum 0.2 % OINT ointment APPLY TOPICALLY TWICE DAILY AS DIRECTED         ALLERGIES   No Known Allergies    PAST MEDICAL HISTORY:  Past Medical History:   Diagnosis Date    Allergy to cats     nasal symptoms    Arthritis 2015    x-ray    Family history of ischemic heart disease     parents and brother with CAD onset age 60s    Hidradenitis     recurrent, groin    Influenza with other manifestations 1972    hospitalized    Menopause 1993    age 43    Mixed hyperlipidemia     Osteopenia 2003 2014 FRAX: 12% risk major osteoporotic fx, 2.2% risk hip fx    Status post bunionectomy 05/2012    right with hammertoe repairs       PAST SURGICAL HISTORY:  Past Surgical History:   Procedure Laterality Date    BUNIONECTOMY Left 4/24/2019    Procedure: LEFT HALLIX VAGUS AND SECOND CLAWTOE RECONSTRUCTION;  Surgeon: Tyler Jameson MD;  Location:  OR    BUNIONECTOMY KATIE  5/14/2012    Procedure:BUNIONECTOMY KATIE; RIGHT FOOT EULA KATIE BUNIONECTOMY, HAMMER TOE RECONSTRUCTION DIGITS TWO, THREE, FOUR AND FIVE; Surgeon:DEANDRE MONTE; Location: [facility]    C DEXA INTERPRETATION, AXIAL  8/2003    T  score femur -1.3, lumbar -1.0    C DEXA INTERPRETATION, AXIAL  2004    T score lumbar -1.2, femoral -1.4    C DEXA INTERPRETATION, AXIAL  2007    T score lumbar -1.5 (decline -2.8%/yr), femoral neck -1.3/-1.8 (decline -1.8%/yr)    COLONOSCOPY  1993    COLONOSCOPY  2000    normal, repeat 10 years    COLONOSCOPY  10/2010    normal, repeat 5 years    DEXA  10/2010    T score lumbar -1.2(stable), femoral neck -1.8/-2.2 with BMD 0.755 (6% worsening)    HC TOOTH EXTRACTION W/FORCEP      wisdom teeth extracted    REPAIR HAMMER TOE  2012    Procedure:REPAIR HAMMER TOE; Surgeon:DEANDRE MONTE; Location:[facility]    STRESS ECHO  10/2010    normal LV function at rest, hyperdynamic with exercise, EF 55-60% at rest and 70% post exercise    STRESS ECHO  10/2010    negative for echo/EKG changes of ischemia, normal LV fct with EF 55-60%, systolic fct hyperdynamic with exercise, exercised 7 min, EK.5 mm upsloping ST depression inf lead    STRESS ECHO (METRO)  2004    negative, exercise 8-9 min    Z DEXA INTERPRETATION, AXIAL  2014    T score L1-4 of -1.4 (dec 2.7%), femoral neck -1.9/-2.2 with BMD 0.728 (dec 0.8%)       FAMILY HISTORY:  Family History   Problem Relation Age of Onset    C.A.D. Father         MI age 61,   age 76 from MI    Diabetes Father     Cancer Father         esophagus, former smoker    Obesity Father     Coronary Artery Disease Father             Other Cancer Father     C.A.D. Mother         MI age 62    Lipids Mother     Blood Disease Mother         pernicious anemia    Thyroid Disease Mother     Eye Disorder Mother         macular degeneration    Osteoporosis Mother     Connective Tissue Disorder Mother         SLE - remission    Hypertension Mother     Hyperlipidemia Mother     Asthma Mother     Dementia Mother         memory loss, onset early 90s    Coronary Artery Disease Mother             C.A.D. Maternal Grandmother          early 80s    C.A.D. Maternal  Grandfather          at younger age    C.A.D. Paternal Grandmother          late 70s    Hypertension Brother     Prostate Cancer Brother     Colon Polyps Brother         sister may also have colon polyps    Coronary Artery Disease Brother         angioplasty    Asthma Brother     Obesity Brother         No longer obese    Arrhythmia Brother     Hyperlipidemia Sister     Osteoporosis Sister         she has osteopenia    Colon Polyps Sister         possible       SOCIAL HISTORY:  Social History     Socioeconomic History    Marital status:      Spouse name: Armond    Number of children: 0    Years of education: 16    Highest education level: None   Occupational History    Occupation: [employer] office     Employer: [employer]   Tobacco Use    Smoking status: Never    Smokeless tobacco: Never   Vaping Use    Vaping Use: Never used   Substance and Sexual Activity    Alcohol use: Yes     Comment: Wine occasionally    Drug use: No    Sexual activity: Not Currently     Partners: Male     Birth control/protection: None     Comment: same partner since    Other Topics Concern     Service No    Blood Transfusions No    Caffeine Concern No    Occupational Exposure No    Hobby Hazards No    Sleep Concern Yes     Comment:  snores, occ. sleep disturbances    Stress Concern Yes     Comment: moderate    Weight Concern No    Special Diet Yes     Comment: ( diabetic)- low carb//soy milk    Back Care No    Exercise Yes     Comment: walking/aerobic 60 min 2 days per week, yoga one hour per week    Bike Helmet No    Seat Belt Yes    Self-Exams No    Parent/sibling w/ CABG, MI or angioplasty before 65F 55M? Yes     Comment: Mother   Social History Narrative     Lives with  who is diabetic.     Social Determinants of Health     Financial Resource Strain: Low Risk  (2023)    Financial Resource Strain     Within the past 12 months, have you or your family members you live with been unable  "to get utilities (heat, electricity) when it was really needed?: No   Food Insecurity: Low Risk  (12/11/2023)    Food Insecurity     Within the past 12 months, did you worry that your food would run out before you got money to buy more?: No     Within the past 12 months, did the food you bought just not last and you didn t have money to get more?: No   Transportation Needs: Low Risk  (12/11/2023)    Transportation Needs     Within the past 12 months, has lack of transportation kept you from medical appointments, getting your medicines, non-medical meetings or appointments, work, or from getting things that you need?: No   Interpersonal Safety: Low Risk  (12/11/2023)    Interpersonal Safety     Do you feel physically and emotionally safe where you currently live?: Yes     Within the past 12 months, have you been hit, slapped, kicked or otherwise physically hurt by someone?: No     Within the past 12 months, have you been humiliated or emotionally abused in other ways by your partner or ex-partner?: No   Housing Stability: Low Risk  (12/11/2023)    Housing Stability     Do you have housing? : Yes     Are you worried about losing your housing?: No       Review of Systems:  Skin:        Eyes:  Positive for glasses  ENT:       Respiratory:  Negative shortness of breath;dyspnea on exertion;wheezing;sleep apnea  Cardiovascular:  Negative;chest pain;edema;dizziness;lightheadedness;syncope or near-syncope Positive for;palpitations  Gastroenterology: Positive for reflux  Genitourinary:       Musculoskeletal:  Positive for arthritis  Neurologic:       Psychiatric:  Positive for sleep disturbances  Heme/Lymph/Imm:  Negative allergies  Endocrine:  Negative thyroid disorder;diabetes    Physical Exam:  Vitals: /84   Pulse 85   Ht 1.55 m (5' 1.02\")   Wt 59.8 kg (131 lb 12.8 oz)   SpO2 95%   BMI 24.89 kg/m      Constitutional:           Skin:             Head:           Eyes:           Lymph:      ENT:           Neck:     " "      Respiratory:            Cardiac:                                                           GI:           Extremities and Muscular Skeletal:                 Neurological:           Psych:         Recent Lab Results:  LIPID RESULTS:  Lab Results   Component Value Date    CHOL 174 03/05/2024    CHOL 246 (H) 01/14/2021    HDL 66 03/05/2024    HDL 72 01/14/2021    LDL 73 03/05/2024     (H) 01/14/2021    TRIG 175 (H) 03/05/2024    TRIG 97 01/14/2021    CHOLHDLRATIO 3.5 06/25/2015       LIVER ENZYME RESULTS:  Lab Results   Component Value Date    AST 19 03/05/2024    AST 16 01/14/2021    ALT 21 03/05/2024    ALT 28 01/14/2021       CBC RESULTS:  Lab Results   Component Value Date    WBC 7.7 03/05/2024    WBC 5.0 12/07/2017    RBC 4.17 03/05/2024    RBC 4.25 12/07/2017    HGB 12.8 03/05/2024    HGB 13.0 11/20/2018    HCT 38.1 03/05/2024    HCT 39.0 12/07/2017    MCV 91 03/05/2024    MCV 92 12/07/2017    MCH 30.7 03/05/2024    MCH 31.3 12/07/2017    MCHC 33.6 03/05/2024    MCHC 34.1 12/07/2017    RDW 13.1 03/05/2024    RDW 13.8 12/07/2017     03/05/2024     12/07/2017       BMP RESULTS:  Lab Results   Component Value Date     03/05/2024     01/14/2021    POTASSIUM 3.8 03/05/2024    POTASSIUM 3.9 01/27/2022    POTASSIUM 3.9 01/14/2021    CHLORIDE 104 03/05/2024    CHLORIDE 105 01/27/2022    CHLORIDE 106 01/14/2021    CO2 27 03/05/2024    CO2 27 01/27/2022    CO2 27 01/14/2021    ANIONGAP 9 03/05/2024    ANIONGAP 5 01/27/2022    ANIONGAP 5 01/14/2021    GLC 93 03/05/2024    GLC 91 01/27/2022    GLC 99 01/14/2021    BUN 14.2 03/05/2024    BUN 11 01/27/2022    BUN 16 01/14/2021    CR 0.92 03/05/2024    CR 0.75 01/14/2021    GFRESTIMATED 64 03/05/2024    GFRESTIMATED 79 01/14/2021    GFRESTBLACK >90 01/14/2021    KARLA 9.4 03/05/2024    KARLA 9.3 01/14/2021        A1C RESULTS:  No results found for: \"A1C\"    INR RESULTS:  No results found for: \"INR\"        CC  Massimo Bragg MD  [id number]  [place] [address]      Thank you for allowing me to participate in the care of your patient.      Sincerely,     ARYAN BOND MD     [facility]

## 2024-03-22 ENCOUNTER — HOSPITAL ENCOUNTER (OUTPATIENT)
Dept: CARDIOLOGY | Facility: CLINIC | Age: 74
Discharge: HOME | End: 2024-03-22
Attending: INTERNAL MEDICINE
Payer: COMMERCIAL

## 2024-03-22 ENCOUNTER — HOSPITAL ENCOUNTER (OUTPATIENT)
Dept: BONE DENSITY | Facility: CLINIC | Age: 74
Discharge: HOME | End: 2024-03-22
Attending: INTERNAL MEDICINE
Payer: COMMERCIAL

## 2024-03-22 ENCOUNTER — HOSPITAL ENCOUNTER (OUTPATIENT)
Dept: MAMMOGRAPHY | Facility: CLINIC | Age: 74
Discharge: HOME | End: 2024-03-22
Attending: INTERNAL MEDICINE
Payer: COMMERCIAL

## 2024-03-22 VITALS — HEART RATE: 80 BPM | DIASTOLIC BLOOD PRESSURE: 89 MMHG | SYSTOLIC BLOOD PRESSURE: 138 MMHG

## 2024-03-22 DIAGNOSIS — I45.10 RBBB: ICD-10-CM

## 2024-03-22 DIAGNOSIS — I42.1 HYPERTROPHIC OBSTRUCTIVE CARDIOMYOPATHY (H): ICD-10-CM

## 2024-03-22 DIAGNOSIS — I49.3 PVC'S (PREMATURE VENTRICULAR CONTRACTIONS): ICD-10-CM

## 2024-03-22 DIAGNOSIS — Z12.31 BREAST CANCER SCREENING BY MAMMOGRAM: ICD-10-CM

## 2024-03-22 DIAGNOSIS — S82.891D CLOSED FRACTURE OF RIGHT ANKLE WITH ROUTINE HEALING: ICD-10-CM

## 2024-03-22 DIAGNOSIS — R93.1 ABNORMAL FINDING ON ECHOCARDIOGRAM: ICD-10-CM

## 2024-03-22 DIAGNOSIS — Z78.0 ASYMPTOMATIC MENOPAUSE: ICD-10-CM

## 2024-03-22 PROCEDURE — 75561 CARDIAC MRI FOR MORPH W/DYE: CPT

## 2024-03-22 PROCEDURE — 255N000002 HC RX 255 OP 636: Performed by: INTERNAL MEDICINE

## 2024-03-22 PROCEDURE — 75561 CARDIAC MRI FOR MORPH W/DYE: CPT | Mod: 26 | Performed by: INTERNAL MEDICINE

## 2024-03-22 PROCEDURE — A9585 GADOBUTROL INJECTION: HCPCS | Performed by: INTERNAL MEDICINE

## 2024-03-22 PROCEDURE — 77063 BREAST TOMOSYNTHESIS BI: CPT

## 2024-03-22 PROCEDURE — 77080 DXA BONE DENSITY AXIAL: CPT

## 2024-03-22 RX ORDER — ACYCLOVIR 200 MG/1
0-1 CAPSULE ORAL
Status: DISCONTINUED | OUTPATIENT
Start: 2024-03-22 | End: 2024-03-23 | Stop reason: HOSPADM

## 2024-03-22 RX ORDER — METHYLPREDNISOLONE SODIUM SUCCINATE 125 MG/2ML
125 INJECTION, POWDER, LYOPHILIZED, FOR SOLUTION INTRAMUSCULAR; INTRAVENOUS
Status: DISCONTINUED | OUTPATIENT
Start: 2024-03-22 | End: 2024-03-23 | Stop reason: HOSPADM

## 2024-03-22 RX ORDER — GADOBUTROL 604.72 MG/ML
7.5 INJECTION INTRAVENOUS ONCE
Status: COMPLETED | OUTPATIENT
Start: 2024-03-22 | End: 2024-03-22

## 2024-03-22 RX ORDER — DIPHENHYDRAMINE HYDROCHLORIDE 50 MG/ML
25-50 INJECTION INTRAMUSCULAR; INTRAVENOUS
Status: DISCONTINUED | OUTPATIENT
Start: 2024-03-22 | End: 2024-03-23 | Stop reason: HOSPADM

## 2024-03-22 RX ORDER — DIPHENHYDRAMINE HCL 25 MG
25 CAPSULE ORAL
Status: DISCONTINUED | OUTPATIENT
Start: 2024-03-22 | End: 2024-03-23 | Stop reason: HOSPADM

## 2024-03-22 RX ORDER — DIAZEPAM 5 MG
5 TABLET ORAL EVERY 30 MIN PRN
Status: DISCONTINUED | OUTPATIENT
Start: 2024-03-22 | End: 2024-03-23 | Stop reason: HOSPADM

## 2024-03-22 RX ORDER — ONDANSETRON 2 MG/ML
4 INJECTION INTRAMUSCULAR; INTRAVENOUS
Status: DISCONTINUED | OUTPATIENT
Start: 2024-03-22 | End: 2024-03-23 | Stop reason: HOSPADM

## 2024-03-22 RX ADMIN — GADOBUTROL 7.5 ML: 604.72 INJECTION INTRAVENOUS at 14:02

## 2024-03-25 RX ORDER — ALENDRONATE SODIUM 70 MG/1
70 TABLET ORAL
Qty: 12 TABLET | Refills: 1 | Status: SHIPPED | OUTPATIENT
Start: 2024-03-25 | End: 2024-07-16

## 2024-03-26 NOTE — RESULT ENCOUNTER NOTE
Your DEXA scan is positive for Osteoporosis with high risk of fracture. You will need to be on medication called Fosamax once a week for improvement. Please take Vitamin D 2000 units daily in addition.   The medication needs to be taken as instructed by the pharmacist and also need to let your dentist know about it , for any plans of dental procedures he will need to stop the medication before planned procedures. Will need follow up in 3 month on the scheduling link sent.     Please let me know if you have any questions.   Lori Escamilla MD on 3/25/2024

## 2024-03-29 DIAGNOSIS — I42.1 HYPERTROPHIC OBSTRUCTIVE CARDIOMYOPATHY (H): Primary | ICD-10-CM

## 2024-04-02 ENCOUNTER — HOSPITAL ENCOUNTER (OUTPATIENT)
Dept: CARDIOLOGY | Facility: CLINIC | Age: 74
Discharge: HOME | End: 2024-04-02
Attending: INTERNAL MEDICINE | Admitting: INTERNAL MEDICINE
Payer: COMMERCIAL

## 2024-04-02 DIAGNOSIS — I49.3 PVC'S (PREMATURE VENTRICULAR CONTRACTIONS): ICD-10-CM

## 2024-04-02 DIAGNOSIS — R93.1 ABNORMAL FINDING ON ECHOCARDIOGRAM: ICD-10-CM

## 2024-04-02 DIAGNOSIS — I42.1 HYPERTROPHIC OBSTRUCTIVE CARDIOMYOPATHY (H): ICD-10-CM

## 2024-04-02 DIAGNOSIS — I45.10 RBBB: ICD-10-CM

## 2024-04-02 PROCEDURE — 93272 ECG/REVIEW INTERPRET ONLY: CPT | Performed by: INTERNAL MEDICINE

## 2024-04-02 PROCEDURE — 93270 REMOTE 30 DAY ECG REV/REPORT: CPT

## 2024-04-08 ENCOUNTER — OFFICE VISIT (OUTPATIENT)
Dept: NURSING | Facility: CLINIC | Age: 74
End: 2024-04-08
Attending: INTERNAL MEDICINE
Payer: COMMERCIAL

## 2024-04-08 VITALS — BODY MASS INDEX: 24.74 KG/M2 | HEART RATE: 79 BPM | WEIGHT: 131 LBS | OXYGEN SATURATION: 97 %

## 2024-04-08 DIAGNOSIS — J45.901 BRONCHITIS, ALLERGIC, UNSPECIFIED ASTHMA SEVERITY, WITH ACUTE EXACERBATION: ICD-10-CM

## 2024-04-08 DIAGNOSIS — R07.89 CHEST WALL PAIN: ICD-10-CM

## 2024-04-08 DIAGNOSIS — R05.3 CHRONIC COUGH: ICD-10-CM

## 2024-04-08 PROCEDURE — 94726 PLETHYSMOGRAPHY LUNG VOLUMES: CPT | Performed by: INTERNAL MEDICINE

## 2024-04-08 PROCEDURE — 94729 DIFFUSING CAPACITY: CPT | Performed by: INTERNAL MEDICINE

## 2024-04-08 PROCEDURE — 94375 RESPIRATORY FLOW VOLUME LOOP: CPT | Performed by: INTERNAL MEDICINE

## 2024-04-08 RX ORDER — TIZANIDINE 2 MG/1
TABLET ORAL
Qty: 30 TABLET | Refills: 0 | Status: SHIPPED | OUTPATIENT
Start: 2024-04-08

## 2024-04-09 LAB
DLCOUNC-%PRED-PRE: 97 %
DLCOUNC-PRE: 16.59 ML/MIN/MMHG
DLCOUNC-PRED: 16.94 ML/MIN/MMHG
ERV-%PRED-PRE: 69 %
ERV-PRE: 0.54 L
ERV-PRED: 0.78 L
EXPTIME-PRE: 6.3 SEC
FEF2575-%PRED-PRE: 148 %
FEF2575-PRE: 2.17 L/SEC
FEF2575-PRED: 1.46 L/SEC
FEFMAX-%PRED-PRE: 69 %
FEFMAX-PRE: 3.2 L/SEC
FEFMAX-PRED: 4.62 L/SEC
FEV1-%PRED-PRE: 103 %
FEV1-PRE: 1.74 L
FEV1FEV6-PRE: 83 %
FEV1FEV6-PRED: 78 %
FEV1FVC-PRE: 83 %
FEV1FVC-PRED: 79 %
FEV1SVC-PRE: 73 %
FEV1SVC-PRED: 64 %
FIFMAX-PRE: 2.38 L/SEC
FRCPLETH-%PRED-PRE: 90 %
FRCPLETH-PRE: 2.27 L
FRCPLETH-PRED: 2.52 L
FVC-%PRED-PRE: 97 %
FVC-PRE: 2.1 L
FVC-PRED: 2.15 L
IC-%PRED-PRE: 119 %
IC-PRE: 1.85 L
IC-PRED: 1.55 L
RVPLETH-%PRED-PRE: 86 %
RVPLETH-PRE: 1.73 L
RVPLETH-PRED: 2 L
TLCPLETH-%PRED-PRE: 94 %
TLCPLETH-PRE: 4.12 L
TLCPLETH-PRED: 4.35 L
VA-%PRED-PRE: 98 %
VA-PRE: 3.89 L
VC-%PRED-PRE: 90 %
VC-PRE: 2.39 L
VC-PRED: 2.64 L

## 2024-04-19 ENCOUNTER — TRANSFERRED RECORDS (OUTPATIENT)
Dept: HEALTH INFORMATION MANAGEMENT | Facility: CLINIC | Age: 74
End: 2024-04-19
Payer: COMMERCIAL

## 2024-04-19 DIAGNOSIS — E78.5 HYPERLIPIDEMIA LDL GOAL <130: ICD-10-CM

## 2024-04-19 RX ORDER — ATORVASTATIN CALCIUM 20 MG/1
20 TABLET, FILM COATED ORAL DAILY
Qty: 90 TABLET | Refills: 0 | Status: SHIPPED | OUTPATIENT
Start: 2024-04-19 | End: 2024-07-12

## 2024-05-08 ENCOUNTER — MYC REFILL (OUTPATIENT)
Dept: FAMILY MEDICINE | Facility: CLINIC | Age: 74
End: 2024-05-08
Payer: COMMERCIAL

## 2024-05-08 ENCOUNTER — TELEPHONE (OUTPATIENT)
Dept: CARDIOLOGY | Facility: CLINIC | Age: 74
End: 2024-05-08
Payer: COMMERCIAL

## 2024-05-08 DIAGNOSIS — K21.9 GASTROESOPHAGEAL REFLUX DISEASE, UNSPECIFIED WHETHER ESOPHAGITIS PRESENT: ICD-10-CM

## 2024-05-08 NOTE — TELEPHONE ENCOUNTER
[facility] Call Center    Phone Message    May a detailed message be left on voicemail: yes     Reason for Call: Other: Patient has not heard from anyone in regards to results for their heart monitor they wore on 04/02, and wanting to make sure everything is ok. Please call patient back to further discuss.     Action Taken: Other: Cardiology    Travel Screening: Not Applicable    Thank you!  [facility]

## 2024-05-08 NOTE — TELEPHONE ENCOUNTER
Prescription approved per [facility] Refill Protocol.  Lizy Mason, RN  [facility] Triage Nurse

## 2024-06-18 ENCOUNTER — OFFICE VISIT (OUTPATIENT)
Dept: CARDIOLOGY | Facility: CLINIC | Age: 74
End: 2024-06-18
Attending: INTERNAL MEDICINE
Payer: COMMERCIAL

## 2024-06-18 VITALS
SYSTOLIC BLOOD PRESSURE: 156 MMHG | OXYGEN SATURATION: 98 % | BODY MASS INDEX: 25.64 KG/M2 | HEIGHT: 61 IN | HEART RATE: 74 BPM | WEIGHT: 135.8 LBS | DIASTOLIC BLOOD PRESSURE: 78 MMHG

## 2024-06-18 DIAGNOSIS — I42.1 HYPERTROPHIC OBSTRUCTIVE CARDIOMYOPATHY (H): ICD-10-CM

## 2024-06-18 DIAGNOSIS — I49.3 PVC (PREMATURE VENTRICULAR CONTRACTION): Primary | ICD-10-CM

## 2024-06-18 PROCEDURE — 99215 OFFICE O/P EST HI 40 MIN: CPT | Performed by: PHYSICIAN ASSISTANT

## 2024-06-18 NOTE — Clinical Note
Looks like your patient may meet criteria for HCM based on MRI. Shall I stress her to see what her gradient does? It is 18 at rest. She's very active without symptoms, goes up and down the stairs 15-20 times per day and walks 1 mile.

## 2024-06-18 NOTE — LETTER
6/18/2024    Lori Escamilla MD  [address]  [place] [address]    RE: Dorie Gagnon       Dear Colleague,     I had the pleasure of seeing Dorie Gagnon in the [facility].          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 No history of syncope. No family history of sudden cardiac death. By definition she has hypertrophic cardiomyopathy. A diagnosis of HCM is made with LV wall thickness greater than or equal to 15 mm. Fortunately LVOT gradient is 18 mm at rest.   Holter monitor reassuring that there's no high burden of PVCs and no NSVT  Exercise echo to assess LVOT gradient   If chest pain in the future, can consider non-dihydropyridine calcium channel blocker  Follow up in 1 year with Dr. Bragg   Elevated blood pressure reading, an outlier. Will monitor.       Return to care in 1 year with Dr Bragg.      History of Present Illness/Subjective    Dorie Gagnon is a 74 year old female with past medical history significant for:  Systolic anterior motion of mitral valve  LV outflow obstruction, peak gradient 18 mmHg  Possible Hypertrophic Cardiomyopathy  Aortic regurgitation, mild  PVCs  RBBB  Moderate LVH  Hyperlipidemia     The patient was last seen in in clinic in March by Dr Bragg.  At that time she was mostly sedentary due to an ankle injury but denies shortness of breath, dyspnea, palpitations or syncope.  Cardiac MRI was concerning for hypertrophic cardiomyopathy.  An event monitor was ordered to evaluate for ventricular arrhythmia.  Addressed does not show any ventricular arrhythmia.    Today the patient tells me she's doing well, no concerns. Her ankle  "has recovered and she's trying to do more walking and work around the house. She goes up and down the stairs multiple times per day. She can walk a mile around a park nearby and ascends the stairs \"all day\", more than 15 times per day. With her current level of workload she doesn't feel limitations, has slowed down due to her ankle. No chest pain or pressure. Denies dyspnea or SOB. No dizziness, lightheadedness, pre-syncope, syncope or falls. No palpitations or racing heart but does feel her PVCs. No leg swelling. Sleeping well, no orthopnea or PND, no blood in stool or urine.        Data Review Today:     MRI 3/22/204:  CONCLUSIONS:   1. Normal to hyperdynamic LV systolic function with LVEF of 77%.  2. Late gadolinium enhancement imaging shows no MI, fibrosis or infiltrative disease  3. There is asymmetric septal hypertrophy with basal septal wall measuring 1.5 cm.  Mitral valve systolic  anterior motion noted. Dephasing noted in LVOT on cine images indicating dynamic LVOT obstruction.  Together these findings may represent hypertrophic cardiomyopathy, can consider genetic testing as  clinically appropriate    TTE 6/7/2022:  1. Normal biventricular size and function. Left ventricular ejection fraction  of 60-65%.  2. No segmental wall motion abnormalities noted.  3. No hemodynamically significant valvular disease.  No prior study for comparison. Technically adequate study.  ________________________________________________      I have reviewed and updated the patient's past medical history, allergy list and medication list.          Physical Examination   Vitals: There were no vitals taken for this visit.    BMI= There is no height or weight on file to calculate BMI.    Wt Readings from Last 3 Encounters:   04/08/24 59.4 kg (131 lb)   03/21/24 59.8 kg (131 lb 12.8 oz)   03/14/24 57.6 kg (127 lb)       General :   Alert and oriented, in no acute distress.    HEENT:  Normocephalic and atraumatic. .    Neck: No JVP, " carotid bruit or obvious thyromegaly.   Lungs:   Respirations unlabored. Clear bilaterally with no rales, rhonchi, or wheezes.     Cardiovascular:   Rhythm is regular. S1 and S2 are normal. No significant murmur is present. Lower extremities demonstrate no significant edema.        Skin: Skin is warm, dry, and otherwise intact.   Neurologic: Gait not assessed. Mood and affect appropriate.           Medical History  Surgical History Family History Social History   Past Medical History:   Diagnosis Date    Allergy to cats     nasal symptoms    Arthritis 2015    x-ray    Family history of ischemic heart disease     parents and brother with CAD onset age 60s    Hidradenitis     recurrent, groin    Influenza with other manifestations 1972    hospitalized    Menopause 1993    age 43    Mixed hyperlipidemia     Osteopenia 2003 2014 FRAX: 12% risk major osteoporotic fx, 2.2% risk hip fx    Status post bunionectomy 05/2012    right with hammertoe repairs    Past Surgical History:   Procedure Laterality Date    BUNIONECTOMY Left 4/24/2019    Procedure: LEFT HALLIX VAGUS AND SECOND CLAWTOE RECONSTRUCTION;  Surgeon: Tyler Jameson MD;  Location:  OR    BUNIONECTOMY [place]  5/14/2012    Procedure:BUNIONECTOMY KATIE; RIGHT FOOT EULA KATIE BUNIONECTOMY, HAMMER TOE RECONSTRUCTION DIGITS TWO, THREE, FOUR AND FIVE; Surgeon:DEANDRE MONTE; Location: [facility]    C DEXA INTERPRETATION, AXIAL  8/2003    T score femur -1.3, lumbar -1.0    C DEXA INTERPRETATION, AXIAL  8/2004    T score lumbar -1.2, femoral -1.4    C DEXA INTERPRETATION, AXIAL  11/2007    T score lumbar -1.5 (decline -2.8%/yr), femoral neck -1.3/-1.8 (decline -1.8%/yr)    COLONOSCOPY  1993    COLONOSCOPY  9/2000    normal, repeat 10 years    COLONOSCOPY  10/2010    normal, repeat 5 years    DEXA  10/2010    T score lumbar -1.2(stable), femoral neck -1.8/-2.2 with BMD 0.755 (6% worsening)    HC TOOTH EXTRACTION W/FORCEP  1973    wisdom teeth extracted    REPAIR  HAMMER TOE  2012    Procedure:REPAIR HAMMER TOE; Surgeon:DEANDRE MONTE; Location:[facility]    STRESS ECHO  10/2010    normal LV function at rest, hyperdynamic with exercise, EF 55-60% at rest and 70% post exercise    STRESS ECHO  10/2010    negative for echo/EKG changes of ischemia, normal LV fct with EF 55-60%, systolic fct hyperdynamic with exercise, exercised 7 min, EK.5 mm upsloping ST depression inf lead    STRESS ECHO (METRO)  2004    negative, exercise 8-9 min    Z DEXA INTERPRETATION, AXIAL  2014    T score L1-4 of -1.4 (dec 2.7%), femoral neck -1.9/-2.2 with BMD 0.728 (dec 0.8%)    Family History   Problem Relation Age of Onset    C.A.D. Father         MI age 61,   age 76 from MI    Diabetes Father     Cancer Father         esophagus, former smoker    Obesity Father     Coronary Artery Disease Father             Other Cancer Father     C.A.D. Mother         MI age 62    Lipids Mother     Blood Disease Mother         pernicious anemia    Thyroid Disease Mother     Eye Disorder Mother         macular degeneration    Osteoporosis Mother     Connective Tissue Disorder Mother         SLE - remission    Hypertension Mother     Hyperlipidemia Mother     Asthma Mother     Dementia Mother         memory loss, onset early 90s    Coronary Artery Disease Mother             C.A.D. Maternal Grandmother          early 80s    C.A.D. Maternal Grandfather          at younger age    C.A.D. Paternal Grandmother          late 70s    Hypertension Brother     Prostate Cancer Brother     Colon Polyps Brother         sister may also have colon polyps    Coronary Artery Disease Brother         angioplasty    Asthma Brother     Obesity Brother         No longer obese    Arrhythmia Brother     Hyperlipidemia Sister     Osteoporosis Sister         she has osteopenia    Colon Polyps Sister         possible    Social History     Socioeconomic History    Marital status:      Spouse  name: Armond    Number of children: 0    Years of education: 16    Highest education level: Not on file   Occupational History    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 sleep disturbances    Stress Concern Yes     Comment: moderate    Weight Concern No    Special Diet Yes     Comment: ( diabetic)- low carb//soy milk    Back Care No    Exercise Yes     Comment: walking/aerobic 60 min 2 days per week, yoga one hour per week    Bike Helmet No    Seat Belt Yes    Self-Exams No    Parent/sibling w/ CABG, MI or angioplasty before 65F 55M? Yes     Comment: Mother   Social History Narrative     Lives with  who is diabetic.     Social Determinants of Health     Financial Resource Strain: Low Risk  (12/11/2023)    Financial Resource Strain     Within the past 12 months, have you or your family members you live with been unable to get utilities (heat, electricity) when it was really needed?: No   Food Insecurity: Low Risk  (12/11/2023)    Food Insecurity     Within the past 12 months, did you worry that your food would run out before you got money to buy more?: No     Within the past 12 months, did the food you bought just not last and you didn t have money to get more?: No   Transportation Needs: Low Risk  (12/11/2023)    Transportation Needs     Within the past 12 months, has lack of transportation kept you from medical appointments, getting your medicines, non-medical  meetings or appointments, work, or from getting things that you need?: No   Physical Activity: Not on file   Stress: Not on file   Social Connections: Not on file   Interpersonal Safety: Low Risk  (12/11/2023)    Interpersonal Safety     Do you feel physically and emotionally safe where you currently live?: Yes     Within the past 12 months, have you been hit, slapped, kicked or otherwise physically hurt by someone?: No     Within the past 12 months, have you been humiliated or emotionally abused in other ways by your partner or ex-partner?: No   Housing Stability: Low Risk  (12/11/2023)    Housing Stability     Do you have housing? : Yes     Are you worried about losing your housing?: No          Medications  Allergies   Scheduled Meds:  Current Outpatient Medications   Medication Sig Dispense Refill    albuterol (PROAIR HFA/PROVENTIL HFA/VENTOLIN HFA) 108 (90 Base) MCG/ACT inhaler INHALE 2 PUFFS INTO THE LUNGS EVERY 6 HOURS AS NEEDED FOR SHORTNESS OF BREATH, WHEEZING OR COUGH (Patient not taking: Reported on 3/21/2024) 6.7 g 1    alendronate (FOSAMAX) 70 MG tablet Take 1 tablet (70 mg) by mouth every 7 days 12 tablet 1    atorvastatin (LIPITOR) 20 MG tablet TAKE 1 TABLET BY MOUTH EVERY DAY 90 tablet 0    carboxymethylcellulose PF (REFRESH PLUS) 0.5 % SOLN ophthalmic solution Place 1-2 drops into both eyes At Bedtime      melatonin 1 MG TABS Take 2 mg by mouth At Bedtime       Multiple Vitamins-Minerals (PRESERVISION AREDS 2) CHEW Take by mouth 2 times daily      omeprazole (PRILOSEC) 20 MG DR capsule Take 1 capsule (20 mg) by mouth 2 times daily 180 capsule 2    polyethylene glycol 0.4%- propylene glycol 0.3% (SYSTANE ULTRA) 0.4-0.3 % SOLN ophthalmic solution Place 1-2 drops into both eyes daily as needed for dry eyes      Probiotic Product (PROBIOTIC-10) CAPS Take 1 capsule by mouth daily      Psyllium (METAMUCIL) 0.36 g CAPS Metamucil powder daily      tiZANidine (ZANAFLEX) 2 MG tablet Take 1 tablet at bedtime  as needed 30 tablet 0    No Known Allergies      Lab Results    Chemistry/lipid CBC Cardiac Enzymes/BNP/TSH/INR   Lab Results   Component Value Date    CHOL 174 03/05/2024    HDL 66 03/05/2024    TRIG 175 (H) 03/05/2024    BUN 14.2 03/05/2024     03/05/2024    CO2 27 03/05/2024    Lab Results   Component Value Date    WBC 7.7 03/05/2024    HGB 12.8 03/05/2024    HCT 38.1 03/05/2024    MCV 91 03/05/2024     03/05/2024    @RESUFAST(BMP,CBC,BNP,TSH,  INR)@      45 minutes spent reviewing prior records (including documentation, laboratory studies, cardiac testing/imaging), history and physical exam, planning, and subsequent documentation.       This note has been dictated using voice recognition software. Any grammatical, typographical, or context distortions are unintentional and inherent to the software.    Kayla Ramos PA-C, RD  General Cardiology           Thank you for allowing me to participate in the care of your patient.      Sincerely,     Kayla Ramos PA-C     [facility]  cc:   Massimo Bragg MD  [address]  [place] [id number]

## 2024-06-18 NOTE — PATIENT INSTRUCTIONS
It was great to see you today! Your Cardiology Team includes myself and Dr. Gross.     Monitor for exertional dizziness, marked shortness of breath or chest pain. Call if any significant dizziness or any loss of consciousness.     For scheduling questions, our 24 hours scheduling line is available at [phone].    To reach our nursing team, please call [phone]. Our nursing team is available 8-4:30 Monday-Friday. If you have a medical emergency, please call [phone].

## 2024-06-18 NOTE — PROGRESS NOTES
"        HEART CARE FOLLOW UP    Primary Care: Lori Escamilla MD  Primary Cardiologist: Dr Bragg      Assessment/Recommendations     LVOT obstruction, dynamic, gradient 18  Asymmetric septal hypertrophy with basal septal wall measuring 1.5 cm  Systolic anterior motion of mitral valve  She has excellent functional capacity without chest pain, dyspnea, dizziness. No history of syncope. No family history of sudden cardiac death. By definition she has hypertrophic cardiomyopathy. A diagnosis of HCM is made with LV wall thickness greater than or equal to 15 mm. Fortunately LVOT gradient is 18 mm at rest.   Holter monitor reassuring that there's no high burden of PVCs and no NSVT  Exercise echo to assess LVOT gradient   If chest pain in the future, can consider non-dihydropyridine calcium channel blocker  Follow up in 1 year with Dr. Bragg   Elevated blood pressure reading, an outlier. Will monitor.       Return to care in 1 year with Dr Bragg.      History of Present Illness/Subjective    Dorie Gagnon is a 74 year old female with past medical history significant for:  Systolic anterior motion of mitral valve  LV outflow obstruction, peak gradient 18 mmHg  Possible Hypertrophic Cardiomyopathy  Aortic regurgitation, mild  PVCs  RBBB  Moderate LVH  Hyperlipidemia     The patient was last seen in in clinic in March by Dr Bragg.  At that time she was mostly sedentary due to an ankle injury but denies shortness of breath, dyspnea, palpitations or syncope.  Cardiac MRI was concerning for hypertrophic cardiomyopathy.  An event monitor was ordered to evaluate for ventricular arrhythmia.  Addressed does not show any ventricular arrhythmia.    Today the patient tells me she's doing well, no concerns. Her ankle has recovered and she's trying to do more walking and work around the house. She goes up and down the stairs multiple times per day. She can walk a mile around a park nearby and ascends the stairs \"all day\", more " than 15 times per day. With her current level of workload she doesn't feel limitations, has slowed down due to her ankle. No chest pain or pressure. Denies dyspnea or SOB. No dizziness, lightheadedness, pre-syncope, syncope or falls. No palpitations or racing heart but does feel her PVCs. No leg swelling. Sleeping well, no orthopnea or PND, no blood in stool or urine.        Data Review Today:     MRI 3/22/204:  CONCLUSIONS:   1. Normal to hyperdynamic LV systolic function with LVEF of 77%.  2. Late gadolinium enhancement imaging shows no MI, fibrosis or infiltrative disease  3. There is asymmetric septal hypertrophy with basal septal wall measuring 1.5 cm.  Mitral valve systolic  anterior motion noted. Dephasing noted in LVOT on cine images indicating dynamic LVOT obstruction.  Together these findings may represent hypertrophic cardiomyopathy, can consider genetic testing as  clinically appropriate    TTE 6/7/2022:  1. Normal biventricular size and function. Left ventricular ejection fraction  of 60-65%.  2. No segmental wall motion abnormalities noted.  3. No hemodynamically significant valvular disease.  No prior study for comparison. Technically adequate study.  ________________________________________________      I have reviewed and updated the patient's past medical history, allergy list and medication list.          Physical Examination   Vitals: There were no vitals taken for this visit.    BMI= There is no height or weight on file to calculate BMI.    Wt Readings from Last 3 Encounters:   04/08/24 59.4 kg (131 lb)   03/21/24 59.8 kg (131 lb 12.8 oz)   03/14/24 57.6 kg (127 lb)       General :   Alert and oriented, in no acute distress.    HEENT:  Normocephalic and atraumatic. .    Neck: No JVP, carotid bruit or obvious thyromegaly.   Lungs:   Respirations unlabored. Clear bilaterally with no rales, rhonchi, or wheezes.     Cardiovascular:   Rhythm is regular. S1 and S2 are normal. No significant murmur is  present. Lower extremities demonstrate no significant edema.        Skin: Skin is warm, dry, and otherwise intact.   Neurologic: Gait not assessed. Mood and affect appropriate.           Medical History  Surgical History Family History Social History   Past Medical History:   Diagnosis Date    Allergy to cats     nasal symptoms    Arthritis 2015    x-ray    Family history of ischemic heart disease     parents and brother with CAD onset age 60s    Hidradenitis     recurrent, groin    Influenza with other manifestations 1972    hospitalized    Menopause 1993    age 43    Mixed hyperlipidemia     Osteopenia 2003 2014 FRAX: 12% risk major osteoporotic fx, 2.2% risk hip fx    Status post bunionectomy 05/2012    right with hammertoe repairs    Past Surgical History:   Procedure Laterality Date    BUNIONECTOMY Left 4/24/2019    Procedure: LEFT HALLIX VAGUS AND SECOND CLAWTOE RECONSTRUCTION;  Surgeon: Tyler Jameson MD;  Location:  OR    BUNIONECTOMY KATIE  5/14/2012    Procedure:BUNIONECTOMY KATIE; RIGHT FOOT EULA KATIE BUNIONECTOMY, HAMMER TOE RECONSTRUCTION DIGITS TWO, THREE, FOUR AND FIVE; Surgeon:DEANDRE MONTE; Location: [facility]    C DEXA INTERPRETATION, AXIAL  8/2003    T score femur -1.3, lumbar -1.0    C DEXA INTERPRETATION, AXIAL  8/2004    T score lumbar -1.2, femoral -1.4    C DEXA INTERPRETATION, AXIAL  11/2007    T score lumbar -1.5 (decline -2.8%/yr), femoral neck -1.3/-1.8 (decline -1.8%/yr)    COLONOSCOPY  1993    COLONOSCOPY  9/2000    normal, repeat 10 years    COLONOSCOPY  10/2010    normal, repeat 5 years    DEXA  10/2010    T score lumbar -1.2(stable), femoral neck -1.8/-2.2 with BMD 0.755 (6% worsening)    HC TOOTH EXTRACTION W/FORCEP  1973    wisdom teeth extracted    REPAIR HAMMER TOE  5/14/2012    Procedure:REPAIR HAMMER TOE; Surgeon:DEANDRE MONTE; Location: [facility]    STRESS ECHO  10/2010    normal LV function at rest, hyperdynamic with exercise, EF 55-60% at rest and 70% post  exercise    STRESS ECHO  10/2010    negative for echo/EKG changes of ischemia, normal LV fct with EF 55-60%, systolic fct hyperdynamic with exercise, exercised 7 min, EK.5 mm upsloping ST depression inf lead    STRESS ECHO ([facility])  2004    negative, exercise 8-9 min    Z DEXA INTERPRETATION, AXIAL  2014    T score L1-4 of -1.4 (dec 2.7%), femoral neck -1.9/-2.2 with BMD 0.728 (dec 0.8%)    Family History   Problem Relation Age of Onset    C.A.D. Father         MI age 61,   age 76 from MI    Diabetes Father     Cancer Father         esophagus, former smoker    Obesity Father     Coronary Artery Disease Father             Other Cancer Father     C.A.D. Mother         MI age 62    Lipids Mother     Blood Disease Mother         pernicious anemia    Thyroid Disease Mother     Eye Disorder Mother         macular degeneration    Osteoporosis Mother     Connective Tissue Disorder Mother         SLE - remission    Hypertension Mother     Hyperlipidemia Mother     Asthma Mother     Dementia Mother         memory loss, onset early 90s    Coronary Artery Disease Mother             C.A.D. Maternal Grandmother          early 80s    C.A.D. Maternal Grandfather          at younger age    C.A.D. Paternal Grandmother          late 70s    Hypertension Brother     Prostate Cancer Brother     Colon Polyps Brother         sister may also have colon polyps    Coronary Artery Disease Brother         angioplasty    Asthma Brother     Obesity Brother         No longer obese    Arrhythmia Brother     Hyperlipidemia Sister     Osteoporosis Sister         she has osteopenia    Colon Polyps Sister         possible    Social History     Socioeconomic History    Marital status:      Spouse name: Armond    Number of children: 0    Years of education: 16    Highest education level: Not on file   Occupational History    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 sleep disturbances    Stress Concern Yes     Comment: moderate    Weight Concern No    Special Diet Yes     Comment: ( diabetic)- low carb//soy milk    Back Care No    Exercise Yes     Comment: walking/aerobic 60 min 2 days per week, yoga one hour per week    Bike Helmet No    Seat Belt Yes    Self-Exams No    Parent/sibling w/ CABG, MI or angioplasty before 65F 55M? Yes     Comment: Mother   Social History Narrative     Lives with  who is diabetic.     Social Determinants of Health     Financial Resource Strain: Low Risk  (12/11/2023)    Financial Resource Strain     Within the past 12 months, have you or your family members you live with been unable to get utilities (heat, electricity) when it was really needed?: No   Food Insecurity: Low Risk  (12/11/2023)    Food Insecurity     Within the past 12 months, did you worry that your food would run out before you got money to buy more?: No     Within the past 12 months, did the food you bought just not last and you didn t have money to get more?: No   Transportation Needs: Low Risk  (12/11/2023)    Transportation Needs     Within the past 12 months, has lack of transportation kept you from medical appointments, getting your medicines, non-medical meetings or appointments, work, or from getting things that you need?: No   Physical Activity: Not on file   Stress: Not on file   Social Connections: Not on file   Interpersonal Safety: Low Risk  (12/11/2023)     Interpersonal Safety     Do you feel physically and emotionally safe where you currently live?: Yes     Within the past 12 months, have you been hit, slapped, kicked or otherwise physically hurt by someone?: No     Within the past 12 months, have you been humiliated or emotionally abused in other ways by your partner or ex-partner?: No   Housing Stability: Low Risk  (12/11/2023)    Housing Stability     Do you have housing? : Yes     Are you worried about losing your housing?: No          Medications  Allergies   Scheduled Meds:  Current Outpatient Medications   Medication Sig Dispense Refill    albuterol (PROAIR HFA/PROVENTIL HFA/VENTOLIN HFA) 108 (90 Base) MCG/ACT inhaler INHALE 2 PUFFS INTO THE LUNGS EVERY 6 HOURS AS NEEDED FOR SHORTNESS OF BREATH, WHEEZING OR COUGH (Patient not taking: Reported on 3/21/2024) 6.7 g 1    alendronate (FOSAMAX) 70 MG tablet Take 1 tablet (70 mg) by mouth every 7 days 12 tablet 1    atorvastatin (LIPITOR) 20 MG tablet TAKE 1 TABLET BY MOUTH EVERY DAY 90 tablet 0    carboxymethylcellulose PF (REFRESH PLUS) 0.5 % SOLN ophthalmic solution Place 1-2 drops into both eyes At Bedtime      melatonin 1 MG TABS Take 2 mg by mouth At Bedtime       Multiple Vitamins-Minerals (PRESERVISION AREDS 2) CHEW Take by mouth 2 times daily      omeprazole (PRILOSEC) 20 MG DR capsule Take 1 capsule (20 mg) by mouth 2 times daily 180 capsule 2    polyethylene glycol 0.4%- propylene glycol 0.3% (SYSTANE ULTRA) 0.4-0.3 % SOLN ophthalmic solution Place 1-2 drops into both eyes daily as needed for dry eyes      Probiotic Product (PROBIOTIC-10) CAPS Take 1 capsule by mouth daily      Psyllium (METAMUCIL) 0.36 g CAPS Metamucil powder daily      tiZANidine (ZANAFLEX) 2 MG tablet Take 1 tablet at bedtime as needed 30 tablet 0    No Known Allergies      Lab Results    Chemistry/lipid CBC Cardiac Enzymes/BNP/TSH/INR   Lab Results   Component Value Date    CHOL 174 03/05/2024    HDL 66 03/05/2024    TRIG 175 (H)  03/05/2024    BUN 14.2 03/05/2024     03/05/2024    CO2 27 03/05/2024    Lab Results   Component Value Date    WBC 7.7 03/05/2024    HGB 12.8 03/05/2024    HCT 38.1 03/05/2024    MCV 91 03/05/2024     03/05/2024    @RESUFAST(BMP,CBC,BNP,TSH,  INR)@      45 minutes spent reviewing prior records (including documentation, laboratory studies, cardiac testing/imaging), history and physical exam, planning, and subsequent documentation.       This note has been dictated using voice recognition software. Any grammatical, typographical, or context distortions are unintentional and inherent to the software.    Kayla Ramos PA-C, RD  General Cardiology

## 2024-06-25 ENCOUNTER — OFFICE VISIT (OUTPATIENT)
Dept: FAMILY MEDICINE | Facility: CLINIC | Age: 74
End: 2024-06-25
Attending: INTERNAL MEDICINE
Payer: COMMERCIAL

## 2024-06-25 VITALS
OXYGEN SATURATION: 99 % | DIASTOLIC BLOOD PRESSURE: 80 MMHG | WEIGHT: 134 LBS | SYSTOLIC BLOOD PRESSURE: 178 MMHG | HEART RATE: 72 BPM | BODY MASS INDEX: 25.3 KG/M2 | HEIGHT: 61 IN | TEMPERATURE: 97 F | RESPIRATION RATE: 16 BRPM

## 2024-06-25 DIAGNOSIS — M81.0 AGE RELATED OSTEOPOROSIS, UNSPECIFIED PATHOLOGICAL FRACTURE PRESENCE: ICD-10-CM

## 2024-06-25 DIAGNOSIS — R10.819 SUPRAPUBIC TENDERNESS: ICD-10-CM

## 2024-06-25 DIAGNOSIS — I49.3 PVC'S (PREMATURE VENTRICULAR CONTRACTIONS): ICD-10-CM

## 2024-06-25 DIAGNOSIS — I10 ESSENTIAL HYPERTENSION: Primary | ICD-10-CM

## 2024-06-25 DIAGNOSIS — R93.1 ABNORMAL ECHOCARDIOGRAM: ICD-10-CM

## 2024-06-25 PROBLEM — M79.672 LEFT FOOT PAIN: Status: ACTIVE | Noted: 2024-04-09

## 2024-06-25 LAB
ALBUMIN UR-MCNC: NEGATIVE MG/DL
APPEARANCE UR: CLEAR
BACTERIA #/AREA URNS HPF: NORMAL /HPF
BILIRUB UR QL STRIP: NEGATIVE
COLOR UR AUTO: YELLOW
GLUCOSE UR STRIP-MCNC: NEGATIVE MG/DL
HGB UR QL STRIP: NEGATIVE
KETONES UR STRIP-MCNC: NEGATIVE MG/DL
LEUKOCYTE ESTERASE UR QL STRIP: NEGATIVE
NITRATE UR QL: NEGATIVE
PH UR STRIP: 5.5 [PH] (ref 5–7)
RBC #/AREA URNS AUTO: NORMAL /HPF
SP GR UR STRIP: 1.01 (ref 1–1.03)
SQUAMOUS #/AREA URNS AUTO: NORMAL /LPF
UROBILINOGEN UR STRIP-ACNC: 0.2 E.U./DL
WBC #/AREA URNS AUTO: NORMAL /HPF

## 2024-06-25 PROCEDURE — 81001 URINALYSIS AUTO W/SCOPE: CPT | Performed by: INTERNAL MEDICINE

## 2024-06-25 PROCEDURE — 99214 OFFICE O/P EST MOD 30 MIN: CPT | Performed by: INTERNAL MEDICINE

## 2024-06-25 PROCEDURE — 80048 BASIC METABOLIC PNL TOTAL CA: CPT | Performed by: INTERNAL MEDICINE

## 2024-06-25 PROCEDURE — 36415 COLL VENOUS BLD VENIPUNCTURE: CPT | Performed by: INTERNAL MEDICINE

## 2024-06-25 RX ORDER — ATENOLOL 25 MG/1
25 TABLET ORAL DAILY
Qty: 90 TABLET | Refills: 1 | Status: SHIPPED | OUTPATIENT
Start: 2024-06-25 | End: 2024-08-28

## 2024-06-25 RX ORDER — LISINOPRIL 5 MG/1
5 TABLET ORAL DAILY
Qty: 90 TABLET | Refills: 1 | Status: SHIPPED | OUTPATIENT
Start: 2024-06-25 | End: 2024-06-25

## 2024-06-25 ASSESSMENT — ASTHMA QUESTIONNAIRES
QUESTION_1 LAST FOUR WEEKS HOW MUCH OF THE TIME DID YOUR ASTHMA KEEP YOU FROM GETTING AS MUCH DONE AT WORK, SCHOOL OR AT HOME: NONE OF THE TIME
ACT_TOTALSCORE: 25
ACT_TOTALSCORE: 25
QUESTION_2 LAST FOUR WEEKS HOW OFTEN HAVE YOU HAD SHORTNESS OF BREATH: NOT AT ALL
QUESTION_3 LAST FOUR WEEKS HOW OFTEN DID YOUR ASTHMA SYMPTOMS (WHEEZING, COUGHING, SHORTNESS OF BREATH, CHEST TIGHTNESS OR PAIN) WAKE YOU UP AT NIGHT OR EARLIER THAN USUAL IN THE MORNING: NOT AT ALL
QUESTION_5 LAST FOUR WEEKS HOW WOULD YOU RATE YOUR ASTHMA CONTROL: COMPLETELY CONTROLLED
QUESTION_4 LAST FOUR WEEKS HOW OFTEN HAVE YOU USED YOUR RESCUE INHALER OR NEBULIZER MEDICATION (SUCH AS ALBUTEROL): NOT AT ALL

## 2024-06-25 ASSESSMENT — PAIN SCALES - GENERAL: PAINLEVEL: NO PAIN (0)

## 2024-06-25 NOTE — PROGRESS NOTES
Assessment and Plan  1. Essential hypertension  New problem added to patient on list, patient has been having persistently elevated blood pressures for which recommend her to get started on low-dose of atenolol which is proven to be better in HOCM patient's.  Follow-up instructions given to the patient on blood pressure home BP log to be sent in 10 days for further recommendations if no improvement.  - Basic metabolic panel  (Ca, Cl, CO2, Creat, Gluc, K, Na, BUN); Future  - Basic metabolic panel  (Ca, Cl, CO2, Creat, Gluc, K, Na, BUN)  - atenolol (TENORMIN) 25 MG tablet; Take 1 tablet (25 mg) by mouth daily  Dispense: 90 tablet; Refill: 1    2. Abnormal echocardiogram  3. PVC's (premature ventricular contractions)  Recent echocardiogram done for patient chest pain was showing possible septal hypertrophy and possibility of HOCM for which she was referred to cardiology at that time.  Reviewed the cardiology note, which does endorses start of beta-blockers or calcium channel blockers given her HOCM for any symptoms.  Given the above uncontrolled blood pressure will consider starting on atenolol low-dose.  -To follow-up with recommendations of cardiology on this.  - atenolol (TENORMIN) 25 MG tablet; Take 1 tablet (25 mg) by mouth daily  Dispense: 90 tablet; Refill: 1    4. Suprapubic tenderness  New problem, patient has been positive for suprapubic tenderness on palpation.  Will consider checking for UTI before further recommendations.  - UA with Microscopic reflex to Culture - lab collect; Future  - UA with Microscopic reflex to Culture - lab collect    5. Age related osteoporosis, unspecified pathological fracture presence  Ongoing problem, patient was recently started on Fosamax and will need recheck of renal function.  Emphasized on possible need of change to other medications options if there is any renal compromise.  Patient understood and agreed the plan.  - Basic metabolic panel  (Ca, Cl, CO2, Creat, Gluc, K,  "Na, BUN); Future  - Basic metabolic panel  (Ca, Cl, CO2, Creat, Gluc, K, Na, BUN)         Please note that this note consists of symbols derived from keyboarding, dictation and/or voice recognition software. As a result, there may be errors in the script that have gone undetected. Please consider this when interpreting information found in this chart.    Patient Instructions   As discussed I would recommend low-dose of blood pressure medication as Atenolol which is standard as per guidelines for HOCM patients   Please send me the home blood pressure log for next 10 days on this new start of medication.    Dietary Approaches to Stop Hypertension trial -- A different approach was evaluated in the Dietary Approaches to Stop Hypertension (DASH) trial [6]. Rather than evaluating sodium intake or weight loss, DASH randomly assigned 459 patients with BPs of less than 160/80 to 95 mmHg to one of three diets:  ?A control diet low in fruits, vegetables, and legumes and high in snacks, sweets, meats, and saturated fat.  ?A diet rich in fruits, vegetables, legumes and low in snacks and sweets.  ?A combination diet rich in fruits, vegetables, legumes, and low-fat dairy products and low in snacks, sweets, meats, and saturated and total fat (this combination diet is called the \"DASH diet\"). The DASH diet is comprised of four to five servings of fruit, four to five servings of vegetables, two to three servings of low-fat dairy per day, and <25 percent fat.  The following observations were noted in which the BP reductions were expressed in relation to the fall in BP seen with the control diet:  ?The fruits and vegetables diet reduced the BP by 2.8/1.1 mmHg, and the combination diet reduced the BP by 5.5/3.0.  ?These effects were more pronounced in patients with hypertension. With the combination diet, for example, the BP fell 11.4/5.5 mmHg in hypertensives versus 3.5/2.1 mmHg in the normotensives.  ?The antihypertensive effects were " maximal by the end of week 2 with any of the diets and were then maintained for eight weeks.    Return in about 2 months (around 8/25/2024), or if symptoms worsen or fail to improve, for Hypertension, Follow up of last visit, If symptoms persist.    Lori Escamilla MD  [facility] SHAMIKA Oshea is a 74 year old, presenting for the following health issues:  Recheck Medication and Lipids        6/25/2024     1:24 PM   Additional Questions   Roomed by Keke GUERRERO     History of Present Illness       Reason for visit:  Follow up requested by my doctor    She eats 2-3 servings of fruits and vegetables daily.She consumes 0 sweetened beverage(s) daily.She exercises with enough effort to increase her heart rate 20 to 29 minutes per day.  She exercises with enough effort to increase her heart rate 4 days per week.   She is taking medications regularly.       Hyperlipidemia Follow-Up    Are you regularly taking any medication or supplement to lower your cholesterol?   Yes-    Are you having muscle aches or other side effects that you think could be caused by your cholesterol lowering medication?  No      Last seen patient in March 2024 for possible his worsening on the echocardiogram done at that time she was given referral to cardiology at that time.    Recent cardiologist on June 18, 2024 for confirmation of LVOT obstruction, asymmetric septal hypertrophy with basal septal wall measuring 1 point centimeters, systolic anterior motion of mitral valve.  Last lab work in March 2024 showing normal renal function, borderline dyslipidemia, hypervitaminosis D, elevated B12 levels in 3000's       No Known Allergies     Past Medical History:   Diagnosis Date    Allergy to cats     nasal symptoms    Arthritis 2015    x-ray    Family history of ischemic heart disease     parents and brother with CAD onset age 60s    Hidradenitis     recurrent, groin    Influenza with other manifestations 1972     hospitalized    Menopause 1993    age 43    Mixed hyperlipidemia     Osteopenia 2003 FRAX: 12% risk major osteoporotic fx, 2.2% risk hip fx    Status post bunionectomy 2012    right with hammertoe repairs       Past Surgical History:   Procedure Laterality Date    BUNIONECTOMY Left 2019    Procedure: LEFT HALLIX VAGUS AND SECOND CLAWTOE RECONSTRUCTION;  Surgeon: Tyler Jameson MD;  Location:  OR    BUNIONECTOMY KATIE  2012    Procedure:BUNIONECTOMY KATIE; RIGHT FOOT EULA KATIE BUNIONECTOMY, HAMMER TOE RECONSTRUCTION DIGITS TWO, THREE, FOUR AND FIVE; Surgeon:DEANDRE MONTE; Location: [facility]    C DEXA INTERPRETATION, AXIAL  2003    T score femur -1.3, lumbar -1.0    C DEXA INTERPRETATION, AXIAL  2004    T score lumbar -1.2, femoral -1.4    C DEXA INTERPRETATION, AXIAL  2007    T score lumbar -1.5 (decline -2.8%/yr), femoral neck -1.3/-1.8 (decline -1.8%/yr)    COLONOSCOPY      COLONOSCOPY  2000    normal, repeat 10 years    COLONOSCOPY  10/2010    normal, repeat 5 years    DEXA  10/2010    T score lumbar -1.2(stable), femoral neck -1.8/-2.2 with BMD 0.755 (6% worsening)    HC TOOTH EXTRACTION W/FORCEP      wisdom teeth extracted    REPAIR HAMMER TOE  2012    Procedure:REPAIR HAMMER TOE; Surgeon:DEANDRE MONTE; Location: [facility]    STRESS ECHO  10/2010    normal LV function at rest, hyperdynamic with exercise, EF 55-60% at rest and 70% post exercise    STRESS ECHO  10/2010    negative for echo/EKG changes of ischemia, normal LV fct with EF 55-60%, systolic fct hyperdynamic with exercise, exercised 7 min, EK.5 mm upsloping ST depression inf lead    STRESS ECHO ([facility])  2004    negative, exercise 8-9 min    [facility] DEXA INTERPRETATION, AXIAL  2014    T score L1-4 of -1.4 (dec 2.7%), femoral neck -1.9/-2.2 with BMD 0.728 (dec 0.8%)       Family History   Problem Relation Age of Onset    C.A.D. Father         MI age 61,   age 76 from MI    Diabetes Father      Cancer Father         esophagus, former smoker    Obesity Father     Coronary Artery Disease Father             Other Cancer Father     C.A.D. Mother         MI age 62    Lipids Mother     Blood Disease Mother         pernicious anemia    Thyroid Disease Mother     Eye Disorder Mother         macular degeneration    Osteoporosis Mother     Connective Tissue Disorder Mother         SLE - remission    Hypertension Mother     Hyperlipidemia Mother     Asthma Mother     Dementia Mother         memory loss, onset early 90s    Coronary Artery Disease Mother             C.A.D. Maternal Grandmother          early 80s    C.A.D. Maternal Grandfather          at younger age    C.A.D. Paternal Grandmother          late 70s    Hypertension Brother     Prostate Cancer Brother     Colon Polyps Brother         sister may also have colon polyps    Coronary Artery Disease Brother         angioplasty    Asthma Brother     Obesity Brother         No longer obese    Arrhythmia Brother     Hyperlipidemia Sister     Osteoporosis Sister         she has osteopenia    Colon Polyps Sister         possible       Social History     Tobacco Use    Smoking status: Never    Smokeless tobacco: Never   Substance Use Topics    Alcohol use: Yes     Comment: Wine occasionally        Current Outpatient Medications   Medication Sig Dispense Refill    alendronate (FOSAMAX) 70 MG tablet Take 1 tablet (70 mg) by mouth every 7 days 12 tablet 1    atenolol (TENORMIN) 25 MG tablet Take 1 tablet (25 mg) by mouth daily 90 tablet 1    atorvastatin (LIPITOR) 20 MG tablet TAKE 1 TABLET BY MOUTH EVERY DAY 90 tablet 0    carboxymethylcellulose PF (REFRESH PLUS) 0.5 % SOLN ophthalmic solution Place 1-2 drops into both eyes At Bedtime      cholecalciferol (VITAMIN D3) 25 mcg (1000 units) capsule Take 1 capsule by mouth daily      melatonin 1 MG TABS Take 2 mg by mouth At Bedtime       Multiple Vitamins-Minerals (PRESERVISION AREDS 2) CHEW  "Take by mouth 2 times daily      omeprazole (PRILOSEC) 20 MG DR capsule Take 1 capsule (20 mg) by mouth 2 times daily 180 capsule 2    polyethylene glycol 0.4%- propylene glycol 0.3% (SYSTANE ULTRA) 0.4-0.3 % SOLN ophthalmic solution Place 1-2 drops into both eyes daily as needed for dry eyes      Probiotic Product (PROBIOTIC-10) CAPS Take 1 capsule by mouth daily      Psyllium (METAMUCIL) 0.36 g CAPS Metamucil powder daily      tiZANidine (ZANAFLEX) 2 MG tablet Take 1 tablet at bedtime as needed 30 tablet 0    albuterol (PROAIR HFA/PROVENTIL HFA/VENTOLIN HFA) 108 (90 Base) MCG/ACT inhaler INHALE 2 PUFFS INTO THE LUNGS EVERY 6 HOURS AS NEEDED FOR SHORTNESS OF BREATH, WHEEZING OR COUGH (Patient not taking: Reported on 3/21/2024) 6.7 g 1    nifedipine 0.2% in white petrolatum 0.2 % OINT ointment APPLY TOPICALLY TWICE DAILY AS DIRECTED       No current facility-administered medications for this visit.          Review of Systems  Constitutional, HEENT, cardiovascular, pulmonary, GI, , musculoskeletal, neuro, skin, endocrine and psych systems are negative, except as otherwise noted.      Objective    BP (!) 178/80 (BP Location: Left arm, Patient Position: Sitting, Cuff Size: Adult Regular)   Pulse 72   Temp 97  F (36.1  C) (Tympanic)   Resp 16   Ht 1.55 m (5' 1.02\")   Wt 60.8 kg (134 lb)   SpO2 99%   BMI 25.30 kg/m    Body mass index is 25.3 kg/m .  Physical Exam   GENERAL: alert and no distress  NECK: no adenopathy, no asymmetry, masses, or scars  RESP: lungs clear to auscultation - no rales, rhonchi or wheezes  CV: regular rate and rhythm, normal S1 S2, no S3 or S4, no murmur, click or rub, no peripheral edema  ABDOMEN: soft, nontender, no hepatosplenomegaly, no masses and bowel sounds normal  MS: no gross musculoskeletal defects noted, no edema        Signed Electronically by: Lori Escamilla MD    "

## 2024-06-25 NOTE — PATIENT INSTRUCTIONS
"As discussed I would recommend low-dose of blood pressure medication as Atenolol which is standard as per guidelines for HOCM patients   Please send me the home blood pressure log for next 10 days on this new start of medication.    Dietary Approaches to Stop Hypertension trial -- A different approach was evaluated in the Dietary Approaches to Stop Hypertension (DASH) trial [6]. Rather than evaluating sodium intake or weight loss, DASH randomly assigned 459 patients with BPs of less than 160/80 to 95 mmHg to one of three diets:  ?A control diet low in fruits, vegetables, and legumes and high in snacks, sweets, meats, and saturated fat.  ?A diet rich in fruits, vegetables, legumes and low in snacks and sweets.  ?A combination diet rich in fruits, vegetables, legumes, and low-fat dairy products and low in snacks, sweets, meats, and saturated and total fat (this combination diet is called the \"DASH diet\"). The DASH diet is comprised of four to five servings of fruit, four to five servings of vegetables, two to three servings of low-fat dairy per day, and <25 percent fat.  The following observations were noted in which the BP reductions were expressed in relation to the fall in BP seen with the control diet:  ?The fruits and vegetables diet reduced the BP by 2.8/1.1 mmHg, and the combination diet reduced the BP by 5.5/3.0.  ?These effects were more pronounced in patients with hypertension. With the combination diet, for example, the BP fell 11.4/5.5 mmHg in hypertensives versus 3.5/2.1 mmHg in the normotensives.  ?The antihypertensive effects were maximal by the end of week 2 with any of the diets and were then maintained for eight weeks.    "

## 2024-06-26 LAB
ANION GAP SERPL CALCULATED.3IONS-SCNC: 10 MMOL/L (ref 7–15)
BUN SERPL-MCNC: 20.4 MG/DL (ref 8–23)
CALCIUM SERPL-MCNC: 9.1 MG/DL (ref 8.8–10.2)
CHLORIDE SERPL-SCNC: 105 MMOL/L (ref 98–107)
CREAT SERPL-MCNC: 0.75 MG/DL (ref 0.51–0.95)
DEPRECATED HCO3 PLAS-SCNC: 24 MMOL/L (ref 22–29)
EGFRCR SERPLBLD CKD-EPI 2021: 82 ML/MIN/1.73M2
GLUCOSE SERPL-MCNC: 94 MG/DL (ref 70–99)
POTASSIUM SERPL-SCNC: 4.8 MMOL/L (ref 3.4–5.3)
SODIUM SERPL-SCNC: 139 MMOL/L (ref 135–145)

## 2024-07-12 DIAGNOSIS — E78.5 HYPERLIPIDEMIA LDL GOAL <130: ICD-10-CM

## 2024-07-12 RX ORDER — ATORVASTATIN CALCIUM 20 MG/1
20 TABLET, FILM COATED ORAL DAILY
Qty: 90 TABLET | Refills: 2 | Status: SHIPPED | OUTPATIENT
Start: 2024-07-12

## 2024-07-16 DIAGNOSIS — M81.0 AGE RELATED OSTEOPOROSIS, UNSPECIFIED PATHOLOGICAL FRACTURE PRESENCE: ICD-10-CM

## 2024-07-16 RX ORDER — ALENDRONATE SODIUM 70 MG/1
70 TABLET ORAL
Qty: 12 TABLET | Refills: 2 | Status: SHIPPED | OUTPATIENT
Start: 2024-07-16

## 2024-08-28 ENCOUNTER — OFFICE VISIT (OUTPATIENT)
Dept: FAMILY MEDICINE | Facility: CLINIC | Age: 74
End: 2024-08-28
Attending: INTERNAL MEDICINE
Payer: COMMERCIAL

## 2024-08-28 ENCOUNTER — ANCILLARY PROCEDURE (OUTPATIENT)
Dept: GENERAL RADIOLOGY | Facility: CLINIC | Age: 74
End: 2024-08-28
Attending: INTERNAL MEDICINE
Payer: COMMERCIAL

## 2024-08-28 VITALS
BODY MASS INDEX: 25.49 KG/M2 | RESPIRATION RATE: 15 BRPM | OXYGEN SATURATION: 98 % | DIASTOLIC BLOOD PRESSURE: 72 MMHG | TEMPERATURE: 98 F | WEIGHT: 135 LBS | SYSTOLIC BLOOD PRESSURE: 138 MMHG | HEART RATE: 60 BPM

## 2024-08-28 DIAGNOSIS — R20.2 PARESTHESIA OF LOWER EXTREMITY: ICD-10-CM

## 2024-08-28 DIAGNOSIS — M25.551 BILATERAL HIP PAIN: ICD-10-CM

## 2024-08-28 DIAGNOSIS — M25.552 BILATERAL HIP PAIN: ICD-10-CM

## 2024-08-28 DIAGNOSIS — K60.2 ANAL FISSURE: ICD-10-CM

## 2024-08-28 DIAGNOSIS — I10 ESSENTIAL HYPERTENSION: Primary | ICD-10-CM

## 2024-08-28 DIAGNOSIS — R93.1 ABNORMAL ECHOCARDIOGRAM: ICD-10-CM

## 2024-08-28 DIAGNOSIS — I49.3 PVC'S (PREMATURE VENTRICULAR CONTRACTIONS): ICD-10-CM

## 2024-08-28 DIAGNOSIS — M16.0 PRIMARY OSTEOARTHRITIS OF BOTH HIPS: ICD-10-CM

## 2024-08-28 DIAGNOSIS — K59.00 CONSTIPATION, UNSPECIFIED CONSTIPATION TYPE: ICD-10-CM

## 2024-08-28 DIAGNOSIS — M46.1 ARTHRITIS OF SACROILIAC JOINT (H): ICD-10-CM

## 2024-08-28 PROCEDURE — 72170 X-RAY EXAM OF PELVIS: CPT | Mod: TC | Performed by: RADIOLOGY

## 2024-08-28 PROCEDURE — 99214 OFFICE O/P EST MOD 30 MIN: CPT | Performed by: INTERNAL MEDICINE

## 2024-08-28 PROCEDURE — G2211 COMPLEX E/M VISIT ADD ON: HCPCS | Performed by: INTERNAL MEDICINE

## 2024-08-28 RX ORDER — ATENOLOL 25 MG/1
25 TABLET ORAL DAILY
Qty: 90 TABLET | Refills: 3 | Status: SHIPPED | OUTPATIENT
Start: 2024-08-28

## 2024-08-28 RX ORDER — ATENOLOL 25 MG/1
25 TABLET ORAL DAILY
Qty: 90 TABLET | Refills: 1 | Status: SHIPPED | OUTPATIENT
Start: 2024-08-28 | End: 2024-08-28

## 2024-08-28 RX ORDER — DOCUSATE SODIUM 100 MG/1
100 CAPSULE, LIQUID FILLED ORAL DAILY PRN
Qty: 100 CAPSULE | Refills: 12 | Status: SHIPPED | OUTPATIENT
Start: 2024-08-28

## 2024-08-28 ASSESSMENT — PAIN SCALES - GENERAL: PAINLEVEL: NO PAIN (0)

## 2024-08-28 NOTE — PATIENT INSTRUCTIONS
As discussed, will continue current Atenolol. Refills done    Will refill your anal fissure medication as well as consider constipation regimen for improvement of your symptoms.     Will check X ray of the pelvis and plan on physical therapy for improvement of your extremity symptoms.     =============================

## 2024-08-28 NOTE — PROGRESS NOTES
Assessment and Plan  1. Essential hypertension  2. Abnormal echocardiogram  3. PVC's (premature ventricular contractions)  Chronic problem, well-controlled with current atenolol.  Home BP log remaining within normal limits at this dose.  Will consider to continue the same.  Patient understood and agreed the plan.  - atenolol (TENORMIN) 25 MG tablet; Take 1 tablet (25 mg) by mouth daily.  Dispense: 90 tablet; Refill: 3    4. Anal fissure  Chronic problem, patient endorses that this worked well when she was given by previous provider we will go ahead and refill the same.  - nifedipine 0.2% in white petrolatum 0.2 % OINT ointment; Place rectally 2 times daily.  Dispense: 60 g; Refill: 1    5. Constipation, unspecified constipation type  Chronic problem, intermittent flareups.  Discussed on better control of constipation and improvement with lifestyle modifications explained.  Recommend Colace to continue as well as over-the-counter MiraLAX/Metamucil if she is using already.  - docusate sodium (COLACE) 100 MG capsule; Take 1 capsule (100 mg) by mouth daily as needed for constipation.  Dispense: 100 capsule; Refill: 12    6. Bilateral hip pain  Ongoing problem, uncontrolled.  As patient endorses which is causing her lower extremity paresthesias.  Patient has seen previously and is a provider for the same issue as well with home physical therapy which she tried but not helping much.  -Shared decision to get an x-ray of the pelvis along with hip joints which could be causing this, before placing referral to physical therapy.  - XR Pelvis 1/2 Views; Future    7. Paresthesia of lower extremity  Ongoing problem, patient endorses that she is having chills like feeling in bilateral lower extremities after sitting for a while most likely numbness as she endorses.  Will consider checking the x-ray before recommending physical therapy on the diagnosis.  Patient understood and agreed the plan.  - XR Pelvis 1/2 Views; Future      UPDATE -   6. Bilateral hip pain  7. Paresthesia of lower extremity  8. Primary osteoarthritis of both hips  9. Arthritis of sacroiliac joint  New diagnosis on the X ray done for patient symptoms .   Positive for Degenerative changes on both Hips as well as Sacroiliac joints which is causing your symptoms.   - Recommend atleast 4 weeks of Physical therapy before further recommendations.  - Physical Therapy  Referral; Future      The longitudinal plan of care for the diagnosis(es)/condition(s) as documented were addressed during this visit. Due to the added complexity in care, I will continue to support Dorie in the subsequent management and with ongoing continuity of care.    Please note that this note consists of symbols derived from keyboarding, dictation and/or voice recognition software. As a result, there may be errors in the script that have gone undetected. Please consider this when interpreting information found in this chart.    Patient Instructions   As discussed, will continue current Atenolol. Refills done    Will refill your anal fissure medication as well as consider constipation regimen for improvement of your symptoms.     Will check X ray of the pelvis and plan on physical therapy for improvement of your extremity symptoms.     =============================          Return in about 4 weeks (around 9/25/2024), or if symptoms worsen or fail to improve, for If symptoms persist, Follow up of last visit, video visit.    Lori Escamilla MD  [facility]          Howard Oshea is a 74 year old, presenting for the following health issues:  Hypertension        8/28/2024     3:05 PM   Additional Questions   Roomed by Deysi     History of Present Illness       Hypertension: She presents for follow up of hypertension.  She does check blood pressure  regularly outside of the clinic. Outpatient blood pressures have not been over 140/90. She follows a low salt diet.      She eats 2-3 servings of fruits and vegetables daily.She consumes 0 sweetened beverage(s) daily.She exercises with enough effort to increase her heart rate 20 to 29 minutes per day.  She exercises with enough effort to increase her heart rate 6 days per week.   She is taking medications regularly.       Last seen patient in June 2024 for hypertension follow-up which was newly diagnosed and started on atenolol 20 mg daily at that time she did have abnormal echocardiogram and was following cardiology for HOCM possibly.  Patient is here for follow-up on the same.     No Known Allergies     Past Medical History:   Diagnosis Date    Allergy to cats     nasal symptoms    Arthritis 2015    x-ray    Family history of ischemic heart disease     parents and brother with CAD onset age 60s    Hidradenitis     recurrent, groin    Influenza with other manifestations 1972    hospitalized    Menopause 1993    age 43    Mixed hyperlipidemia     Osteopenia 2003 2014 FRAX: 12% risk major osteoporotic fx, 2.2% risk hip fx    Status post bunionectomy 05/2012    right with hammertoe repairs       Past Surgical History:   Procedure Laterality Date    BUNIONECTOMY Left 4/24/2019    Procedure: LEFT HALLIX VAGUS AND SECOND CLAWTOE RECONSTRUCTION;  Surgeon: Tyler Jameson MD;  Location:  OR    BUNIONECTOMY KATIE  5/14/2012    Procedure:BUNIONECTOMY KATIE; RIGHT FOOT EULA KATIE BUNIONECTOMY, HAMMER TOE RECONSTRUCTION DIGITS TWO, THREE, FOUR AND FIVE; Surgeon:DEANDRE MONTE; Location: [facility]    C DEXA INTERPRETATION, AXIAL  8/2003    T score femur -1.3, lumbar -1.0    C DEXA INTERPRETATION, AXIAL  8/2004    T score lumbar -1.2, femoral -1.4    C DEXA INTERPRETATION, AXIAL  11/2007    T score lumbar -1.5 (decline -2.8%/yr), femoral neck -1.3/-1.8 (decline -1.8%/yr)    COLONOSCOPY  1993    COLONOSCOPY  9/2000    normal, repeat 10 years    COLONOSCOPY  10/2010    normal, repeat 5 years    DEXA  10/2010    T score lumbar  -1.2(stable), femoral neck -1.8/-2.2 with BMD 0.755 (6% worsening)    HC TOOTH EXTRACTION W/FORCEP      wisdom teeth extracted    REPAIR HAMMER TOE  2012    Procedure:REPAIR HAMMER TOE; Surgeon:DEANDRE MONTE; Location:[facility]    STRESS ECHO  10/2010    normal LV function at rest, hyperdynamic with exercise, EF 55-60% at rest and 70% post exercise    STRESS ECHO  10/2010    negative for echo/EKG changes of ischemia, normal LV fct with EF 55-60%, systolic fct hyperdynamic with exercise, exercised 7 min, EK.5 mm upsloping ST depression inf lead    STRESS ECHO (METRO)  2004    negative, exercise 8-9 min    ZZC DEXA INTERPRETATION, AXIAL  2014    T score L1-4 of -1.4 (dec 2.7%), femoral neck -1.9/-2.2 with BMD 0.728 (dec 0.8%)       Family History   Problem Relation Age of Onset    C.A.D. Father         MI age 61,   age 76 from MI    Diabetes Father     Cancer Father         esophagus, former smoker    Obesity Father     Coronary Artery Disease Father             Other Cancer Father     C.A.D. Mother         MI age 62    Lipids Mother     Blood Disease Mother         pernicious anemia    Thyroid Disease Mother     Eye Disorder Mother         macular degeneration    Osteoporosis Mother     Connective Tissue Disorder Mother         SLE - remission    Hypertension Mother     Hyperlipidemia Mother     Asthma Mother     Dementia Mother         memory loss, onset early 90s    Coronary Artery Disease Mother             C.A.D. Maternal Grandmother          early 80s    C.A.D. Maternal Grandfather          at younger age    C.A.D. Paternal Grandmother          late 70s    Hypertension Brother     Prostate Cancer Brother     Colon Polyps Brother         sister may also have colon polyps    Coronary Artery Disease Brother         angioplasty    Asthma Brother     Obesity Brother         No longer obese    Arrhythmia Brother     Hyperlipidemia Sister     Osteoporosis Sister          she has osteopenia    Colon Polyps Sister         possible       Social History     Tobacco Use    Smoking status: Never    Smokeless tobacco: Never   Substance Use Topics    Alcohol use: Yes     Comment: Wine occasionally        Current Outpatient Medications   Medication Sig Dispense Refill    albuterol (PROAIR HFA/PROVENTIL HFA/VENTOLIN HFA) 108 (90 Base) MCG/ACT inhaler INHALE 2 PUFFS INTO THE LUNGS EVERY 6 HOURS AS NEEDED FOR SHORTNESS OF BREATH, WHEEZING OR COUGH 6.7 g 1    alendronate (FOSAMAX) 70 MG tablet TAKE 1 TABLET BY MOUTH EVERY 7 DAYS 12 tablet 2    atenolol (TENORMIN) 25 MG tablet Take 1 tablet (25 mg) by mouth daily. 90 tablet 3    atorvastatin (LIPITOR) 20 MG tablet TAKE 1 TABLET BY MOUTH EVERY DAY 90 tablet 2    carboxymethylcellulose PF (REFRESH PLUS) 0.5 % SOLN ophthalmic solution Place 1-2 drops into both eyes At Bedtime      docusate sodium (COLACE) 100 MG capsule Take 1 capsule (100 mg) by mouth daily as needed for constipation. 100 capsule 12    melatonin 1 MG TABS Take 2 mg by mouth At Bedtime       Multiple Vitamins-Minerals (PRESERVISION AREDS 2) CHEW Take by mouth 2 times daily      nifedipine 0.2% in white petrolatum 0.2 % OINT ointment Place rectally 2 times daily. 60 g 1    omeprazole (PRILOSEC) 20 MG DR capsule Take 1 capsule (20 mg) by mouth 2 times daily 180 capsule 2    polyethylene glycol 0.4%- propylene glycol 0.3% (SYSTANE ULTRA) 0.4-0.3 % SOLN ophthalmic solution Place 1-2 drops into both eyes daily as needed for dry eyes      Probiotic Product (PROBIOTIC-10) CAPS Take 1 capsule by mouth daily      Psyllium (METAMUCIL) 0.36 g CAPS Metamucil powder daily      tiZANidine (ZANAFLEX) 2 MG tablet Take 1 tablet at bedtime as needed 30 tablet 0    cholecalciferol (VITAMIN D3) 25 mcg (1000 units) capsule Take 1 capsule by mouth daily       No current facility-administered medications for this visit.          Review of Systems  Constitutional, HEENT, cardiovascular, pulmonary, GI, ,  musculoskeletal, neuro, skin, endocrine and psych systems are negative, except as otherwise noted.      Objective    /72   Pulse 60   Temp 98  F (36.7  C) (Tympanic)   Resp 15   Wt 61.2 kg (135 lb)   SpO2 98%   BMI 25.49 kg/m    Body mass index is 25.49 kg/m .  Physical Exam   GENERAL: alert and no distress  NECK: no adenopathy, no asymmetry, masses, or scars  RESP: lungs clear to auscultation - no rales, rhonchi or wheezes  CV: regular rate and rhythm, normal S1 S2, no S3 or S4, no murmur, click or rub, no peripheral edema  ABDOMEN: soft, nontender, no hepatosplenomegaly, no masses and bowel sounds normal  RECTAL : Deferred  MS: no gross musculoskeletal defects noted, no edema      Signed Electronically by: Lori Escamilla MD

## 2024-08-29 DIAGNOSIS — K60.2 ANAL FISSURE: ICD-10-CM

## 2024-09-05 DIAGNOSIS — K60.2 ANAL FISSURE: ICD-10-CM

## 2024-09-05 NOTE — TELEPHONE ENCOUNTER
Duplicate request for sending to [facility] pharmacy in the routing messages. It is pended in a newer encounter.     Bailey Lovett RN

## 2024-09-05 NOTE — TELEPHONE ENCOUNTER
Pt called for update. This is for pharmacy change. Pt stated she tried to transfer it and it did not work. RN let patient know that this was sent to Dr. Escamilla's in basket and it will be signed when she can get to it. Duplicate encounter.     Bailey Lovett, RN

## 2024-09-06 ENCOUNTER — MYC REFILL (OUTPATIENT)
Dept: FAMILY MEDICINE | Facility: CLINIC | Age: 74
End: 2024-09-06
Payer: COMMERCIAL

## 2024-09-06 DIAGNOSIS — M81.0 AGE RELATED OSTEOPOROSIS, UNSPECIFIED PATHOLOGICAL FRACTURE PRESENCE: ICD-10-CM

## 2024-09-06 RX ORDER — ALENDRONATE SODIUM 70 MG/1
70 TABLET ORAL
Qty: 12 TABLET | Refills: 2 | OUTPATIENT
Start: 2024-09-06

## 2024-09-19 ENCOUNTER — THERAPY VISIT (OUTPATIENT)
Dept: PHYSICAL THERAPY | Facility: CLINIC | Age: 74
End: 2024-09-19
Attending: INTERNAL MEDICINE
Payer: COMMERCIAL

## 2024-09-19 DIAGNOSIS — M25.551 BILATERAL HIP PAIN: ICD-10-CM

## 2024-09-19 DIAGNOSIS — M46.1 ARTHRITIS OF SACROILIAC JOINT (H): ICD-10-CM

## 2024-09-19 DIAGNOSIS — R20.2 PARESTHESIA OF LOWER EXTREMITY: ICD-10-CM

## 2024-09-19 DIAGNOSIS — M25.552 BILATERAL HIP PAIN: ICD-10-CM

## 2024-09-19 DIAGNOSIS — M16.0 PRIMARY OSTEOARTHRITIS OF BOTH HIPS: ICD-10-CM

## 2024-09-19 PROCEDURE — 97110 THERAPEUTIC EXERCISES: CPT | Mod: GP | Performed by: PHYSICAL THERAPIST

## 2024-09-19 PROCEDURE — 97161 PT EVAL LOW COMPLEX 20 MIN: CPT | Mod: GP | Performed by: PHYSICAL THERAPIST

## 2024-09-19 ASSESSMENT — ACTIVITIES OF DAILY LIVING (ADL)
ADL_SCORE(%): 0
GOING_UP_1_FLIGHT_OF_STAIRS: NO DIFFICULTY AT ALL
LOW_IMPACT_ACTIVITIES_LIKE_FAST_WALKING: SLIGHT DIFFICULTY
WALKING_FOR_APPROXIMATELY_10_MINUTES: NO DIFFICULTY AT ALL
WALKING_DOWN_STEEP_HILLS: NO DIFFICULTY AT ALL
PUTTING_ON_SOCKS_AND_SHOES: NO DIFFICULTY AT ALL
HOS_ADL_ITEM_SCORE_TOTAL: 50
ROLLING_OVER_IN_BED: NO DIFFICULTY AT ALL
PLEASE_INDICATE_YOR_PRIMARY_REASON_FOR_REFERRAL_TO_THERAPY:: HIP
GOING_DOWN_1_FLIGHT_OF_STAIRS: NO DIFFICULTY AT ALL
SITTING_FOR_15_MINUTES: NO DIFFICULTY AT ALL
LIGHT_TO_MODERATE_WORK: NO DIFFICULTY AT ALL
SPORTS_TOTAL_ITEM_SCORE: 0
HOS_ADL_HIGHEST_POTENTIAL_SCORE: 52
WALKING_UP_STEEP_HILLS: SLIGHT DIFFICULTY
ADL_TOTAL_ITEM_SCORE: 0
SPORTS_COUNT: 9
WALKING_15_MINUTES_OR_GREATER: NO DIFFICULTY AT ALL
HEAVY_WORK: SLIGHT DIFFICULTY
GETTING_INTO_AND_OUT_OF_AN_AVERAGE_CAR: NO DIFFICULTY AT ALL
STEPPING_UP_AND_DOWN_CURBS: NO DIFFICULTY AT ALL
SPORTS_HIGHEST_POTENTIAL_SCORE: 36
ADL_COUNT: 17
SPORTS_SCORE(%): 0
WALKING_INITIALLY: NO DIFFICULTY AT ALL
ADL_HIGHEST_POTENTIAL_SCORE: 68
HOW_WOULD_YOU_RATE_YOUR_CURRENT_LEVEL_OF_FUNCTION?: NEARLY NORMAL
STANDING_FOR_15_MINUTES: NO DIFFICULTY AT ALL

## 2024-09-19 NOTE — PROGRESS NOTES
PHYSICAL THERAPY EVALUATION  Type of Visit: Evaluation       Fall Risk Screen:  Fall screen completed by: PT  Have you fallen 2 or more times in the past year?: No  Have you fallen and had an injury in the past year?: Yes (broke ankle when catching foot on something when she got out of the car)  Is patient a fall risk?: No    Subjective       Presenting condition or subjective complaint: Hip pain that may be causing tingling and chills in lower body  Date of onset: 09/01/24 (date of MD referral)    Relevant medical history: Arthritis; Heart problems; High blood pressure; History of fractures; Osteoporosis   Dates & types of surgery: Bunion surgery in 2019 on left foot and in 2012 on right foot. Had surgery to repair broken ankle in December 2023 2023    Prior diagnostic imaging/testing results: X-ray; Bone scan     Prior therapy history for the same diagnosis, illness or injury: Yes Physical therapy in Feb 2023    Prior Level of Function  Transfers:   Ambulation:   ADL:   IADL:     Living Environment  Social support: With a significant other or spouse   Type of home: House; Multi-level   Stairs to enter the home: No       Ramp: No   Stairs inside the home: Yes 3 Is there a railing: Yes     Help at home: None  Equipment owned: Walker with wheels; Standard wheelchair; Bath bench     Employment: Not Applicable    Hobbies/Interests: Reading, walking, listening  to music, visiting  family in [place]    Patient goals for therapy: More ease of movement and less tingling in lower body especially while sitting    Pain assessment:      Objective   LUMBAR SPINE EVALUATION  PAIN:   INTEGUMENTARY (edema, incisions):   POSTURE:   GAIT:   Weightbearing Status:   Assistive Device(s):   Gait Deviations: WNL  BALANCE/PROPRIOCEPTION:   WEIGHTBEARING ALIGNMENT:   NON-WEIGHTBEARING ALIGNMENT:    ROM:   (Degrees) Left AROM Left PROM  Right AROM Right PROM   Hip Flexion  WNL  WNL (+ at groin)   Hip Extension       Hip Abduction        Hip Adduction       Hip Internal Rotation  WNL  WNL   Hip External Rotation  WNL  Min loss (+ at groin and lower abd)   Knee Flexion       Knee Extension       Lumbar Side glide WNL (+ at R lateral hip) WNL (+ at R lateral hip)   Lumbar Flexion WNL   Lumbar Extension WNL   Pain:   End feel:   PELVIC/SI SCREEN:     STRENGTH: B hip flex 5/5, B knee ext 5/5, B knee flex 5/5, R hip IR 5/5 (+ at R lateral hip), R hip ER 5/5, L hip IR 5/5, L hip ER 5/5, R hip abd 4-/5 (+), L hip abd 5/5    Work mechanical stresses:    Leisure mechanical stresses:   Functional disability score (CHRISTIANA/STarT Back):    VAS score (0-10): 1-2/10 B glut pain      ADDITIONAL HISTORY:  Present symptoms: B glut/lateral hip  Pain quality: Aching  Paresthesia (yes/no):  yes - B gluts/B lower legs - mostly when sitting  Symptoms (improving/unchanging/worsening):  worsening.   Symptoms commenced as a result of: insiduous   Condition occurred in the following environment:        Symptoms at onset (back/thigh/leg): pt had a hard time describing sx and when they started, but generally has had B glut pain and L-sided lateral hip pain  Constant symptoms (back/thigh/leg):   Intermittent symptoms (back/thigh/leg): B glut/R lateral hip pain with B anterior thigh to just below knee numbness    Symptoms are made worse with the following: being still - prolonged sitting, sleeping, morning   Symptoms are made better with the following: movement    Disturbed sleep (yes/no):  no Sleeping postures (prone/sup/side R/L): side R/L - mostly 5    Previous episodes (0/1-5/6-10/11+): many Year of first episode: has been going on for a long time - pt unable to give a specific year    Previous history: R hip pain and B low back pain  Previous treatments: PT    Specific Questions:  Cough/Sneeze/Strain (pos/neg): neg  Bowel/Bladder (normal/abnormal):   Gait (normal/abnormal): normal  Medications (nil/NSAIDS/analg/steroids/anticoag/other):    Current Outpatient Medications    Medication Sig Dispense Refill    albuterol (PROAIR HFA/PROVENTIL HFA/VENTOLIN HFA) 108 (90 Base) MCG/ACT inhaler INHALE 2 PUFFS INTO THE LUNGS EVERY 6 HOURS AS NEEDED FOR SHORTNESS OF BREATH, WHEEZING OR COUGH 6.7 g 1    alendronate (FOSAMAX) 70 MG tablet TAKE 1 TABLET BY MOUTH EVERY 7 DAYS 12 tablet 2    atenolol (TENORMIN) 25 MG tablet Take 1 tablet (25 mg) by mouth daily. 90 tablet 3    atorvastatin (LIPITOR) 20 MG tablet TAKE 1 TABLET BY MOUTH EVERY DAY 90 tablet 2    carboxymethylcellulose PF (REFRESH PLUS) 0.5 % SOLN ophthalmic solution Place 1-2 drops into both eyes At Bedtime      docusate sodium (COLACE) 100 MG capsule Take 1 capsule (100 mg) by mouth daily as needed for constipation. 100 capsule 12    melatonin 1 MG TABS Take 2 mg by mouth At Bedtime       Multiple Vitamins-Minerals (PRESERVISION AREDS 2) CHEW Take by mouth 2 times daily      nifedipine 0.2% in white petrolatum 0.2 % OINT ointment APPLY TOPICALLY TWICE DAILY AS DIRECTED 100 g 0    omeprazole (PRILOSEC) 20 MG DR capsule Take 1 capsule (20 mg) by mouth 2 times daily 180 capsule 2    polyethylene glycol 0.4%- propylene glycol 0.3% (SYSTANE ULTRA) 0.4-0.3 % SOLN ophthalmic solution Place 1-2 drops into both eyes daily as needed for dry eyes      Probiotic Product (PROBIOTIC-10) CAPS Take 1 capsule by mouth daily      Psyllium (METAMUCIL) 0.36 g CAPS Metamucil powder daily      tiZANidine (ZANAFLEX) 2 MG tablet Take 1 tablet at bedtime as needed 30 tablet 0     No current facility-administered medications for this visit.       Medical allergies:    General health (excellent/good/fair/poor):  good  Pertinent medical history:   Past Medical History:   Diagnosis Date    Allergy to cats     nasal symptoms    Arthritis 2015    x-ray    Family history of ischemic heart disease     parents and brother with CAD onset age 60s    Hidradenitis     recurrent, groin    Influenza with other manifestations 1972    hospitalized    Menopause 1993    age 43     Mixed hyperlipidemia     Osteopenia 2003 2014 FRAX: 12% risk major osteoporotic fx, 2.2% risk hip fx    Status post bunionectomy 05/2012    right with hammertoe repairs         Imaging (None/Xray/MRI/Other):  x-ray of pelvis  Recent or major surgery (yes/no):  no  Night pain (yes/no): no  Accidents (yes/no): no  Unexplained weight loss (yes/no): no  Barriers at home: no  Other red flags: no    Postural Observation:   Sitting:  slouched   Change of posture:   Standing: increased lumbar lordosis and increased thoracic kyphosis  Lateral Shift: Nil.  Other Observations:     Neurological:  Motor Deficit:  WNL   Reflexes:    Sensory Deficit:     Neurodynamic tests:      Test Movements: not done today  During: produces, abolishes, increases, decreases, no effect, centralizing, peripheralizing   After: better, worse, no better, no worse, no effect, centralized, peripheralized    Symptomatic response Mechanical response    During testing After testing Effect - increased ROM, decreased ROM, or key functional test No Effect   Pretest symptoms standing:      Rep FIS       Rep EIS         Pretest symptoms lying:     During testing After testing Effect - increased ROM, decreased ROM, or key functional test No Effect   Rep DELVIN       Rep EIL         If required, pretest symptoms:    During testing After testing Effect - increased ROM, decreased ROM, or key functional test No Effect   Rep SGIS - R       Rep SGIS - L         Static Tests:      Provisional Classification:     Potential Drivers of Pain and/or Disability:     MYOTOMES:   DTR S:   CORD SIGNS:   DERMATOMES:   NEURAL TENSION:   FLEXIBILITY:   LUMBAR/HIP Special Tests: (-) hip scouring, B   PELVIS/SI SPECIAL TESTS:   FUNCTIONAL TESTS:   PALPATION:   SPINAL SEGMENTAL CONCLUSIONS:       Assessment & Plan   CLINICAL IMPRESSIONS  Medical Diagnosis: Bilateral hip pain Paresthesia of lower extremity Primary osteoarthritis of both hips Arthritis of sacroiliac joint     Treatment Diagnosis: Bilateral hip pain   Impression/Assessment: Patient is a 74 year old female with R hip/B glut pain and numbness/tingling in B LE complaints.  The following significant findings have been identified: Pain, Decreased ROM/flexibility, Decreased strength, Impaired muscle performance, and Decreased activity tolerance. These impairments interfere with their ability to perform  prolonged sitting, sleeping  as compared to previous level of function.     Clinical Decision Making (Complexity):  Clinical Presentation: Stable/Uncomplicated  Clinical Presentation Rationale: based on medical and personal factors listed in PT evaluation  Clinical Decision Making (Complexity): Low complexity    PLAN OF CARE  Treatment Interventions:  Interventions: Neuromuscular Re-education, Therapeutic Activity, Therapeutic Exercise    Long Term Goals     PT Goal 1  Goal Identifier: sitting  Goal Description: Pt will be able to sit for 2+ hrs with 0-1/10 concordant B hip pain.  Rationale: to maximize safety and independence with transportation  Target Date: 11/14/24      Frequency of Treatment: 1x/week  Duration of Treatment: 8 weeks    Recommended Referrals to Other Professionals:   Education Assessment:   Learner/Method: No Barriers to Learning    Risks and benefits of evaluation/treatment have been explained.   Patient/Family/caregiver agrees with Plan of Care.     Evaluation Time:     PT Eval, Low Complexity Minutes (21587): 15       Signing Clinician: Sujatha James PT        [facility]                                                                                   OUTPATIENT PHYSICAL THERAPY      PLAN OF TREATMENT FOR OUTPATIENT REHABILITATION   Patient's Last Name, First Name, Dorie Patel YOB: 1950   Provider's Name   [facility]   Medical Record No.  [id number]     Onset Date: 09/01/24 (date of MD referral)  Start of Care  Date: 09/19/24     Medical Diagnosis:  Bilateral hip pain Paresthesia of lower extremity Primary osteoarthritis of both hips Arthritis of sacroiliac joint      PT Treatment Diagnosis:  Bilateral hip pain Plan of Treatment  Frequency/Duration: 1x/week/ 8 weeks    Certification date from 09/19/24 to 11/14/24         See note for plan of treatment details and functional goals     Sujatha James, PT                         I CERTIFY THE NEED FOR THESE SERVICES FURNISHED UNDER        THIS PLAN OF TREATMENT AND WHILE UNDER MY CARE     (Physician attestation of this document indicates review and certification of the therapy plan).              Referring Provider:  Lori Escamilla    Initial Assessment  See Epic Evaluation- Start of Care Date: 09/19/24

## 2024-09-26 ENCOUNTER — THERAPY VISIT (OUTPATIENT)
Dept: PHYSICAL THERAPY | Facility: CLINIC | Age: 74
End: 2024-09-26
Attending: INTERNAL MEDICINE
Payer: COMMERCIAL

## 2024-09-26 DIAGNOSIS — M25.551 BILATERAL HIP PAIN: Primary | ICD-10-CM

## 2024-09-26 DIAGNOSIS — R20.2 PARESTHESIA OF LOWER EXTREMITY: ICD-10-CM

## 2024-09-26 DIAGNOSIS — M46.1 ARTHRITIS OF SACROILIAC JOINT (H): ICD-10-CM

## 2024-09-26 DIAGNOSIS — M25.552 BILATERAL HIP PAIN: Primary | ICD-10-CM

## 2024-09-26 PROCEDURE — 97112 NEUROMUSCULAR REEDUCATION: CPT | Mod: GP | Performed by: PHYSICAL THERAPIST

## 2024-09-26 PROCEDURE — 97110 THERAPEUTIC EXERCISES: CPT | Mod: GP | Performed by: PHYSICAL THERAPIST

## 2024-10-01 ENCOUNTER — OFFICE VISIT (OUTPATIENT)
Dept: FAMILY MEDICINE | Facility: CLINIC | Age: 74
End: 2024-10-01
Payer: COMMERCIAL

## 2024-10-01 ENCOUNTER — NURSE TRIAGE (OUTPATIENT)
Dept: FAMILY MEDICINE | Facility: CLINIC | Age: 74
End: 2024-10-01

## 2024-10-01 VITALS
HEART RATE: 61 BPM | WEIGHT: 135.5 LBS | TEMPERATURE: 98 F | SYSTOLIC BLOOD PRESSURE: 153 MMHG | OXYGEN SATURATION: 100 % | HEIGHT: 61 IN | DIASTOLIC BLOOD PRESSURE: 83 MMHG | RESPIRATION RATE: 13 BRPM | BODY MASS INDEX: 25.58 KG/M2

## 2024-10-01 DIAGNOSIS — T63.441A BEE STING REACTION, ACCIDENTAL OR UNINTENTIONAL, INITIAL ENCOUNTER: Primary | ICD-10-CM

## 2024-10-01 DIAGNOSIS — I10 ESSENTIAL HYPERTENSION: ICD-10-CM

## 2024-10-01 PROCEDURE — 99213 OFFICE O/P EST LOW 20 MIN: CPT | Performed by: PHYSICIAN ASSISTANT

## 2024-10-01 RX ORDER — TRIAMCINOLONE ACETONIDE 1 MG/G
CREAM TOPICAL 2 TIMES DAILY
Qty: 45 G | Refills: 0 | Status: SHIPPED | OUTPATIENT
Start: 2024-10-01

## 2024-10-01 ASSESSMENT — PAIN SCALES - GENERAL: PAINLEVEL: NO PAIN (0)

## 2024-10-01 NOTE — PROGRESS NOTES
Howard Oshea is a 74 year old, presenting for the following health issues:  Insect Bites    History of Present Illness       Reason for visit:  Bee Sting  Symptom onset:  1-3 days ago  Symptoms include:  Red, and itchy  Symptom intensity:  Moderate  Symptom progression:  Staying the same  Had these symptoms before:  No  What makes it worse:  N/A  What makes it better:  Some kind of medication for itching   She is taking medications regularly.     Pt here with bee sting to the neck 3 days ago while sitting on her deck outside  The neck appears red and feels warm and firm  Denies any trouble talking, breathing or swallowing    Past Medical History:   Diagnosis Date    Allergy to cats     nasal symptoms    Arthritis 2015    x-ray    Family history of ischemic heart disease     parents and brother with CAD onset age 60s    Hidradenitis     recurrent, groin    Influenza with other manifestations 1972    hospitalized    Menopause 1993    age 43    Mixed hyperlipidemia     Osteopenia 2003 2014 FRAX: 12% risk major osteoporotic fx, 2.2% risk hip fx    Status post bunionectomy 05/2012    right with hammertoe repairs     Past Surgical History:   Procedure Laterality Date    BUNIONECTOMY Left 4/24/2019    Procedure: LEFT HALLIX VAGUS AND SECOND CLAWTOE RECONSTRUCTION;  Surgeon: Tyler Jameson MD;  Location:  OR    BUNIONECTOMY KATIE  5/14/2012    Procedure:BUNIONECTOMY KATIE; RIGHT FOOT EULA KATIE BUNIONECTOMY, HAMMER TOE RECONSTRUCTION DIGITS TWO, THREE, FOUR AND FIVE; Surgeon:DEANDRE MONTE; Location: [facility]    C DEXA INTERPRETATION, AXIAL  8/2003    T score femur -1.3, lumbar -1.0    C DEXA INTERPRETATION, AXIAL  8/2004    T score lumbar -1.2, femoral -1.4    C DEXA INTERPRETATION, AXIAL  11/2007    T score lumbar -1.5 (decline -2.8%/yr), femoral neck -1.3/-1.8 (decline -1.8%/yr)    COLONOSCOPY  1993    COLONOSCOPY  9/2000    normal, repeat 10 years    COLONOSCOPY  10/2010    normal, repeat 5 years     DEXA  10/2010    T score lumbar -1.2(stable), femoral neck -1.8/-2.2 with BMD 0.755 (6% worsening)    HC TOOTH EXTRACTION W/FORCEP  1973    wisdom teeth extracted    REPAIR HAMMER TOE  2012    Procedure:REPAIR HAMMER TOE; Surgeon:DEANDRE MONTE; Location:[facility]    STRESS ECHO  10/2010    normal LV function at rest, hyperdynamic with exercise, EF 55-60% at rest and 70% post exercise    STRESS ECHO  10/2010    negative for echo/EKG changes of ischemia, normal LV fct with EF 55-60%, systolic fct hyperdynamic with exercise, exercised 7 min, EK.5 mm upsloping ST depression inf lead    STRESS ECHO (METRO)  2004    negative, exercise 8-9 min    ZZC DEXA INTERPRETATION, AXIAL  2014    T score L1-4 of -1.4 (dec 2.7%), femoral neck -1.9/-2.2 with BMD 0.728 (dec 0.8%)     Social History     Tobacco Use    Smoking status: Never    Smokeless tobacco: Never   Substance Use Topics    Alcohol use: Yes     Comment: Wine occasionally     Current Outpatient Medications   Medication Sig Dispense Refill    albuterol (PROAIR HFA/PROVENTIL HFA/VENTOLIN HFA) 108 (90 Base) MCG/ACT inhaler INHALE 2 PUFFS INTO THE LUNGS EVERY 6 HOURS AS NEEDED FOR SHORTNESS OF BREATH, WHEEZING OR COUGH 6.7 g 1    alendronate (FOSAMAX) 70 MG tablet TAKE 1 TABLET BY MOUTH EVERY 7 DAYS 12 tablet 2    atenolol (TENORMIN) 25 MG tablet Take 1 tablet (25 mg) by mouth daily. 90 tablet 3    atorvastatin (LIPITOR) 20 MG tablet TAKE 1 TABLET BY MOUTH EVERY DAY 90 tablet 2    carboxymethylcellulose PF (REFRESH PLUS) 0.5 % SOLN ophthalmic solution Place 1-2 drops into both eyes At Bedtime      docusate sodium (COLACE) 100 MG capsule Take 1 capsule (100 mg) by mouth daily as needed for constipation. 100 capsule 12    melatonin 1 MG TABS Take 2 mg by mouth At Bedtime       Multiple Vitamins-Minerals (PRESERVISION AREDS 2) CHEW Take by mouth 2 times daily      nifedipine 0.2% in white petrolatum 0.2 % OINT ointment APPLY TOPICALLY TWICE DAILY AS DIRECTED  "100 g 0    omeprazole (PRILOSEC) 20 MG DR capsule Take 1 capsule (20 mg) by mouth 2 times daily 180 capsule 2    polyethylene glycol 0.4%- propylene glycol 0.3% (SYSTANE ULTRA) 0.4-0.3 % SOLN ophthalmic solution Place 1-2 drops into both eyes daily as needed for dry eyes      Probiotic Product (PROBIOTIC-10) CAPS Take 1 capsule by mouth daily      Psyllium (METAMUCIL) 0.36 g CAPS Metamucil powder daily      tiZANidine (ZANAFLEX) 2 MG tablet Take 1 tablet at bedtime as needed 30 tablet 0     No Known Allergies  FAMILY HISTORY NOTED AND REVIEWED    PHYSICAL EXAM:    BP (!) 153/83 (BP Location: Left arm, Patient Position: Sitting, Cuff Size: Adult Regular)   Pulse 61   Temp 98  F (36.7  C) (Tympanic)   Resp 13   Ht 1.549 m (5' 1\")   Wt 61.5 kg (135 lb 8 oz)   SpO2 100%   BMI 25.60 kg/m      Patient appears non toxic    R side of neck erythematous   Outlined the area of erythema so pt can watch for spread  No sign of retained stinger    Assessment and Plan:     (T63.441A) Bee sting reaction, accidental or unintentional, initial encounter  (primary encounter diagnosis)  Comment:   Plan: triamcinolone (KENALOG) 0.1 % external cream        Recd pt start zyrtec 10mg now, benadryl 50mg tonight, ice a few times and apply triamcin cream bid for the itch for up to 2 weeks.    (I10) Essential hypertension  Comment:   Plan: cont to monitor.      Carolin Pardo PA-C    "

## 2024-10-01 NOTE — TELEPHONE ENCOUNTER
"Pt agreed to Disposition, to be seen today, and assisted with scheduling the following:    Appointments in Next Year      Oct 01, 2024 4:30 PM  (Arrive by 4:10 PM)  Provider Visit with Carolin Pardo PA-C  [facility] ([facility] ) [phone]     Dot Wynn, RN    ~~~~~~~~~~~~~~~~~~  1. TYPE: \"What type of sting was it?\" (bee, yellow jacket, etc.)       Honey bee sting  2. ONSET: \"When did it occur?\"       Over the weekend (Saturday evening)  3. LOCATION: \"Where is the sting located?\"  \"How many stings?\"      Right side, lower  4. SWELLING SIZE: \"How big is the swelling?\" (inches or centimeters)      1-1.5\" with a ridge  5. REDNESS: \"Is the area red or pink?\" If so, ask \"What size is area of redness?\" (inches or cm). \"When did the redness start?\"      Yes, no streaking, cannot tell if a stinger is present  6. PAIN: \"Is there any pain?\" If so, ask: \"How bad is it?\"  (Scale 1-10; or mild, moderate, severe)      Denies  7. ITCHING: \"Is there any itching?\" If so, ask: \"How bad is it?\"       Yes, disruptive/annoying  8. RESPIRATORY DISTRESS: \"Describe your breathing.\"      Denies distress  9. PRIOR REACTIONS: \"Have you had any severe allergic reactions to stings in the past?\" if yes, ask: \"What happened?\"      No prior stings  10. OTHER SYMPTOMS: \"Do you have any other symptoms?\" (e.g., face or tongue swelling, new rash elsewhere, abdominal pain, vomiting)        Neck swollen - no reported SOB, respiratory distress    Additional Information   Negative: Passed out (i.e., fainted, collapsed and was not responding)   Negative: Wheezing or difficulty breathing   Negative: Hoarseness, cough, or tightness in the throat or chest   Negative: Swollen tongue or difficulty swallowing   Negative: Life-threatening reaction in past to sting (anaphylaxis) and < 2 hours since sting   Negative: Sounds like a life-threatening emergency to the triager   Negative: Not a bee, wasp, hornet, " or yellow jacket sting   Negative: Widespread hives, itching, or facial swelling and started within 2 hours of sting   Negative: Vomiting or abdominal cramps and started within 2 hours of sting   Negative: Gave epinephrine shot and no symptoms now   Negative: Patient sounds very sick or weak to the triager   Negative: Sting inside the mouth   Negative: Sting on eyeball (e.g., cornea)   Negative: More than 50 stings   Negative: Fever and area is red   Negative: Fever and area is very tender to touch   Negative: Red streak or red line and length > 2 inches (5 cm)   Negative: Red or very tender (to touch) area, and started over 24 hours after the sting   Negative: Red or very tender (to touch) area, getting larger over 48 hours after the sting   Negative: Swelling is huge (e.g., > 4 inches or 10 cm, spreads beyond wrist or ankle)   Patient wants to be seen    Protocols used: Bee Sting-A-OH

## 2024-10-03 ENCOUNTER — THERAPY VISIT (OUTPATIENT)
Dept: PHYSICAL THERAPY | Facility: CLINIC | Age: 74
End: 2024-10-03
Attending: INTERNAL MEDICINE
Payer: COMMERCIAL

## 2024-10-03 DIAGNOSIS — R20.2 PARESTHESIA OF LOWER EXTREMITY: ICD-10-CM

## 2024-10-03 DIAGNOSIS — M25.552 BILATERAL HIP PAIN: Primary | ICD-10-CM

## 2024-10-03 DIAGNOSIS — M25.551 BILATERAL HIP PAIN: Primary | ICD-10-CM

## 2024-10-03 DIAGNOSIS — M46.1 ARTHRITIS OF SACROILIAC JOINT (H): ICD-10-CM

## 2024-10-03 PROCEDURE — 97110 THERAPEUTIC EXERCISES: CPT | Mod: GP | Performed by: PHYSICAL THERAPIST

## 2024-10-03 PROCEDURE — 97112 NEUROMUSCULAR REEDUCATION: CPT | Mod: GP | Performed by: PHYSICAL THERAPIST

## 2024-10-24 ENCOUNTER — THERAPY VISIT (OUTPATIENT)
Dept: PHYSICAL THERAPY | Facility: CLINIC | Age: 74
End: 2024-10-24
Payer: COMMERCIAL

## 2024-10-24 DIAGNOSIS — R20.2 PARESTHESIA OF LOWER EXTREMITY: ICD-10-CM

## 2024-10-24 DIAGNOSIS — M46.1 ARTHRITIS OF SACROILIAC JOINT (H): ICD-10-CM

## 2024-10-24 DIAGNOSIS — M25.551 BILATERAL HIP PAIN: Primary | ICD-10-CM

## 2024-10-24 DIAGNOSIS — M25.552 BILATERAL HIP PAIN: Primary | ICD-10-CM

## 2024-10-24 PROCEDURE — 97110 THERAPEUTIC EXERCISES: CPT | Mod: GP | Performed by: PHYSICAL THERAPIST

## 2024-10-24 PROCEDURE — 97112 NEUROMUSCULAR REEDUCATION: CPT | Mod: GP | Performed by: PHYSICAL THERAPIST

## 2024-10-24 ASSESSMENT — ACTIVITIES OF DAILY LIVING (ADL)
WALKING_INITIALLY: NO DIFFICULTY AT ALL
GOING_DOWN_1_FLIGHT_OF_STAIRS: NO DIFFICULTY AT ALL
RECREATIONAL_ACTIVITIES: NO DIFFICULTY AT ALL
SITTING_FOR_15_MINUTES: NO DIFFICULTY AT ALL
STEPPING_UP_AND_DOWN_CURBS: NO DIFFICULTY AT ALL
TWISTING/PIVOTING_ON_INVOLVED_LEG: NO DIFFICULTY AT ALL
HOS_ADL_ITEM_SCORE_TOTAL: 62
STANDING_FOR_15_MINUTES: NO DIFFICULTY AT ALL
DEEP_SQUATTING: NO DIFFICULTY AT ALL
HEAVY_WORK: SLIGHT DIFFICULTY
WALKING_APPROXIMATELY_10_MINUTES: NO DIFFICULTY AT ALL
HOS_ADL_SCORE(%): 96.88
HOS_ADL_COUNT: 16
ROLLING_OVER_IN_BED: NO DIFFICULTY AT ALL
HOS_ADL_HIGHEST_POTENTIAL_SCORE: 64
GOING_UP_1_FLIGHT_OF_STAIRS: NO DIFFICULTY AT ALL
WALKING_15_MINUTES_OR_GREATER: NO DIFFICULTY AT ALL
GETTING_INTO_AND_OUT_OF_AN_AVERAGE_CAR: NO DIFFICULTY AT ALL
LIGHT_TO_MODERATE_WORK: NO DIFFICULTY AT ALL
WALKING_DOWN_STEEP_HILLS: NO DIFFICULTY AT ALL
PUTTING_ON_SOCKS_AND_SHOES: NO DIFFICULTY AT ALL
WALKING_UP_STEEP_HILLS: SLIGHT DIFFICULTY

## 2024-10-24 NOTE — PROGRESS NOTES
"    DISCHARGE  Reason for Discharge: Patient has met all goals.    Equipment Issued: none    Discharge Plan: Patient to continue home program.     10/24/24 0500   Appointment Info   Signing clinician's name / credentials Sujatha James DPT, Cert. MDT   Total/Authorized Visits 8 (E&T)   Visits Used 4   Medical Diagnosis Bilateral hip pain Paresthesia of lower extremity Primary osteoarthritis of both hips Arthritis of sacroiliac joint   PT Tx Diagnosis Bilateral hip pain   Progress Note/Certification   Start of Care Date 09/19/24   Onset of illness/injury or Date of Surgery 09/01/24  (date of MD referral)   Therapy Frequency 1x/week   Predicted Duration 8 weeks   Certification date from 09/19/24   Certification date to 11/14/24   Progress Note Completed Date 09/19/24   PT Goal 1   Goal Identifier sitting   Goal Description Pt will be able to sit for 2+ hrs with 0-1/10 concordant B hip pain.   Rationale to maximize safety and independence with transportation   Target Date 11/14/24   Date Met 10/24/24   Subjective Report   Subjective Report Pt reports that her hip pain is gone and she is feeling better with the exercises.  She still gets a \"funny feeling, like chills\" in both legs primarily in sitting.  She doesn't have any pain in her hips with sitting for a while in the evenings watching tv.  She also hasn't noticed pain with turning in bed.   She feels ready to work on her HEP independently at this point.   Objective Measures   Objective Measures Objective Measure 1;Objective Measure 2;Objective Measure 3   Objective Measure 1   Objective Measure Pain   Details no pain currently, no pain with exercises or after exercises   Objective Measure 2   Objective Measure R hip PROM   Details ER slight loss, all other directions WNL   Objective Measure 3   Objective Measure R hip strength   Details MMT abd 5/5   Treatment Interventions (PT)   Interventions Therapeutic Procedure/Exercise;Neuromuscular Re-education "   Therapeutic Procedure/Exercise   Therapeutic Procedures: strength, endurance, ROM, flexibility minutes (86737) 15   Therapeutic Procedures Ther Proc 2;Ther Proc 3;Ther Proc 4;Ther Proc 5   Ther Proc 1 standing hip flexor stretch   Ther Proc 1 - Details rev x3' total - max VC for correct form/technique and monitoring of sx during/after - stretch at anterior R hip and at lateral hip - HEP 2x/day   Ther Proc 2 supine hip ER stretch   Ther Proc 2 - Details rev - R x 10 reps - discussed how she should do this in supine to make easier - she had forgotten to do in supine and did in sitting, but realized that she does prefer doing this in sitting - mild increase at R lateral hip/NW - HEP 2x/day   Ther Proc 3 clamshells   Ther Proc 3 - Details rev x 10 reps w/GTB above knees - HEP   Ther Proc 4 reverse clamshells   Ther Proc 4 - Details HEP   Ther Proc 5 donkey kick   Ther Proc 5 - Details rev x20 reps, R LE; discussed doing this on both sides for HEP - VC/MC for form - HEP   Skilled Intervention VC/MC for form/technqiue   Patient Response/Progress see above   Neuromuscular Re-education   Neuromuscular re-ed of mvmt, balance, coord, kinesthetic sense, posture, proprioception minutes (26724) 15   Neuromuscular Re-education Neuro Re-ed 2;Neuro Re-ed 3;Neuro Re-ed 4   Neuro Re-ed 1 sitting w/lumbar roll   Neuro Re-ed 1 - Details rev - pt has been doing this for the past couple of days, but no change noticed yet in sx   Neuro Re-ed 2 bridge Ag   Neuro Re-ed 2 - Details progressed using GTB above knees x 10 reps - HEP   Neuro Re-ed 3 supine abd #8   Neuro Re-ed 3 - Details x10 reps - progressed to this today -HEP   Neuro Re-ed 4 side-stepping   Neuro Re-ed 4 - Details initiated - w/RTB at ankles x length of plinthe x 4 - VC for toes forward - HEP   Skilled Intervention VC for form/technique   Patient Response/Progress no pain   Education   Learner/Method No Barriers to Learning   Plan   Home program gave pt handout of HEP    Updates to plan of care d/c   Plan for next session d/c   Total Session Time   Timed Code Treatment Minutes 30   Total Treatment Time (sum of timed and untimed services) 30         Referring Provider:  Lori Escamilla

## 2024-11-06 ENCOUNTER — OFFICE VISIT (OUTPATIENT)
Dept: SURGERY | Facility: CLINIC | Age: 74
End: 2024-11-06
Payer: COMMERCIAL

## 2024-11-06 VITALS
DIASTOLIC BLOOD PRESSURE: 62 MMHG | HEART RATE: 65 BPM | HEIGHT: 61 IN | SYSTOLIC BLOOD PRESSURE: 122 MMHG | BODY MASS INDEX: 24.55 KG/M2 | WEIGHT: 130 LBS | OXYGEN SATURATION: 98 %

## 2024-11-06 DIAGNOSIS — K40.90 RIGHT INGUINAL HERNIA: Primary | ICD-10-CM

## 2024-11-06 PROCEDURE — 99212 OFFICE O/P EST SF 10 MIN: CPT | Performed by: STUDENT IN AN ORGANIZED HEALTH CARE EDUCATION/TRAINING PROGRAM

## 2024-11-06 NOTE — LETTER
"November 7, 2024          Lori Escamilla MD  [address] [place] [address]      RE:   Dorie Gagnon 1950      Dear Colleague,    Thank you for referring your patient, Dorie Gagnon, to [facility]. Please see a copy of my visit note below.     Dorie Gagnon is a 74 year old female who is presenting with her  for right inguinal hernia. I last saw her on 4/26/2023 for the same issue. She reports that since we last met, there hasn't been any major changes to her hernia. It overall as remained asymptomatic. She denies any pain, nausea, vomiting, skin changes,or obstipation. She denies any abdominal surgeries or significant changes to her health.        Vitals: /62   Pulse 65   Ht 1.549 m (5' 1\")   Wt 59 kg (130 lb)   SpO2 98%   BMI 24.56 kg/m    BMI= Body mass index is 24.56 kg/m .     EXAM:  General: Vital signs reviewed, in no apparent distress  Eyes: Anicteric  HENT: Normocephalic, atraumatic, trachea midline   Respiratory: Breathing nonlabored  Cardiovascular: Regular rate and rhythm  GI: Abdomen soft, nondistended, nontender. Right groin - palpable, small reducible bulge in her right groin, normal overlying skin, mild tenderness, soft. Left groin - No palpable hernia  Musculoskeletal: No gross deformities  Neurologic: Grossly nonfocal exam  Psychiatric: Normal mood, affect and insight  Integumentary: Warm and dry     All labs and imaging personally reviewed and significant for:      CT abd - reviewed imaging again from 4/20/2023 and significant for right inguinal hernia without bowel.     ASSESSMENT:  Dorie Gagnon is a 74 year old who presents with stable right inguinal hernia on exam and CT. We again rediscussed pathophysiology and the pertinent imaging were reviewed with the patient. We discussed watchful waiting versus surgical management.  We discussed the rate of incarceration for women is 3-4 times higher in " women than men. Due to this I recommend surgical repair and patient would like to proceed with surgical management.  I recommend we proceed with robotic inguinal hernia repair with permanent mesh.  I discussed with her that I will examine the other side for any evidence of hernia and repair the other side at the same time. Risk and benefits of the surgery include but is not limited to pain, bleeding, infection, seroma/hematoma formation, possible injury to surrounding structures and organs, and recurrence.  Perioperative course and postop management was also discussed with the patient.  Patient was given ample amount of time to ask questions and all questions were answered appropriately.  Patient is elected proceed with the stated procedure. Significant pertinent co-morbidities include:     PLAN:  Robotic Right Inguinal Hernia Repair, possible Left    Again, thank you for allowing me to participate in the care of your patient.      Sincerely,      Wyatt Washington MD

## 2024-11-06 NOTE — PROGRESS NOTES
"[facility] Consultation Follow Up    CHIEF COMPLAINT:  Chief Complaint   Patient presents with    RECHECK     return, right inguinal hernia, discuss scheduling surgery, bas       HISTORY OF PRESENT ILLNESS:  Dorie Gagnon is a 74 year old female who is presenting with her  for right inguinal hernia. I last saw her on 4/26/2023 for the same issue. She reports that since we last met, there hasn't been any major changes to her hernia. It overall as remained asymptomatic. She denies any pain, nausea, vomiting, skin changes,or obstipation. She denies any abdominal surgeries or significant changes to her health.      Vitals: /62   Pulse 65   Ht 1.549 m (5' 1\")   Wt 59 kg (130 lb)   SpO2 98%   BMI 24.56 kg/m    BMI= Body mass index is 24.56 kg/m .    EXAM:  General: Vital signs reviewed, in no apparent distress  Eyes: Anicteric  HENT: Normocephalic, atraumatic, trachea midline   Respiratory: Breathing nonlabored  Cardiovascular: Regular rate and rhythm  GI: Abdomen soft, nondistended, nontender. Right groin - palpable, small reducible bulge in her right groin, normal overlying skin, mild tenderness, soft. Left groin - No palpable hernia  Musculoskeletal: No gross deformities  Neurologic: Grossly nonfocal exam  Psychiatric: Normal mood, affect and insight  Integumentary: Warm and dry    All labs and imaging personally reviewed and significant for:     CT abd - reviewed imaging again from 4/20/2023 and significant for right inguinal hernia without bowel.    ASSESSMENT:  Dorie Gagnon is a 74 year old who presents with stable right inguinal hernia on exam and CT. We again rediscussed pathophysiology and the pertinent imaging were reviewed with the patient. We discussed watchful waiting versus surgical management.  We discussed the rate of incarceration for women is 3-4 times higher in women than men. Due to this I recommend surgical repair and patient would like to proceed with " surgical management.  I recommend we proceed with robotic inguinal hernia repair with permanent mesh.  I discussed with her that I will examine the other side for any evidence of hernia and repair the other side at the same time. Risk and benefits of the surgery include but is not limited to pain, bleeding, infection, seroma/hematoma formation, possible injury to surrounding structures and organs, and recurrence.  Perioperative course and postop management was also discussed with the patient.  Patient was given ample amount of time to ask questions and all questions were answered appropriately.  Patient is elected proceed with the stated procedure. Significant pertinent co-morbidities include:    PLAN:  Robotic Right Inguinal Hernia Repair, possible Left    It was my pleasure to participate in the care of Dorie Gagnon in clinic today. Thank you for this consultation.         Wyatt Washington MD    Please route or send letter to:  Primary Care Provider (PCP) and Referring Provider

## 2024-11-12 ENCOUNTER — TELEPHONE (OUTPATIENT)
Dept: SURGERY | Facility: CLINIC | Age: 74
End: 2024-11-12
Payer: COMMERCIAL

## 2024-11-12 ENCOUNTER — HOSPITAL ENCOUNTER (OUTPATIENT)
Facility: CLINIC | Age: 74
End: 2024-11-12
Attending: STUDENT IN AN ORGANIZED HEALTH CARE EDUCATION/TRAINING PROGRAM | Admitting: STUDENT IN AN ORGANIZED HEALTH CARE EDUCATION/TRAINING PROGRAM
Payer: COMMERCIAL

## 2024-11-12 RX ORDER — CEFAZOLIN SODIUM/WATER 2 G/20 ML
2 SYRINGE (ML) INTRAVENOUS
Status: CANCELLED | OUTPATIENT
Start: 2024-11-12

## 2024-11-12 RX ORDER — CEFAZOLIN SODIUM/WATER 2 G/20 ML
2 SYRINGE (ML) INTRAVENOUS SEE ADMIN INSTRUCTIONS
Status: CANCELLED | OUTPATIENT
Start: 2024-11-12

## 2024-11-12 NOTE — TELEPHONE ENCOUNTER
Type of surgery: Robotic assisted right inguinal hernia repair  Location of surgery: [facility]  Date and time of surgery: 1/21/25 at 1pm  Surgeon: Dr. Wyatt Washington   Pre-Op Appt Date: patient to schedule  Post-Op Appt Date: patient to schedule   Packet sent out: Yes  Pre-cert/Authorization completed:  Not Applicable  Date: 11/12/24

## 2024-12-19 ENCOUNTER — NURSE TRIAGE (OUTPATIENT)
Dept: NURSING | Facility: CLINIC | Age: 74
End: 2024-12-19
Payer: COMMERCIAL

## 2024-12-19 ENCOUNTER — APPOINTMENT (OUTPATIENT)
Dept: CT IMAGING | Facility: CLINIC | Age: 74
End: 2024-12-19
Attending: EMERGENCY MEDICINE
Payer: COMMERCIAL

## 2024-12-19 ENCOUNTER — HOSPITAL ENCOUNTER (EMERGENCY)
Facility: CLINIC | Age: 74
Discharge: HOME | End: 2024-12-20
Attending: EMERGENCY MEDICINE
Payer: COMMERCIAL

## 2024-12-19 DIAGNOSIS — N30.01 ACUTE HEMORRHAGIC CYSTITIS: ICD-10-CM

## 2024-12-19 LAB
ALBUMIN UR-MCNC: ABNORMAL G/DL
ANION GAP SERPL CALCULATED.3IONS-SCNC: 12 MMOL/L (ref 7–15)
APPEARANCE UR: CLEAR
BASOPHILS # BLD AUTO: 0.1 10E3/UL (ref 0–0.2)
BASOPHILS NFR BLD AUTO: 1 %
BILIRUB UR QL STRIP: ABNORMAL
BUN SERPL-MCNC: 19.9 MG/DL (ref 8–23)
CALCIUM SERPL-MCNC: 9.4 MG/DL (ref 8.8–10.4)
CHLORIDE SERPL-SCNC: 101 MMOL/L (ref 98–107)
COLOR UR AUTO: ABNORMAL
CREAT SERPL-MCNC: 0.97 MG/DL (ref 0.51–0.95)
EGFRCR SERPLBLD CKD-EPI 2021: 60 ML/MIN/1.73M2
EOSINOPHIL # BLD AUTO: 0.1 10E3/UL (ref 0–0.7)
EOSINOPHIL NFR BLD AUTO: 1 %
ERYTHROCYTE [DISTWIDTH] IN BLOOD BY AUTOMATED COUNT: 13 % (ref 10–15)
GLUCOSE SERPL-MCNC: 93 MG/DL (ref 70–99)
GLUCOSE UR STRIP-MCNC: ABNORMAL MG/DL
HCO3 SERPL-SCNC: 24 MMOL/L (ref 22–29)
HCT VFR BLD AUTO: 34.4 % (ref 35–47)
HGB BLD-MCNC: 11.5 G/DL (ref 11.7–15.7)
HGB UR QL STRIP: ABNORMAL
IMM GRANULOCYTES # BLD: 0 10E3/UL
IMM GRANULOCYTES NFR BLD: 0 %
KETONES UR STRIP-MCNC: ABNORMAL MG/DL
LEUKOCYTE ESTERASE UR QL STRIP: ABNORMAL
LYMPHOCYTES # BLD AUTO: 1.5 10E3/UL (ref 0.8–5.3)
LYMPHOCYTES NFR BLD AUTO: 14 %
MCH RBC QN AUTO: 30 PG (ref 26.5–33)
MCHC RBC AUTO-ENTMCNC: 33.4 G/DL (ref 31.5–36.5)
MCV RBC AUTO: 90 FL (ref 78–100)
MONOCYTES # BLD AUTO: 0.7 10E3/UL (ref 0–1.3)
MONOCYTES NFR BLD AUTO: 6 %
NEUTROPHILS # BLD AUTO: 8.4 10E3/UL (ref 1.6–8.3)
NEUTROPHILS NFR BLD AUTO: 78 %
NITRATE UR QL: ABNORMAL
NRBC # BLD AUTO: 0 10E3/UL
NRBC BLD AUTO-RTO: 0 /100
PH UR STRIP: ABNORMAL [PH]
PLATELET # BLD AUTO: 271 10E3/UL (ref 150–450)
POTASSIUM SERPL-SCNC: 4.1 MMOL/L (ref 3.4–5.3)
RBC # BLD AUTO: 3.83 10E6/UL (ref 3.8–5.2)
RBC URINE: >182 /HPF
SODIUM SERPL-SCNC: 137 MMOL/L (ref 135–145)
SP GR UR STRIP: 1.01 (ref 1–1.03)
UROBILINOGEN UR STRIP-MCNC: ABNORMAL MG/DL
WBC # BLD AUTO: 10.8 10E3/UL (ref 4–11)
WBC URINE: >182 /HPF

## 2024-12-19 PROCEDURE — 74177 CT ABD & PELVIS W/CONTRAST: CPT

## 2024-12-19 PROCEDURE — 87086 URINE CULTURE/COLONY COUNT: CPT | Performed by: EMERGENCY MEDICINE

## 2024-12-19 PROCEDURE — 80048 BASIC METABOLIC PNL TOTAL CA: CPT | Performed by: EMERGENCY MEDICINE

## 2024-12-19 PROCEDURE — 250N000011 HC RX IP 250 OP 636: Performed by: EMERGENCY MEDICINE

## 2024-12-19 PROCEDURE — 36415 COLL VENOUS BLD VENIPUNCTURE: CPT | Performed by: EMERGENCY MEDICINE

## 2024-12-19 PROCEDURE — 82374 ASSAY BLOOD CARBON DIOXIDE: CPT | Performed by: EMERGENCY MEDICINE

## 2024-12-19 PROCEDURE — 99285 EMERGENCY DEPT VISIT HI MDM: CPT | Mod: 25

## 2024-12-19 PROCEDURE — 87186 SC STD MICRODIL/AGAR DIL: CPT | Performed by: EMERGENCY MEDICINE

## 2024-12-19 PROCEDURE — 250N000009 HC RX 250: Performed by: EMERGENCY MEDICINE

## 2024-12-19 PROCEDURE — 81001 URINALYSIS AUTO W/SCOPE: CPT | Performed by: EMERGENCY MEDICINE

## 2024-12-19 PROCEDURE — 85004 AUTOMATED DIFF WBC COUNT: CPT | Performed by: EMERGENCY MEDICINE

## 2024-12-19 RX ORDER — IOPAMIDOL 755 MG/ML
65 INJECTION, SOLUTION INTRAVASCULAR ONCE
Status: COMPLETED | OUTPATIENT
Start: 2024-12-19 | End: 2024-12-19

## 2024-12-19 RX ORDER — CEPHALEXIN 250 MG/5ML
500 POWDER, FOR SUSPENSION ORAL 3 TIMES DAILY
Qty: 210 ML | Refills: 0 | Status: SHIPPED | OUTPATIENT
Start: 2024-12-19 | End: 2024-12-26

## 2024-12-19 RX ADMIN — SODIUM CHLORIDE 60 ML: 9 INJECTION, SOLUTION INTRAVENOUS at 23:18

## 2024-12-19 RX ADMIN — IOPAMIDOL 65 ML: 755 INJECTION, SOLUTION INTRAVENOUS at 23:18

## 2024-12-19 ASSESSMENT — COLUMBIA-SUICIDE SEVERITY RATING SCALE - C-SSRS
1. IN THE PAST MONTH, HAVE YOU WISHED YOU WERE DEAD OR WISHED YOU COULD GO TO SLEEP AND NOT WAKE UP?: NO
2. HAVE YOU ACTUALLY HAD ANY THOUGHTS OF KILLING YOURSELF IN THE PAST MONTH?: NO
6. HAVE YOU EVER DONE ANYTHING, STARTED TO DO ANYTHING, OR PREPARED TO DO ANYTHING TO END YOUR LIFE?: NO

## 2024-12-19 ASSESSMENT — ACTIVITIES OF DAILY LIVING (ADL)
ADLS_ACUITY_SCORE: 41

## 2024-12-20 VITALS
OXYGEN SATURATION: 97 % | SYSTOLIC BLOOD PRESSURE: 170 MMHG | RESPIRATION RATE: 18 BRPM | HEIGHT: 61 IN | HEART RATE: 74 BPM | DIASTOLIC BLOOD PRESSURE: 85 MMHG | BODY MASS INDEX: 24.55 KG/M2 | WEIGHT: 130 LBS | TEMPERATURE: 98.7 F

## 2024-12-20 NOTE — ED TRIAGE NOTES
Pt c/o urinary urgency and frequency that started this evening, noted blood in urine. Endorses right lower abd discomfort. Denies fever, chills, nausea or vomiting.      Triage Assessment (Adult)       Row Name 12/19/24 2029          Triage Assessment    Airway WDL WDL        Respiratory WDL    Respiratory WDL WDL        Cardiac WDL    Cardiac WDL WDL        Peripheral/Neurovascular WDL    Peripheral Neurovascular WDL WDL        Cognitive/Neuro/Behavioral WDL    Cognitive/Neuro/Behavioral WDL WDL

## 2024-12-20 NOTE — ED PROVIDER NOTES
Emergency Department Note      History of Present Illness     Chief Complaint   Hematuria      HPI   Dorie Gagnon is a 74 year old female with history of GERD, right inguinal hernia amongst others presents with hematuria and urinary frequency.  Today she noted blood per urine with urgency and pain.  She notes the pain is increased in across her lower abdomen going into the right side.  Denies fevers or chills.  She has not had recurrent UTIs in the past and thinks she had a UTI may be 20 some years ago.  No history of cancer.  No history of kidney stone.    Independent Historian   None    Review of External Notes   Clinic notes outside CT reports    Past Medical History     Medical History and Problem List   Past Medical History:   Diagnosis Date    Allergy to cats     Arthritis 2015    Family history of ischemic heart disease     Hidradenitis     Influenza with other manifestations 1972    Menopause 1993    Mixed hyperlipidemia     Osteopenia 2003    Status post bunionectomy 05/2012       Medications   albuterol (PROAIR HFA/PROVENTIL HFA/VENTOLIN HFA) 108 (90 Base) MCG/ACT inhaler  alendronate (FOSAMAX) 70 MG tablet  atenolol (TENORMIN) 25 MG tablet  atorvastatin (LIPITOR) 20 MG tablet  carboxymethylcellulose PF (REFRESH PLUS) 0.5 % SOLN ophthalmic solution  cephALEXin (KEFLEX) 250 MG/5ML suspension  docusate sodium (COLACE) 100 MG capsule  melatonin 1 MG TABS  Multiple Vitamins-Minerals (PRESERVISION AREDS 2) CHEW  nifedipine 0.2% in white petrolatum 0.2 % OINT ointment  omeprazole (PRILOSEC) 20 MG DR capsule  polyethylene glycol 0.4%- propylene glycol 0.3% (SYSTANE ULTRA) 0.4-0.3 % SOLN ophthalmic solution  Probiotic Product (PROBIOTIC-10) CAPS  Psyllium (METAMUCIL) 0.36 g CAPS  tiZANidine (ZANAFLEX) 2 MG tablet  triamcinolone (KENALOG) 0.1 % external cream        Surgical History   Past Surgical History:   Procedure Laterality Date    BUNIONECTOMY Left 4/24/2019    Procedure: LEFT HALLIX VAGUS AND SECOND  "CLAWTOE RECONSTRUCTION;  Surgeon: Tyler Jameson MD;  Location:  OR    BUNIONECTOMY KATIE  2012    Procedure:BUNIONECTOMY KATIE; RIGHT FOOT EULA KATIE BUNIONECTOMY, HAMMER TOE RECONSTRUCTION DIGITS TWO, THREE, FOUR AND FIVE; Surgeon:DEANDRE MONTE; Location: [place]    C DEXA INTERPRETATION, AXIAL  2003    T score femur -1.3, lumbar -1.0    C DEXA INTERPRETATION, AXIAL  2004    T score lumbar -1.2, femoral -1.4    C DEXA INTERPRETATION, AXIAL  2007    T score lumbar -1.5 (decline -2.8%/yr), femoral neck -1.3/-1.8 (decline -1.8%/yr)    COLONOSCOPY  1993    COLONOSCOPY  2000    normal, repeat 10 years    COLONOSCOPY  10/2010    normal, repeat 5 years    DEXA  10/2010    T score lumbar -1.2(stable), femoral neck -1.8/-2.2 with BMD 0.755 (6% worsening)    HC TOOTH EXTRACTION W/FORCEP      wisdom teeth extracted    REPAIR HAMMER TOE  2012    Procedure:REPAIR HAMMER TOE; Surgeon:DEANDRE MONTE; Location: [place]    STRESS ECHO  10/2010    normal LV function at rest, hyperdynamic with exercise, EF 55-60% at rest and 70% post exercise    STRESS ECHO  10/2010    negative for echo/EKG changes of ischemia, normal LV fct with EF 55-60%, systolic fct hyperdynamic with exercise, exercised 7 min, EK.5 mm upsloping ST depression inf lead    STRESS ECHO (METRO)  2004    negative, exercise 8-9 min    [facility] DEXA INTERPRETATION, AXIAL  2014    T score L1-4 of -1.4 (dec 2.7%), femoral neck -1.9/-2.2 with BMD 0.728 (dec 0.8%)       Physical Exam     Patient Vitals for the past 24 hrs:   BP Temp Temp src Pulse Resp SpO2 Height Weight   24 (!) 170/85 98.7  F (37.1  C) Temporal 74 18 97 % 1.549 m (5' 1\") 59 kg (130 lb)     Physical Exam  GENERAL: well developed, pleasant  HEAD: atraumatic  EYES: pupils reactive, extraocular muscles intact, conjunctivae normal  ENT:  mucus membranes moist  NECK:  trachea midline, normal range of motion  RESPIRATORY: no tachypnea, breath sounds clear to " auscultation   CVS: normal S1/S2, no murmurs, intact distal pulses  ABDOMEN: Lower abdominal pain no guarding  MUSCULOSKELETAL: no deformities  SKIN: warm and dry, no acute rashes or ulceration  NEURO: GCS 15, cranial nerves intact, alert and oriented x3  PSYCH:  Mood/affect normal      Diagnostics     Lab Results   Labs Ordered and Resulted from Time of ED Arrival to Time of ED Departure   ROUTINE UA WITH MICROSCOPIC REFLEX TO CULTURE - Abnormal       Result Value    Color Urine Red (*)     Appearance Urine Clear      Glucose Urine        Bilirubin Urine        Ketones Urine        Specific Gravity Urine 1.007      Blood Urine Large (*)     pH Urine        Protein Albumin Urine        Urobilinogen Urine        Nitrite Urine        Leukocyte Esterase Urine        RBC Urine >182 (*)     WBC Urine >182 (*)    BASIC METABOLIC PANEL - Abnormal    Sodium 137      Potassium 4.1      Chloride 101      Carbon Dioxide (CO2) 24      Anion Gap 12      Urea Nitrogen 19.9      Creatinine 0.97 (*)     GFR Estimate 60 (*)     Calcium 9.4      Glucose 93     CBC WITH PLATELETS AND DIFFERENTIAL - Abnormal    WBC Count 10.8      RBC Count 3.83      Hemoglobin 11.5 (*)     Hematocrit 34.4 (*)     MCV 90      MCH 30.0      MCHC 33.4      RDW 13.0      Platelet Count 271      % Neutrophils 78      % Lymphocytes 14      % Monocytes 6      % Eosinophils 1      % Basophils 1      % Immature Granulocytes 0      NRBCs per 100 WBC 0      Absolute Neutrophils 8.4 (*)     Absolute Lymphocytes 1.5      Absolute Monocytes 0.7      Absolute Eosinophils 0.1      Absolute Basophils 0.1      Absolute Immature Granulocytes 0.0      Absolute NRBCs 0.0     URINE CULTURE       Imaging   CT Abdomen Pelvis w Contrast   Final Result   IMPRESSION:    1.  Severe bladder wall thickening and inflammation consistent with a cystitis. No hydronephrosis. No CT evidence of pyelonephritis.              Independent Interpretation   None    ED Course      Medications  Administered   Medications   iopamidol (ISOVUE-370) solution 65 mL (65 mLs Intravenous $Given 12/19/24 7573)   sodium chloride 0.9 % bag 500mL for CT scan flush use (60 mLs Intravenous $Given 12/19/24 2318)       Procedures   Procedures     Discussion of Management   None    ED Course        Additional Documentation  None    Medical Decision Making / Diagnosis     CMS Diagnoses: None    MIPS       None    [facility]   Dorie Gagnon is a 74 year old female presents with lower abdominal pain, urinary frequency and urgency and significant hematuria.  She notes symptoms all came on together without history of this in the past.  Patient is generalized lower abdominal pain both central as well as right lower radiating a bit into the right side.  She has not had a history of this before.  Labs and CT are obtained and negative for stone or pyelonephritis or malignancy.  Labs are reassuring.  Discussed findings with her.  She is requesting a liquid medication if possible.  There is no prior urine cultures in the system.  And ordered liquid Keflex.    Disposition   The patient was discharged.     Diagnosis     ICD-10-CM    1. Acute hemorrhagic cystitis  N30.01            Discharge Medications   New Prescriptions    CEPHALEXIN (KEFLEX) 250 MG/5ML SUSPENSION    Take 10 mLs (500 mg) by mouth 3 times daily for 7 days.         MD Issa Fofana, Zachary VIVEROS MD  12/20/24 0000

## 2024-12-20 NOTE — TELEPHONE ENCOUNTER
Patient went to the bathroom and noticed blood in her urine.   Patient is having urgency and frequency.        Reason for Disposition   [1] Pink or red-colored urine and likely from food (beets, rhubarb, red food dye) AND [2] lasts > 24 hours after stopping food    Additional Information   Negative: Shock suspected (e.g., cold/pale/clammy skin, too weak to stand, low BP, rapid pulse)   Negative: Sounds like a life-threatening emergency to the triager   Negative: Urinary catheter, questions about   Negative: Urinary catheter and spinal cord injury, questions about   Negative: Hospice patient with urinary catheter   Negative: Recent back or abdominal injury   Negative: Recent genital injury   Negative: [1] Unable to urinate (or only a few drops) > 4 hours AND [2] bladder feels very full (e.g., palpable bladder or strong urge to urinate)   Negative: [1] Diffuse (all over) muscle pains in the shoulders, arms, legs, and back AND [2] dark (cola or tea-colored) or red-colored urine   Negative: Passing pure blood or large blood clots (i.e., size > a dime)  (Exception: Casimiro or small strands.)   Negative: Fever > 100.4 F (38.0 C)   Negative: Patient sounds very sick or weak to the triager   Negative: [1] Pain or burning with passing urine AND [2] side (flank) or back pain present   Negative: Known sickle cell disease   Negative: Taking Coumadin (warfarin) or other strong blood thinner, or known bleeding disorder (e.g., thrombocytopenia)   Negative: Pain or burning with passing urine   Negative: Side (flank) or back pain present    Protocols used: Urine - Blood In-A-

## 2024-12-21 LAB — BACTERIA UR CULT: ABNORMAL

## 2024-12-22 ENCOUNTER — TELEPHONE (OUTPATIENT)
Dept: NURSING | Facility: CLINIC | Age: 74
End: 2024-12-22
Payer: COMMERCIAL

## 2024-12-22 NOTE — TELEPHONE ENCOUNTER
[facility]    Reason for call: Lab Result Notification     Lab Result (including Rx patient on, if applicable).  If culture, copy of lab report at bottom.  Lab Result: Urine Culture - See Below    ED Rx: cephALEXin (KEFLEX) 250 MG/5ML suspension - Take 10 mLs (500 mg) by mouth 3 times daily for 7 days (SUSCEPTIBLE)    Creatinine Level (mg/dl)   Creatinine   Date Value Ref Range Status   12/19/2024 0.97 (H) 0.51 - 0.95 mg/dL Final   01/14/2021 0.75 0.52 - 1.04 mg/dL Final    Creatinine clearance (ml/min), if applicable    Serum creatinine: 0.97 mg/dL (H) 12/19/24 2154  Estimated creatinine clearance: 40.7 mL/min (A)     ED Symptoms: Presented to the ED with hematuria, urinary frequency, urinary urgency, abdominal pain and dysuria.      Current Symptoms: Patient states that she feels better. Still having a little lower abdominal discomfort.  Denies any new or worsening symptoms.     RN Recommendations/Instructions per [facility] ED lab result protocol:   [facility] ED lab result protocol utilized: Urine Culture  Continue antibiotic/medication as prescribed: Cephalexin    Patient/care giver notified to contact your PCP clinic or return to the Emergency department if your:  Symptoms do not resolve after completing antibiotic.  Symptoms worsen or other concerning symptoms.       SIMONA ISRAEL RN

## 2024-12-23 ENCOUNTER — PATIENT OUTREACH (OUTPATIENT)
Dept: FAMILY MEDICINE | Facility: CLINIC | Age: 74
End: 2024-12-23
Payer: COMMERCIAL

## 2024-12-23 NOTE — TELEPHONE ENCOUNTER
Patient is requesting an order for a repeat UA/UC be placed so she can have it done on on Friday after she finishes her antibiotic on Thursday. Is provider okay with placing order or should patient wait until appointment on 1/2/25.         Transitions of Care Outreach  Chief Complaint   Patient presents with    Hospital F/U     ED 12/20 Hematuria       Most Recent Admission Date: 12/19/2024   Most Recent Admission Diagnosis:      Most Recent Discharge Date: 12/20/2024   Most Recent Discharge Diagnosis: Acute hemorrhagic cystitis - N30.01     Transitions of Care Assessment    Discharge Assessment  How are you doing now that you are home?: No longer having blood in urine  How are your symptoms? (Red Flag symptoms escalate to triage hotline per guidelines): Improved  Do you know how to contact your clinic care team if you have future questions or changes to your health status? : Yes  Does the patient have their discharge instructions? : Yes  Does the patient have questions regarding their discharge instructions? : No  Were you started on any new medications or were there changes to any of your previous medications? : Yes  Does the patient have all of their medications?: Yes  Do you have questions regarding any of your medications? : No  Do you have all of your needed medical supplies or equipment (DME)?  (i.e. oxygen tank, CPAP, cane, etc.): Yes    Follow up Plan     Discharge Follow-Up  Discharge follow up appointment scheduled in alignment with recommended follow up timeframe or Transitions of Risk Category? (Low = within 30 days; Moderate= within 14 days; High= within 7 days): Yes  Discharge Follow Up Appointment Date: 01/02/25  Discharge Follow Up Appointment Scheduled with?: Primary Care Provider    Future Appointments   Date Time Provider Department Center   1/2/2025  1:30 PM Lori Escamilla MD EC EC   1/21/2025 12:45 PM Wyatt Washington MD S[facility]UR S[facility]X   4/4/2025 11:30 AM Lori Escamilla MD [facility] EC   6/18/2025  11:00 AM [id number] CV[facility]G       Outpatient Plan as outlined on AVS reviewed with patient.    For any urgent concerns, please contact our 24 hour nurse triage line: [phone] ([phone])       Magdalena Silvestre RN

## 2024-12-26 ENCOUNTER — TELEPHONE (OUTPATIENT)
Dept: FAMILY MEDICINE | Facility: CLINIC | Age: 74
End: 2024-12-26
Payer: COMMERCIAL

## 2024-12-26 NOTE — TELEPHONE ENCOUNTER
Reason for Call:  Appointment Request    Patient requesting this type of appt:  Hospital/ED Follow-Up     Requested provider: Lori Escamilla    Reason patient unable to be scheduled:  Patient was not sure if she should wait until her pre op to do the hospital follow up      Comments: Should patient come in sooner or is it okay to wait until January 2nd?    Could we send this information to you in [facility] or would you prefer to receive a phone call?:   Patient would prefer a phone call   Okay to leave a detailed message?: Yes at Home number on file [phone] (home)    Call taken on 12/26/2024 at 2:57 PM by Nettie Giordano

## 2024-12-26 NOTE — TELEPHONE ENCOUNTER
I can take care of this in her upcoming OV as scheduled for preop clearance on 1/2/2025.   No additional appt needed for ER follow up.     Thank you  Lori Escamilla MD on 12/26/2024 at 3:06 PM

## 2025-01-02 ENCOUNTER — OFFICE VISIT (OUTPATIENT)
Dept: FAMILY MEDICINE | Facility: CLINIC | Age: 75
End: 2025-01-02
Payer: COMMERCIAL

## 2025-01-02 VITALS
WEIGHT: 134.7 LBS | HEART RATE: 55 BPM | HEIGHT: 61 IN | OXYGEN SATURATION: 98 % | SYSTOLIC BLOOD PRESSURE: 139 MMHG | RESPIRATION RATE: 16 BRPM | DIASTOLIC BLOOD PRESSURE: 80 MMHG | BODY MASS INDEX: 25.43 KG/M2 | TEMPERATURE: 99.1 F

## 2025-01-02 DIAGNOSIS — M51.361 DEGENERATION OF INTERVERTEBRAL DISC OF LUMBAR REGION WITH LOWER EXTREMITY PAIN: ICD-10-CM

## 2025-01-02 DIAGNOSIS — I10 ESSENTIAL HYPERTENSION: ICD-10-CM

## 2025-01-02 DIAGNOSIS — N30.01 ACUTE CYSTITIS WITH HEMATURIA: Primary | ICD-10-CM

## 2025-01-02 DIAGNOSIS — R20.2 PARESTHESIA OF LOWER EXTREMITY: ICD-10-CM

## 2025-01-02 PROBLEM — M79.672 LEFT FOOT PAIN: Status: RESOLVED | Noted: 2024-04-09 | Resolved: 2025-01-02

## 2025-01-02 LAB
ALBUMIN UR-MCNC: NEGATIVE MG/DL
APPEARANCE UR: CLEAR
BACTERIA #/AREA URNS HPF: ABNORMAL /HPF
BILIRUB UR QL STRIP: NEGATIVE
COLOR UR AUTO: YELLOW
GLUCOSE UR STRIP-MCNC: NEGATIVE MG/DL
HGB UR QL STRIP: NEGATIVE
KETONES UR STRIP-MCNC: NEGATIVE MG/DL
LEUKOCYTE ESTERASE UR QL STRIP: NEGATIVE
NITRATE UR QL: NEGATIVE
PH UR STRIP: 6 [PH] (ref 5–7)
RBC #/AREA URNS AUTO: ABNORMAL /HPF
SP GR UR STRIP: 1.01 (ref 1–1.03)
SQUAMOUS #/AREA URNS AUTO: ABNORMAL /LPF
UROBILINOGEN UR STRIP-ACNC: 0.2 E.U./DL
WBC #/AREA URNS AUTO: ABNORMAL /HPF

## 2025-01-02 RX ORDER — TIZANIDINE 2 MG/1
2 TABLET ORAL
Qty: 30 TABLET | Refills: 0 | Status: SHIPPED | OUTPATIENT
Start: 2025-01-02

## 2025-01-02 ASSESSMENT — ASTHMA QUESTIONNAIRES
QUESTION_5 LAST FOUR WEEKS HOW WOULD YOU RATE YOUR ASTHMA CONTROL: COMPLETELY CONTROLLED
QUESTION_4 LAST FOUR WEEKS HOW OFTEN HAVE YOU USED YOUR RESCUE INHALER OR NEBULIZER MEDICATION (SUCH AS ALBUTEROL): NOT AT ALL
QUESTION_1 LAST FOUR WEEKS HOW MUCH OF THE TIME DID YOUR ASTHMA KEEP YOU FROM GETTING AS MUCH DONE AT WORK, SCHOOL OR AT HOME: NONE OF THE TIME
ACT_TOTALSCORE: 25
QUESTION_2 LAST FOUR WEEKS HOW OFTEN HAVE YOU HAD SHORTNESS OF BREATH: NOT AT ALL
ACT_TOTALSCORE: 25
QUESTION_3 LAST FOUR WEEKS HOW OFTEN DID YOUR ASTHMA SYMPTOMS (WHEEZING, COUGHING, SHORTNESS OF BREATH, CHEST TIGHTNESS OR PAIN) WAKE YOU UP AT NIGHT OR EARLIER THAN USUAL IN THE MORNING: NOT AT ALL

## 2025-01-02 ASSESSMENT — PAIN SCALES - GENERAL: PAINLEVEL_OUTOF10: NO PAIN (0)

## 2025-01-02 NOTE — PROGRESS NOTES
Assessment and Plan  1. Acute cystitis with hematuria (Primary)    Recent ER visit on 12/19/24 - lower abdominal pain and hematuria. Acute hemorrhagic cystitis and discharged on Keflex.  CT Abdomen Pelvis w Contrast  Final Result  IMPRESSION:   1.  Severe bladder wall thickening and inflammation consistent with a cystitis. No hydronephrosis. No CT evidence of pyelonephritis.    Physical exam in office today is negative for any suprapubic tenderness, no CVAT.  Discussed on patient if she Will need Cystoscopy / urology.  Shared decision to await for urine exam results before further recommendations.  Patient understood and agreed with plan.  - REVIEW OF HEALTH MAINTENANCE PROTOCOL ORDERS  - UA with Microscopic - lab collect; Future  - Urine Culture Aerobic Bacterial - lab collect; Future  - UA with Microscopic - lab collect  - Urine Culture Aerobic Bacterial - lab collect  - Urine Microscopic Exam    2. Degeneration of intervertebral disc of lumbar region with lower extremity pain  3. Paresthesia of lower extremity  New problem identified on the CT scan done for patient cystitis and hematuria above, given patient's on and off radiating paresthesias on the lower extremity shared decision for physical therapy along with muscle relaxants for acute symptoms as needed.  Side effects of the medication explained.  - Physical Therapy  Referral; Future  - tiZANidine (ZANAFLEX) 2 MG tablet; Take 1 tablet (2 mg) by mouth nightly as needed for muscle spasms.  Dispense: 30 tablet; Refill: 0    4. Essential hypertension  Chronic problem, borderline elevated in office today.  Patient states it is whitecoat hypertension, will continue current atenolol at the same dose.  Patient will update me on BP log for 10 days as mentioned in the AVS below.  Patient understood and agreed with the plan.        The longitudinal plan of care for the diagnosis(es)/condition(s) as documented were addressed during this visit. Due to the added  complexity in care, I will continue to support Dorie in the subsequent management and with ongoing continuity of care.      Please note that this note consists of symbols derived from keyboarding, dictation and/or voice recognition software. As a result, there may be errors in the script that have gone undetected. Please consider this when interpreting information found in this chart.    Patient Instructions   As discussed , will recheck your Urine exam and see if its resolved.   Since you are asymptomatic will treat only if culture is positive.     Please check your BP at home for next 10 days, before deciding on changes on BP medication.     Will give muscle relaxor for your back symptoms and Physical therapy will help     =======================      Return in about 3 months (around 4/4/2025), or if symptoms worsen or fail to improve, for Annual Wellness Exam.    Lori Escamilla MD  [facility] SHAMIKA Oshea is a 74 year old, presenting for the following health issues:  ER F/U (Hematuria)        1/2/2025     1:27 PM   Additional Questions   Roomed by Jade MONREAL     [facility]     ED/UC Followup:    Facility:  [facility] ED  Date of visit: 12-19/20  Reason for visit: Acute hemorrhagic cystitis  Current Status: Stable no pain      Last seen pt on 8/2024 for follow up of HTN at that time. She had bilateral hip pain as well as X ray done at that time - Primary osteoarthritis of both hips & Arthritis of sacroiliac joint with PT referral given for parasthesias of lower extremity. She is here for ER follow up.      No Known Allergies     Past Medical History:   Diagnosis Date    Allergy to cats     nasal symptoms    Arthritis 2015    x-ray    Essential hypertension 1/2/2025    Family history of ischemic heart disease     parents and brother with CAD onset age 60s    Hidradenitis     recurrent, groin    Influenza with other manifestations 1972    hospitalized    Menopause  1993    age 43    Mixed hyperlipidemia     Osteopenia 2003 FRAX: 12% risk major osteoporotic fx, 2.2% risk hip fx    Status post bunionectomy 2012    right with hammertoe repairs       Past Surgical History:   Procedure Laterality Date    BUNIONECTOMY Left 2019    Procedure: LEFT HALLIX VAGUS AND SECOND CLAWTOE RECONSTRUCTION;  Surgeon: Tyler Jameson MD;  Location:  OR    BUNIONECTOMY KATIE  2012    Procedure:BUNIONECTOMY KATIE; RIGHT FOOT EULA KATIE BUNIONECTOMY, HAMMER TOE RECONSTRUCTION DIGITS TWO, THREE, FOUR AND FIVE; Surgeon:DEANDRE MONTE; Location: [facility]    C DEXA INTERPRETATION, AXIAL  2003    T score femur -1.3, lumbar -1.0    C DEXA INTERPRETATION, AXIAL  2004    T score lumbar -1.2, femoral -1.4    C DEXA INTERPRETATION, AXIAL  2007    T score lumbar -1.5 (decline -2.8%/yr), femoral neck -1.3/-1.8 (decline -1.8%/yr)    COLONOSCOPY      COLONOSCOPY  2000    normal, repeat 10 years    COLONOSCOPY  10/2010    normal, repeat 5 years    DEXA  10/2010    T score lumbar -1.2(stable), femoral neck -1.8/-2.2 with BMD 0.755 (6% worsening)    HC TOOTH EXTRACTION W/FORCEP      wisdom teeth extracted    REPAIR HAMMER TOE  2012    Procedure:REPAIR HAMMER TOE; Surgeon:DEANDRE MONTE; Location: [place]    STRESS ECHO  10/2010    normal LV function at rest, hyperdynamic with exercise, EF 55-60% at rest and 70% post exercise    STRESS ECHO  10/2010    negative for echo/EKG changes of ischemia, normal LV fct with EF 55-60%, systolic fct hyperdynamic with exercise, exercised 7 min, EK.5 mm upsloping ST depression inf lead    STRESS ECHO (METRO)  2004    negative, exercise 8-9 min    [facility] DEXA INTERPRETATION, AXIAL  2014    T score L1-4 of -1.4 (dec 2.7%), femoral neck -1.9/-2.2 with BMD 0.728 (dec 0.8%)       Family History   Problem Relation Age of Onset    C.A.D. Father         MI age 61,   age 76 from MI    Diabetes Father     Cancer Father          esophagus, former smoker    Obesity Father     Coronary Artery Disease Father             Other Cancer Father     C.A.D. Mother         MI age 62    Lipids Mother     Blood Disease Mother         pernicious anemia    Thyroid Disease Mother     Eye Disorder Mother         macular degeneration    Osteoporosis Mother     Connective Tissue Disorder Mother         SLE - remission    Hypertension Mother     Hyperlipidemia Mother     Asthma Mother     Dementia Mother         memory loss, onset early 90s    Coronary Artery Disease Mother             C.A.D. Maternal Grandmother          early 80s    C.A.D. Maternal Grandfather          at younger age    C.A.D. Paternal Grandmother          late 70s    Hypertension Brother     Prostate Cancer Brother     Colon Polyps Brother         sister may also have colon polyps    Coronary Artery Disease Brother         angioplasty    Asthma Brother     Obesity Brother         No longer obese    Arrhythmia Brother     Hyperlipidemia Sister     Osteoporosis Sister         she has osteopenia    Colon Polyps Sister         possible       Social History     Tobacco Use    Smoking status: Never    Smokeless tobacco: Never   Substance Use Topics    Alcohol use: Yes     Comment: Wine occasionally        Current Outpatient Medications   Medication Sig Dispense Refill    albuterol (PROAIR HFA/PROVENTIL HFA/VENTOLIN HFA) 108 (90 Base) MCG/ACT inhaler INHALE 2 PUFFS INTO THE LUNGS EVERY 6 HOURS AS NEEDED FOR SHORTNESS OF BREATH, WHEEZING OR COUGH 6.7 g 1    alendronate (FOSAMAX) 70 MG tablet TAKE 1 TABLET BY MOUTH EVERY 7 DAYS 12 tablet 2    atenolol (TENORMIN) 25 MG tablet Take 1 tablet (25 mg) by mouth daily. 90 tablet 3    atorvastatin (LIPITOR) 20 MG tablet TAKE 1 TABLET BY MOUTH EVERY DAY 90 tablet 2    carboxymethylcellulose PF (REFRESH PLUS) 0.5 % SOLN ophthalmic solution Place 1-2 drops into both eyes At Bedtime      docusate sodium (COLACE) 100 MG capsule Take 1  "capsule (100 mg) by mouth daily as needed for constipation. 100 capsule 12    melatonin 1 MG TABS Take 2 mg by mouth At Bedtime       Multiple Vitamins-Minerals (PRESERVISION AREDS 2) CHEW Take by mouth 2 times daily      nifedipine 0.2% in white petrolatum 0.2 % OINT ointment APPLY TOPICALLY TWICE DAILY AS DIRECTED      nifedipine 0.2% in white petrolatum 0.2 % OINT ointment APPLY TOPICALLY TWICE DAILY AS DIRECTED 100 g 0    omeprazole (PRILOSEC) 20 MG DR capsule Take 1 capsule (20 mg) by mouth 2 times daily 180 capsule 2    polyethylene glycol 0.4%- propylene glycol 0.3% (SYSTANE ULTRA) 0.4-0.3 % SOLN ophthalmic solution Place 1-2 drops into both eyes daily as needed for dry eyes      Probiotic Product (PROBIOTIC-10) CAPS Take 1 capsule by mouth daily      Psyllium (METAMUCIL) 0.36 g CAPS Metamucil powder daily      tiZANidine (ZANAFLEX) 2 MG tablet Take 1 tablet (2 mg) by mouth nightly as needed for muscle spasms. 30 tablet 0    tiZANidine (ZANAFLEX) 2 MG tablet Take 1 tablet at bedtime as needed 30 tablet 0    triamcinolone (KENALOG) 0.1 % external cream Apply topically 2 times daily. 45 g 0     No current facility-administered medications for this visit.          Review of Systems  Constitutional, HEENT, cardiovascular, pulmonary, GI, , musculoskeletal, neuro, skin, endocrine and psych systems are negative, except as otherwise noted.      Objective    /80   Pulse 55   Temp 99.1  F (37.3  C) (Temporal)   Resp 16   Ht 1.56 m (5' 1.42\")   Wt 61.1 kg (134 lb 11.2 oz)   SpO2 98%   BMI 25.11 kg/m    Body mass index is 25.11 kg/m .  Physical Exam   GENERAL: alert and no distress  NECK: no adenopathy, no asymmetry, masses, or scars  RESP: lungs clear to auscultation - no rales, rhonchi or wheezes  CV: regular rate and rhythm, normal S1 S2, no S3 or S4, no murmur, click or rub, no peripheral edema  ABDOMEN: soft, nontender, no hepatosplenomegaly, no masses and bowel sounds normal  MS: no gross " musculoskeletal defects noted, no edema      Signed Electronically by: Lori Escamilla MD

## 2025-01-02 NOTE — PATIENT INSTRUCTIONS
As discussed , will recheck your Urine exam and see if its resolved.   Since you are asymptomatic will treat only if culture is positive.     Please check your BP at home for next 10 days, before deciding on changes on BP medication.     Will give muscle relaxor for your back symptoms and Physical therapy will help     =======================

## 2025-01-04 LAB — BACTERIA UR CULT: NORMAL

## 2025-01-29 DIAGNOSIS — R07.89 CHEST WALL PAIN: ICD-10-CM

## 2025-01-30 RX ORDER — TIZANIDINE 2 MG/1
TABLET ORAL
Qty: 30 TABLET | Refills: 0 | Status: SHIPPED | OUTPATIENT
Start: 2025-01-30

## 2025-01-30 NOTE — TELEPHONE ENCOUNTER
Refill done.    Unable to give 90 days as requested - since she should take only if needed - due to side effects on daily basis. Will need re-evaluation before continuing this indefinitely.    Thank you  Lori Escamilla MD on 1/30/2025

## 2025-01-31 ENCOUNTER — NURSE TRIAGE (OUTPATIENT)
Dept: FAMILY MEDICINE | Facility: CLINIC | Age: 75
End: 2025-01-31

## 2025-01-31 NOTE — TELEPHONE ENCOUNTER
"Nurse Triage SBAR    Is this a 2nd Level Triage? YES, LICENSED PRACTITIONER REVIEW IS REQUIRED    Situation: Pt called the clinic concerned about constipation.     Background: Pts last BM was yesterday. Pt takes 2 laxatives (5mg bisacodyl), miralax (last took yesterday. Pt takes it at night). Pt has been taking gas x.      Pt has been trying to drink 2L of water/day, eating more veggies and walking more.     Assessment: Pts last BM was yesterday. Pt also reports feeling uncomfortable. Pt reports a lot of belching. Pt reports abd bloating.     Pt denies rectal bleeding, vomiting fever.      Protocol Recommended Disposition:   See in office today.     Recommendation: Assisted with scheduling pt for appt today. Pt is appreciative.     Routed to provider    Does the patient meet one of the following criteria for ADS visit consideration? 16+ years old, with an [facility] PCP     TIP  Providers, please consider if this condition is appropriate for management at one of our Acute and Diagnostic Services sites.     If patient is a good candidate, please use dotphrase <dot>triageresponse and select Refer to ADS to document.       1. STOOL PATTERN OR FREQUENCY: \"How often do you have a bowel movement (BM)?\"  (Normal range: 3 times a day to every 3 days)  \"When was your last BM?\"        Normally- 3x/wk   2. STRAINING: \"Do you have to strain to have a BM?\"       Yes  3. ONSET: \"When did the constipation begin?\"      Last BM yesterday   4. RECTAL PAIN: \"Does your rectum hurt when the stool comes out?\" If Yes, ask: \"Do you have hemorrhoids? How bad is the pain?\"  (Scale 1-10; or mild, moderate, severe)      No  5. BM COMPOSITION: \"Are the stools hard?\"       No  6. BLOOD ON STOOLS: \"Has there been any blood on the toilet tissue or on the surface of the BM?\" If Yes, ask: \"When was the last time?\"      No  7. CHRONIC CONSTIPATION: \"Is this a new problem for you?\"  If No, ask: \"How long have you had this problem?\" (days, weeks, months)    " "   Has had it for a long time   8. CHANGES IN DIET OR HYDRATION: \"Have there been any recent changes in your diet?\" \"How much fluids are you drinking on a daily basis?\"  \"How much have you had to drink today?\"      Working on 2L of water and trying to eat more veggies   9. MEDICINES: \"Have you been taking any new medicines?\" \"Are you taking any narcotic pain medicines?\" (e.g., Dilaudid, morphine, Percocet, Vicodin)      No  10. LAXATIVES: \"Have you been using any stool softeners, laxatives, or enemas?\"  If Yes, ask \"What, how often, and when was the last time?\"        Pt took 2 laxatives (5mg bisacodyl), miralax (last took yesterday. Pt takes it at night), pt has been taking gas x.    11. ACTIVITY:  \"How much walking do you do every day?\"  \"Has your activity level decreased in the past week?\"         Trying to walk more   12. CAUSE: \"What do you think is causing the constipation?\"         Unknown   13. MEDICAL HISTORY: \"Do you have a history of hemorrhoids, rectal fissures, rectal surgery, or rectal abscess?\"          Pt has a fissure. pt uses an ointment for itching and kind of sore   14. OTHER SYMPTOMS: \"Do you have any other symptoms?\" (e.g., abdomen pain, bloating, fever, vomiting)        Abd bloating, belching, uncomfortable,   15. PREGNANCY: \"Is there any chance you are pregnant?\" \"When was your last menstrual period?\"        NA  Reason for Disposition   Abdomen is more swollen than usual    Additional Information   Negative: Abdomen pain is main symptom and female   Negative: Rectal bleeding or blood in stool is main symptom   Negative: Abdomen bloating or swelling are main symptoms   Negative: Abdomen pain is main symptom and male   Negative: Vomiting bile (green color)   Negative: Patient sounds very sick or weak to the triager   Negative: Constant abdominal pain lasting > 2 hours   Negative: Vomiting and abdomen looks much more swollen than usual   Negative: Rectal pain or fullness from fecal impaction " (rectum full of stool) and NOT better after SITZ bath, suppository or enema    Protocols used: Constipation-A-OH

## 2025-02-14 ENCOUNTER — TELEPHONE (OUTPATIENT)
Dept: FAMILY MEDICINE | Facility: CLINIC | Age: 75
End: 2025-02-14
Payer: COMMERCIAL

## 2025-02-14 NOTE — TELEPHONE ENCOUNTER
"Received a call from the patient with an update for PCP regarding her constipation...    Patient states the past 7 days have been \"really good.\" Patient states she has had a bowel movement every day. The amount does vary but she is still having one every day. Patient states she did have some lower intestinal discomfort on Wednesday but she took a dose of Gas X and her symptoms resolved. She is eating more high fiber foods, drinking prune juice, walking 30 minutes or more each day, and she bought a stool for her legs in the bathroom so her body is in the correct position.     RN advised update would be sent to PCP. RN also advised patient to call the clinic back if her symptoms get worse before she leaves for her trip in one week (patient states she will be gone for 5 weeks). Patient expressed understanding and had no additional questions at this time.     Lizy Mason RN  "

## 2025-02-17 ENCOUNTER — MYC REFILL (OUTPATIENT)
Dept: FAMILY MEDICINE | Facility: CLINIC | Age: 75
End: 2025-02-17
Payer: COMMERCIAL

## 2025-02-17 DIAGNOSIS — K60.2 ANAL FISSURE: ICD-10-CM

## 2025-02-27 DIAGNOSIS — K21.9 GASTROESOPHAGEAL REFLUX DISEASE, UNSPECIFIED WHETHER ESOPHAGITIS PRESENT: ICD-10-CM

## 2025-02-27 DIAGNOSIS — E78.5 HYPERLIPIDEMIA LDL GOAL <130: ICD-10-CM

## 2025-02-27 RX ORDER — OMEPRAZOLE 20 MG/1
20 CAPSULE, DELAYED RELEASE ORAL 2 TIMES DAILY
Qty: 180 CAPSULE | Refills: 2 | Status: SHIPPED | OUTPATIENT
Start: 2025-02-27

## 2025-02-27 RX ORDER — ATORVASTATIN CALCIUM 20 MG/1
20 TABLET, FILM COATED ORAL DAILY
Qty: 90 TABLET | Refills: 2 | Status: SHIPPED | OUTPATIENT
Start: 2025-02-27

## 2025-03-24 ENCOUNTER — MYC MEDICAL ADVICE (OUTPATIENT)
Dept: FAMILY MEDICINE | Facility: CLINIC | Age: 75
End: 2025-03-24
Payer: COMMERCIAL

## 2025-04-16 ENCOUNTER — LAB (OUTPATIENT)
Dept: LAB | Facility: CLINIC | Age: 75
End: 2025-04-16
Payer: COMMERCIAL

## 2025-04-16 DIAGNOSIS — Z00.00 ENCOUNTER FOR MEDICARE ANNUAL WELLNESS EXAM: ICD-10-CM

## 2025-04-16 DIAGNOSIS — N18.2 CKD (CHRONIC KIDNEY DISEASE) STAGE 2, GFR 60-89 ML/MIN: ICD-10-CM

## 2025-04-16 DIAGNOSIS — K21.9 GASTROESOPHAGEAL REFLUX DISEASE, UNSPECIFIED WHETHER ESOPHAGITIS PRESENT: ICD-10-CM

## 2025-04-16 DIAGNOSIS — M81.0 AGE RELATED OSTEOPOROSIS, UNSPECIFIED PATHOLOGICAL FRACTURE PRESENCE: ICD-10-CM

## 2025-04-16 DIAGNOSIS — E78.5 HYPERLIPIDEMIA LDL GOAL <130: ICD-10-CM

## 2025-04-16 DIAGNOSIS — I10 ESSENTIAL HYPERTENSION: ICD-10-CM

## 2025-04-16 LAB
ALBUMIN SERPL BCG-MCNC: 4.6 G/DL (ref 3.5–5.2)
ALP SERPL-CCNC: 52 U/L (ref 40–150)
ALT SERPL W P-5'-P-CCNC: 22 U/L (ref 0–50)
ANION GAP SERPL CALCULATED.3IONS-SCNC: 11 MMOL/L (ref 7–15)
AST SERPL W P-5'-P-CCNC: 29 U/L (ref 0–45)
BILIRUB SERPL-MCNC: 0.4 MG/DL
BUN SERPL-MCNC: 12.1 MG/DL (ref 8–23)
CALCIUM SERPL-MCNC: 9.6 MG/DL (ref 8.8–10.4)
CHLORIDE SERPL-SCNC: 101 MMOL/L (ref 98–107)
CHOLEST SERPL-MCNC: 189 MG/DL
CREAT SERPL-MCNC: 0.85 MG/DL (ref 0.51–0.95)
EGFRCR SERPLBLD CKD-EPI 2021: 70 ML/MIN/1.73M2
ERYTHROCYTE [DISTWIDTH] IN BLOOD BY AUTOMATED COUNT: 13.5 % (ref 10–15)
FASTING STATUS PATIENT QL REPORTED: NO
FASTING STATUS PATIENT QL REPORTED: NO
GLUCOSE SERPL-MCNC: 101 MG/DL (ref 70–99)
HCO3 SERPL-SCNC: 24 MMOL/L (ref 22–29)
HCT VFR BLD AUTO: 35.7 % (ref 35–47)
HDLC SERPL-MCNC: 74 MG/DL
HGB BLD-MCNC: 12.3 G/DL (ref 11.7–15.7)
LDLC SERPL CALC-MCNC: 92 MG/DL
MCH RBC QN AUTO: 31 PG (ref 26.5–33)
MCHC RBC AUTO-ENTMCNC: 34.5 G/DL (ref 31.5–36.5)
MCV RBC AUTO: 90 FL (ref 78–100)
NONHDLC SERPL-MCNC: 115 MG/DL
PLATELET # BLD AUTO: 238 10E3/UL (ref 150–450)
POTASSIUM SERPL-SCNC: 4.3 MMOL/L (ref 3.4–5.3)
PROT SERPL-MCNC: 7.1 G/DL (ref 6.4–8.3)
RBC # BLD AUTO: 3.97 10E6/UL (ref 3.8–5.2)
SODIUM SERPL-SCNC: 136 MMOL/L (ref 135–145)
TRIGL SERPL-MCNC: 117 MG/DL
VIT D+METAB SERPL-MCNC: 49 NG/ML (ref 20–50)
WBC # BLD AUTO: 6 10E3/UL (ref 4–11)

## 2025-04-16 PROCEDURE — 80061 LIPID PANEL: CPT

## 2025-04-16 PROCEDURE — 80053 COMPREHEN METABOLIC PANEL: CPT

## 2025-04-16 PROCEDURE — 82306 VITAMIN D 25 HYDROXY: CPT

## 2025-04-16 PROCEDURE — 85027 COMPLETE CBC AUTOMATED: CPT

## 2025-04-16 PROCEDURE — 36415 COLL VENOUS BLD VENIPUNCTURE: CPT

## 2025-04-24 ENCOUNTER — THERAPY VISIT (OUTPATIENT)
Age: 75
End: 2025-04-24
Attending: INTERNAL MEDICINE
Payer: COMMERCIAL

## 2025-04-24 DIAGNOSIS — M54.50 CHRONIC LOW BACK PAIN: Primary | ICD-10-CM

## 2025-04-24 DIAGNOSIS — G89.29 CHRONIC LOW BACK PAIN: Primary | ICD-10-CM

## 2025-04-24 DIAGNOSIS — M51.361 DEGENERATION OF INTERVERTEBRAL DISC OF LUMBAR REGION WITH LOWER EXTREMITY PAIN: ICD-10-CM

## 2025-04-24 NOTE — PROGRESS NOTES
PHYSICAL THERAPY EVALUATION  Type of Visit: Evaluation       Fall Risk Screen:  Have you fallen 2 or more times in the past year?: No  Have you fallen and had an injury in the past year?: No    Subjective         Presenting condition or subjective complaint:    Date of onset: 01/02/25 (date of MD referral)    Relevant medical history:     Past Medical History:   Diagnosis Date    Allergy to cats     nasal symptoms    Arthritis 2015    x-ray    Essential hypertension 1/2/2025    Family history of ischemic heart disease     parents and brother with CAD onset age 60s    Hidradenitis     recurrent, groin    Influenza with other manifestations 1972    hospitalized    Menopause 1993    age 43    Mixed hyperlipidemia     Osteopenia 2003 2014 FRAX: 12% risk major osteoporotic fx, 2.2% risk hip fx    Status post bunionectomy 05/2012    right with hammertoe repairs       Dates & types of surgery:    Past Surgical History:   Procedure Laterality Date    BUNIONECTOMY Left 4/24/2019    Procedure: LEFT HALLIX VAGUS AND SECOND CLAWTOE RECONSTRUCTION;  Surgeon: Tyler Jameson MD;  Location:  OR    BUNIONECTOMY KATIE  5/14/2012    Procedure:BUNIONECTOMY KATIE; RIGHT FOOT EULA KATIE BUNIONECTOMY, HAMMER TOE RECONSTRUCTION DIGITS TWO, THREE, FOUR AND FIVE; Surgeon:DEANDRE MONTE; Location: [facility]    C DEXA INTERPRETATION, AXIAL  8/2003    T score femur -1.3, lumbar -1.0    C DEXA INTERPRETATION, AXIAL  8/2004    T score lumbar -1.2, femoral -1.4    C DEXA INTERPRETATION, AXIAL  11/2007    T score lumbar -1.5 (decline -2.8%/yr), femoral neck -1.3/-1.8 (decline -1.8%/yr)    COLONOSCOPY  1993    COLONOSCOPY  9/2000    normal, repeat 10 years    COLONOSCOPY  10/2010    normal, repeat 5 years    DEXA  10/2010    T score lumbar -1.2(stable), femoral neck -1.8/-2.2 with BMD 0.755 (6% worsening)    HC TOOTH EXTRACTION W/FORCEP  1973    wisdom teeth extracted    REPAIR HAMMER TOE  5/14/2012    Procedure:REPAIR HAMMER TOE;  Surgeon:DEANDRE MONTE; Location:[facility]    STRESS ECHO  10/2010    normal LV function at rest, hyperdynamic with exercise, EF 55-60% at rest and 70% post exercise    STRESS ECHO  10/2010    negative for echo/EKG changes of ischemia, normal LV fct with EF 55-60%, systolic fct hyperdynamic with exercise, exercised 7 min, EK.5 mm upsloping ST depression inf lead    STRESS ECHO ([facility]RO)  2004    negative, exercise 8-9 min    [facility] DEXA INTERPRETATION, AXIAL  2014    T score L1-4 of -1.4 (dec 2.7%), femoral neck -1.9/-2.2 with BMD 0.728 (dec 0.8%)           Prior diagnostic imaging/testing results: Bone scan     Prior therapy history for the same diagnosis, illness or injury: No      Prior Level of Function  Transfers: Independent  Ambulation: Independent  ADL: Independent  IADL:     Living Environment  Social support: With a significant other or spouse   Type of home: House; Multi-level   Stairs to enter the home: No       Ramp: No   Stairs inside the home: Yes 3 Is there a railing: Yes     Help at home: None  Equipment owned:       Employment: No    Hobbies/Interests: Reading, listenkng to music, daily walks    Patient goals for therapy:      Pain assessment:      Objective   LUMBAR SPINE EVALUATION  PAIN: Pain Level at Rest: 0/10  Pain Level with Use: 3/10  Pain Location: B low back pain that radiates into B lateral hip and can radiate up into mid back  Pain Quality: Aching  Pain Frequency: intermittent  Pain is Worst: varies depending on activity  Pain is Exacerbated By: standing and cooking or doing chores, bending  Pain is Relieved By: stop activity that is bothering it  Pain Progression: maybe worsened a little  INTEGUMENTARY (edema, incisions):   POSTURE:  increased thoracic kyphosis and increased lumbar lordosis  GAIT:   Weightbearing Status:   Assistive Device(s):   Gait Deviations:   BALANCE/PROPRIOCEPTION:   WEIGHTBEARING ALIGNMENT:   NON-WEIGHTBEARING ALIGNMENT:    ROM:   (Degrees) Left AROM Left  PROM  Right AROM Right PROM   Hip Flexion       Hip Extension       Hip Abduction       Hip Adduction       Hip Internal Rotation       Hip External Rotation       Knee Flexion       Knee Extension       Lumbar Side glide WNL (slight pain at L lateral hip) WNL (slight pain at R lateral hip)   Lumbar Flexion WNL   Lumbar Extension WNL   Pain:   End feel:   PELVIC/SI SCREEN:   STRENGTH: 4/5 TA strength    MYOTOMES:   DTR S:   CORD SIGNS:   DERMATOMES:   NEURAL TENSION:   FLEXIBILITY:   LUMBAR/HIP Special Tests:    PELVIS/SI SPECIAL TESTS:   FUNCTIONAL TESTS:   PALPATION:   SPINAL SEGMENTAL CONCLUSIONS:       Assessment & Plan   CLINICAL IMPRESSIONS  Medical Diagnosis: Degeneration of intervertebral disc of lumbar region with lower extremity pain    Treatment Diagnosis: chronic low back pain   Impression/Assessment: Patient is a 75 year old female with LBP complaints.  The following significant findings have been identified: Pain, Decreased ROM/flexibility, Impaired muscle performance, Decreased activity tolerance, and Impaired posture. These impairments interfere with their ability to perform household chores, driving , community mobility, and gardening  as compared to previous level of function.     Clinical Decision Making (Complexity):  Clinical Presentation: Stable/Uncomplicated  Clinical Presentation Rationale: based on medical and personal factors listed in PT evaluation  Clinical Decision Making (Complexity): Low complexity    PLAN OF CARE  Treatment Interventions:  Interventions: Neuromuscular Re-education, Therapeutic Activity, Therapeutic Exercise    Long Term Goals     PT Goal 1  Goal Identifier: cooking/preparing food  Goal Description: Pt will be able to stand to prepare food for 30 mn with 0-3/10 concordant LBP.  Rationale: to maximize safety and independence with performance of ADLs and functional tasks  Target Date: 06/19/25      Frequency of Treatment: 1x/week  Duration of Treatment: 8  weeks    Recommended Referrals to Other Professionals:   Education Assessment:   Learner/Method: No Barriers to Learning    Risks and benefits of evaluation/treatment have been explained.   Patient/Family/caregiver agrees with Plan of Care.     Evaluation Time:     PT Eval, Low Complexity Minutes (69054): 15       Signing Clinician: KADIE King [facility]                                                                                   OUTPATIENT PHYSICAL THERAPY      PLAN OF TREATMENT FOR OUTPATIENT REHABILITATION   Patient's Last Name, First Name, Dorie Patel  CONNIE YOB: 1950   Provider's Name   [facility]   Medical Record No.  [id number]     Onset Date: 01/02/25 (date of MD referral)  Start of Care Date: 04/24/25     Medical Diagnosis:  Degeneration of intervertebral disc of lumbar region with lower extremity pain      PT Treatment Diagnosis:  chronic low back pain Plan of Treatment  Frequency/Duration: 1x/week/ 8 weeks    Certification date from 04/24/25 to 06/19/25         See note for plan of treatment details and functional goals     Sujatha James PT                         I CERTIFY THE NEED FOR THESE SERVICES FURNISHED UNDER        THIS PLAN OF TREATMENT AND WHILE UNDER MY CARE     (Physician attestation of this document indicates review and certification of the therapy plan).              Referring Provider:  Lori Escamilla    Initial Assessment  See Epic Evaluation- Start of Care Date: 04/24/25

## 2025-05-01 ENCOUNTER — THERAPY VISIT (OUTPATIENT)
Age: 75
End: 2025-05-01
Attending: INTERNAL MEDICINE
Payer: COMMERCIAL

## 2025-05-01 DIAGNOSIS — M51.361 DEGENERATION OF INTERVERTEBRAL DISC OF LUMBAR REGION WITH LOWER EXTREMITY PAIN: Primary | ICD-10-CM

## 2025-05-01 DIAGNOSIS — M54.50 CHRONIC LOW BACK PAIN: ICD-10-CM

## 2025-05-01 DIAGNOSIS — G89.29 CHRONIC LOW BACK PAIN: ICD-10-CM

## 2025-05-11 DIAGNOSIS — M81.0 AGE RELATED OSTEOPOROSIS, UNSPECIFIED PATHOLOGICAL FRACTURE PRESENCE: ICD-10-CM

## 2025-05-12 RX ORDER — ALENDRONATE SODIUM 70 MG/1
70 TABLET ORAL
Qty: 12 TABLET | Refills: 2 | Status: SHIPPED | OUTPATIENT
Start: 2025-05-12

## 2025-05-15 ENCOUNTER — THERAPY VISIT (OUTPATIENT)
Age: 75
End: 2025-05-15
Payer: COMMERCIAL

## 2025-05-15 DIAGNOSIS — M54.50 CHRONIC LOW BACK PAIN: ICD-10-CM

## 2025-05-15 DIAGNOSIS — M51.361 DEGENERATION OF INTERVERTEBRAL DISC OF LUMBAR REGION WITH LOWER EXTREMITY PAIN: Primary | ICD-10-CM

## 2025-05-15 DIAGNOSIS — G89.29 CHRONIC LOW BACK PAIN: ICD-10-CM

## 2025-05-15 NOTE — PROGRESS NOTES
"    DISCHARGE  Reason for Discharge: Patient has met all goals.    Equipment Issued: none    Discharge Plan: Patient to continue home program.     05/15/25 0500   Appointment Info   Signing clinician's name / credentials Sujatha James DPT, Cert. MDT   Total/Authorized Visits 8 (E&T)   Visits Used 3   Medical Diagnosis Degeneration of intervertebral disc of lumbar region with lower extremity pain   PT Tx Diagnosis chronic low back pain   Progress Note/Certification   Start of Care Date 04/24/25   Onset of illness/injury or Date of Surgery 01/02/25  (date of MD referral)   Therapy Frequency 1x/week   Predicted Duration 8 weeks   Certification date from 04/24/25   Certification date to 06/19/25   Progress Note Completed Date 04/24/25   PT Goal 1   Goal Identifier cooking/preparing food   Goal Description Pt will be able to stand to prepare food for 30 mn with 0-3/10 concordant LBP.   Rationale to maximize safety and independence with performance of ADLs and functional tasks   Goal Progress has done a little of this and it has been going \"alright\"   Target Date 06/19/25   Subjective Report   Subjective Report Pt reports that her back has been doing pretty good.  She had a little achiness yesterday after doing exercise.   Objective Measures   Objective Measures Objective Measure 1;Objective Measure 2   Objective Measure 1   Objective Measure Pain   Details no pain currently   Objective Measure 2   Objective Measure lumbar AROM   Details flex WNL (ERP on L side), ext WNL (ERP on L side), B SG WNL (ERP on L side)   Treatment Interventions (PT)   Interventions Therapeutic Procedure/Exercise;Neuromuscular Re-education   Therapeutic Procedure/Exercise   Therapeutic Procedures: strength, endurance, ROM, flexibility minutes (88395) 23   Therapeutic Procedures Ther Proc 3;Ther Proc 2;Ther Proc 4   Ther Proc 1 supine abd #6, #7   Ther Proc 1 - Details rev - knees bent, arms overhead x 15 reps, progressed to #7 x 20 reps - " fatigue - HEP   Ther Proc 2 supine single knee to chest   Ther Proc 2 - Details rev x 8-10 reps, each LE - no pain - decreased L-sided LBP with lumbar flex, ext and B SG after - discussed doing this stretch as needed (2-3x/day) - so before going for a walk, before standing a cooking for a while, maybe after a long day of doing lots of activities.   Ther Proc 3 Pt Ed   Ther Proc 3 - Details Educated pt on doing HEP 3-4x/week and stretches daily at least once/day and more as needed.  Spent time combining hip and low back exercises together for her - the ones to continue.   Ther Proc 4 wall sit   Ther Proc 4 - Details rev x 20 reps - for feet apart and not allowing knees to go past ankles - no pain, just fatigue - HEP 3x/week   Skilled Intervention VC for form/technique   Patient Response/Progress no pain   Neuromuscular Re-education   Neuromuscular re-ed of mvmt, balance, coord, kinesthetic sense, posture, proprioception minutes (13165) 15   Neuromuscular Re-education Neuro Re-ed 2;Neuro Re-ed 3;Neuro Re-ed 4;Neuro Re-ed 5   Neuro Re-ed 1 rows   Neuro Re-ed 1 - Details rev - w/RTB x 30 reps-  VC for TA activation to keep NS and scap depressio - HEP   Neuro Re-ed 2 pulldowns   Neuro Re-ed 2 - Details rev - w/RTB x 30 reps - VC for TA activation to keep NS and scap depressio - HEP   Neuro Re-ed 4 prone abd #2   Neuro Re-ed 4 - Details rev w/pillow under hips x 20 reps - fatigue - VC/MC to move arms more into scapular position if straight overhead is too hard on shoulders - HEP   Neuro Re-ed 5 prone #3   Neuro Re-ed 5 - Details rev - w/pillow under hips x 30 reps - small lift of legs - felt back muscles working - VC for TA activation - no pain - HEP - discussed stopping if she gets increase in low back pain with this.   Skilled Intervention VC for form/technique   Patient Response/Progress no pain   Education   Learner/Method No Barriers to Learning   Plan   Home program gave pt handout of HEP   Updates to plan of care  d/c   Plan for next session d/c   Total Session Time   Timed Code Treatment Minutes 38   Total Treatment Time (sum of timed and untimed services) 38         Referring Provider:  Lori Escamilla

## 2025-05-16 ENCOUNTER — PREP FOR PROCEDURE (OUTPATIENT)
Dept: SURGERY | Facility: CLINIC | Age: 75
End: 2025-05-16
Payer: COMMERCIAL

## 2025-05-21 ENCOUNTER — OFFICE VISIT (OUTPATIENT)
Dept: FAMILY MEDICINE | Facility: CLINIC | Age: 75
End: 2025-05-21
Payer: COMMERCIAL

## 2025-05-21 ENCOUNTER — TRANSFERRED RECORDS (OUTPATIENT)
Dept: HEALTH INFORMATION MANAGEMENT | Facility: CLINIC | Age: 75
End: 2025-05-21

## 2025-05-21 VITALS
DIASTOLIC BLOOD PRESSURE: 74 MMHG | SYSTOLIC BLOOD PRESSURE: 124 MMHG | TEMPERATURE: 96.9 F | HEART RATE: 63 BPM | HEIGHT: 60 IN | OXYGEN SATURATION: 96 % | RESPIRATION RATE: 10 BRPM | WEIGHT: 133.3 LBS | BODY MASS INDEX: 26.17 KG/M2

## 2025-05-21 DIAGNOSIS — I10 ESSENTIAL HYPERTENSION: ICD-10-CM

## 2025-05-21 DIAGNOSIS — K40.90 RIGHT INGUINAL HERNIA: ICD-10-CM

## 2025-05-21 DIAGNOSIS — J45.909 BRONCHITIS, ALLERGIC, UNSPECIFIED ASTHMA SEVERITY, UNCOMPLICATED: ICD-10-CM

## 2025-05-21 DIAGNOSIS — M85.80 OSTEOPENIA, UNSPECIFIED LOCATION: Chronic | ICD-10-CM

## 2025-05-21 DIAGNOSIS — Z01.818 PREOPERATIVE CLEARANCE: Primary | ICD-10-CM

## 2025-05-21 DIAGNOSIS — I49.3 PVC'S (PREMATURE VENTRICULAR CONTRACTIONS): ICD-10-CM

## 2025-05-21 LAB
ANION GAP SERPL CALCULATED.3IONS-SCNC: 11 MMOL/L (ref 7–15)
BUN SERPL-MCNC: 14.4 MG/DL (ref 8–23)
CALCIUM SERPL-MCNC: 9.2 MG/DL (ref 8.8–10.4)
CHLORIDE SERPL-SCNC: 103 MMOL/L (ref 98–107)
CREAT SERPL-MCNC: 0.8 MG/DL (ref 0.51–0.95)
EGFRCR SERPLBLD CKD-EPI 2021: 75 ML/MIN/1.73M2
ERYTHROCYTE [DISTWIDTH] IN BLOOD BY AUTOMATED COUNT: 13.5 % (ref 10–15)
GLUCOSE SERPL-MCNC: 87 MG/DL (ref 70–99)
HCO3 SERPL-SCNC: 24 MMOL/L (ref 22–29)
HCT VFR BLD AUTO: 34.2 % (ref 35–47)
HGB BLD-MCNC: 11.7 G/DL (ref 11.7–15.7)
MAGNESIUM SERPL-MCNC: 2.1 MG/DL (ref 1.7–2.3)
MCH RBC QN AUTO: 30.9 PG (ref 26.5–33)
MCHC RBC AUTO-ENTMCNC: 34.2 G/DL (ref 31.5–36.5)
MCV RBC AUTO: 90 FL (ref 78–100)
PLATELET # BLD AUTO: 233 10E3/UL (ref 150–450)
POTASSIUM SERPL-SCNC: 4 MMOL/L (ref 3.4–5.3)
RBC # BLD AUTO: 3.79 10E6/UL (ref 3.8–5.2)
SODIUM SERPL-SCNC: 138 MMOL/L (ref 135–145)
WBC # BLD AUTO: 5 10E3/UL (ref 4–11)

## 2025-05-21 PROCEDURE — 85027 COMPLETE CBC AUTOMATED: CPT | Performed by: INTERNAL MEDICINE

## 2025-05-21 PROCEDURE — 3074F SYST BP LT 130 MM HG: CPT | Performed by: INTERNAL MEDICINE

## 2025-05-21 PROCEDURE — 99214 OFFICE O/P EST MOD 30 MIN: CPT | Performed by: INTERNAL MEDICINE

## 2025-05-21 PROCEDURE — 3078F DIAST BP <80 MM HG: CPT | Performed by: INTERNAL MEDICINE

## 2025-05-21 PROCEDURE — 1126F AMNT PAIN NOTED NONE PRSNT: CPT | Performed by: INTERNAL MEDICINE

## 2025-05-21 PROCEDURE — 83735 ASSAY OF MAGNESIUM: CPT | Performed by: INTERNAL MEDICINE

## 2025-05-21 PROCEDURE — G2211 COMPLEX E/M VISIT ADD ON: HCPCS | Performed by: INTERNAL MEDICINE

## 2025-05-21 PROCEDURE — 80048 BASIC METABOLIC PNL TOTAL CA: CPT | Performed by: INTERNAL MEDICINE

## 2025-05-21 PROCEDURE — 93000 ELECTROCARDIOGRAM COMPLETE: CPT | Performed by: INTERNAL MEDICINE

## 2025-05-21 PROCEDURE — 36415 COLL VENOUS BLD VENIPUNCTURE: CPT | Performed by: INTERNAL MEDICINE

## 2025-05-21 ASSESSMENT — PAIN SCALES - GENERAL: PAINLEVEL_OUTOF10: NO PAIN (0)

## 2025-05-21 NOTE — H&P (VIEW-ONLY)
Preoperative Evaluation  [address] [address]  Phone: [phone]  Primary Provider: Lori Escamilla MD  Pre-op Performing Provider: Lori Escamilla MD  May 21, 2025             5/19/2025   Surgical Information   What procedure is being done? Hernia sirgery   Facility or Hospital where procedure/surgery will be performed: [facility]   Who is doing the procedure / surgery? Dr. Wyatt Washington   Date of surgery / procedure: 06/09/2025   Time of surgery / procedure: 12:15PM    Where do you plan to recover after surgery? at home with family     Fax number for surgical facility: [phone]    Assessment & Plan     The proposed surgical procedure is considered INTERMEDIATE risk.    Assessment and Plan  1. Preoperative clearance (Primary)  2. Right inguinal hernia    Patient is here for preoperative clearance of robot assisted laparoscopic right inguinal hernia repair possible bilateral under General anaesthesia for diagnosis of Right inguinal hernia.     Does have medical conditions of LVOT, dyslipidemia, hypertension with medications as managed by PCP.  Also following cardiology but no acute cardiopulmonary symptoms at this time.     - EKG 12-lead complete w/read - Clinics  - Basic metabolic panel  (Ca, Cl, CO2, Creat, Gluc, K, Na, BUN); Future  - CBC with platelets; Future      3. Essential hypertension  Well-controlled with current atenolol, okay to continue.    4. Bronchitis, allergic, unspecified asthma severity, uncomplicated  Chronic stable, no flareup at this time.  Continue albuterol as needed without the precautions given in the preop risks & recommendations.    5. Osteopenia, unspecified location  Stable, holding parameters given.    6. PVC's (premature ventricular contractions)  - Magnesium; Future       The longitudinal plan of care for the diagnosis(es)/condition(s) as documented were addressed during this visit. Due to  the added complexity in care, I will continue to support Dorie in the subsequent management and with ongoing continuity of care.      Please note that this note consists of symbols derived from keyboarding, dictation and/or voice recognition software. As a result, there may be errors in the script that have gone undetected. Please consider this when interpreting information found in this chart.    Patient Instructions   As discussed , please do pertinent work up needed for this clearance    =================      How to Take Your Medication Before Surgery  Preoperative Medication Instructions  Antiplatelet or Anticoagulation Medication Instructions   - We reviewed the medication list and the patient is not on an antiplatelet or anticoagulation medications.    Additional Medication Instructions  Take all scheduled medications on the day of surgery EXCEPT for modifications listed below:   - Herbal medications and vitamins: DO NOT TAKE 14 days prior to surgery.   - Beta Blockers (atenolol, metoprolol, propranolol) : Continue taking on the day of surgery.   - bisphosphonates (alendronate, ibandronate, risedronate): OK to hold on the day of surgery   - ibuprofen (Advil, Motrin): DO NOT TAKE 1 day before surgery.    - rescue Inhaler: Continue PRN. Bring to hospital on the day of surgery.       Patient Education  Preparing for Your Surgery  For Adults  Getting started  In most cases, a nurse will call to review your health history and instructions. They will give you an arrival time based on your scheduled surgery time. Please be ready to share:  Your doctor's clinic name and phone number  Your medical, surgical, and anesthesia history  A list of allergies and sensitivities  A list of medicines, including herbal treatments and over-the-counter drugs  Whether the patient has a legal guardian (ask how to send us the papers in advance)  Note: You may not receive a call if you were seen at our PAC (Preoperative Assessment  Center).  Please tell us if you're pregnant--or if there's any chance you might be pregnant. Some surgeries may injure a fetus (unborn baby), so they require a pregnancy test. Surgeries that are safe for a fetus don't always need a test, and you can choose whether to have one.   Preparing for surgery  Within 10 to 30 days of surgery: Have a pre-op exam (sometimes called an H&P, or History and Physical). This can be done at a clinic or pre-operative center.  If you're having a , you may not need this exam. Talk to your care team.  At your pre-op exam, talk to your care team about all medicines you take. (This includes CBD oil and any drugs, such as THC, marijuana, and other forms of cannabis.) If you need to stop any medicine before surgery, ask when to start taking it again.  This is for your safety. Many medicines and drugs can make you bleed too much during surgery. Some change how well surgery (anesthesia) drugs work.  Call your insurance company to let them know you're having surgery. (If you don't have insurance, call [phone].)  Call your clinic if there's any change in your health. This includes a scrape or scratch near the surgery site, or any signs of a cold (sore throat, runny nose, cough, rash, fever).  Eating and drinking guidelines  For your safety: Unless your surgeon tells you otherwise, follow the guidelines below.  Eat and drink as normal until 8 hours before you arrive for surgery. After that, no food or milk. You can spit out gum when you arrive.  Drink clear liquids until 2 hours before you arrive. These are liquids you can see through, like water, Gatorade, and Propel Water. They also include plain black coffee and tea (no cream or milk).  No alcohol for 24 hours before you arrive. The night before surgery, stop any drinks that contain THC.  If your care team tells you to take medicine on the morning of surgery, it's okay to take it with a sip of water. No other medicines or drugs  are allowed (including CBD oil)--follow your care team's instructions.  If you have questions the day of surgery, call your hospital or surgery center.   Preventing infection  Shower or bathe the night before and the morning of surgery. Follow the instructions your clinic gave you. (If no instructions, use regular soap.)  Don't shave or clip hair near your surgery site. We'll remove the hair if needed.  Don't smoke or vape the morning of surgery. No chewing tobacco for 6 hours before you arrive. A nicotine patch is okay. You may spit out nicotine gum when you arrive.  For some surgeries, the surgeon will tell you to fully quit smoking and nicotine.  We will make every effort to keep you safe from infection. We will:  Clean our hands often with soap and water (or an alcohol-based hand rub).  Clean the skin at your surgery site with a special soap that kills germs.  Give you a special gown to keep you warm. (Cold raises the risk of infection.)  Wear hair covers, masks, gowns, and gloves during surgery.  Give antibiotic medicine, if prescribed. Not all surgeries need this medicine.  What to bring on the day of surgery  Photo ID and insurance card  Copy of your health care directive, if you have one  Glasses and hearing aids (bring cases)  You can't wear contacts during surgery  Inhaler and eye drops, if you use them (tell us about these when you arrive)  CPAP machine or breathing device, if you use them  A few personal items, if spending the night  If you have . . .  A pacemaker, ICD (cardiac defibrillator), or other implant: Bring the ID card.  An implanted stimulator: Bring the remote control.  A legal guardian: Bring a copy of the certified (court-stamped) guardianship papers.  Please remove any jewelry, including body piercings. Leave jewelry and other valuables at home.  If you're going home the day of surgery  You must have a responsible adult drive you home. They should stay with you overnight as well.  If you  don't have someone to stay with you, and you aren't safe to go home alone, we may keep you overnight. Insurance often won't pay for this.  After surgery  If it's hard to control your pain or you need more pain medicine, please call your surgeon's office.  Questions?   If you have any questions for your care team, list them here:   ____________________________________________________________________________________________________________________________________________________________________________________________________________________________________________________________  For informational purposes only. Not to replace the advice of your health care provider. Copyright   2003, 2019 [facility]. All rights reserved. Clinically reviewed by Corona Meng MD. [employer] [id number] - REV 08/24.     Return in about 5 months (around 10/21/2025), or if symptoms worsen or fail to improve, for Follow up of last visit, If symptoms persist, video visit.    Lori Escamilla MD  [facility]        Risks and Recommendations  The patient has the following additional risks and recommendations for perioperative complications:   - Consult Hospitalist / IM to assist with post-op medical management  Cardiovascular:   - pt was following cardiology in the past for possible LVOT on Echocardiogram but no acute concerns as per review of last visit with them except there was a repeat Echo ordered for 6/2025.   Pulmonary:    - Incentive spirometry post-op   - Consider Respiratory Therapy (Respiratory Care IP Consult) post-op   - On and off seasonal flareup of chronic bronchitis cough - On Albuterol inhaler.     Preoperative Medication Instructions  Antiplatelet or Anticoagulation Medication Instructions   - We reviewed the medication list and the patient is not on an antiplatelet or anticoagulation medications.    Additional Medication Instructions  Take all scheduled medications on the day of  surgery EXCEPT for modifications listed below:   - Herbal medications and vitamins: DO NOT TAKE 14 days prior to surgery.   - Beta Blockers (atenolol, metoprolol, propranolol) : Continue taking on the day of surgery.   - bisphosphonates (alendronate, ibandronate, risedronate): OK to hold on the day of surgery   - ibuprofen (Advil, Motrin): DO NOT TAKE 1 day before surgery.    - rescue Inhaler: Continue PRN. Bring to hospital on the day of surgery.    Recommendation  Approval given to proceed with proposed procedure, without further diagnostic evaluation.      Howard Oshea is a 75 year old, presenting for the following:  Pre-Op Exam (robot assisted laparoscopic right inguinal hernia repair possible bilateral )          5/21/2025     8:50 AM   Additional Questions   Roomed by Jade MONREAL         5/21/2025   Forms   Any forms needing to be completed Yes     HPI:       Last seen patient in April 2025 for annual physical at that time, she is here for preoperative clearance.        5/19/2025   Pre-Op Questionnaire   Have you ever had a heart attack or stroke? No   Have you ever had surgery on your heart or blood vessels, such as a stent placement, a coronary artery bypass, or surgery on an artery in your head, neck, heart, or legs? No   Do you have chest pain with activity? No   Do you have a history of heart failure? No   Do you currently have a cold, bronchitis or symptoms of other infection? No   Do you have a cough, shortness of breath, or wheezing? (!) YES    Do you or anyone in your family have previous history of blood clots? (!) YES    Do you or does anyone in your family have a serious bleeding problem such as prolonged bleeding following surgeries or cuts? (!) UNKNOWN    Have you ever had problems with anemia or been told to take iron pills? No   Have you had any abnormal blood loss such as black, tarry or bloody stools, or abnormal vaginal bleeding? No   Have you ever had a blood transfusion? No   Are you  willing to have a blood transfusion if it is medically needed before, during, or after your surgery? Yes   Have you or any of your relatives ever had problems with anesthesia? No   Do you have sleep apnea, excessive snoring or daytime drowsiness? No   Do you have any artifical heart valves or other implanted medical devices like a pacemaker, defibrillator, or continuous glucose monitor? No   Do you have artificial joints? No   Are you allergic to latex? No     Advance Care Planning    Discussed advance care planning with patient; however, patient declined at this time.    Preoperative Review of    reviewed - no record of controlled substances prescribed.      Status of Chronic Conditions:  See problem list for active medical problems.  Problems all longstanding and stable, except as noted/documented.  See ROS for pertinent symptoms related to these conditions.    Patient Active Problem List    Diagnosis Date Noted    Degeneration of intervertebral disc of lumbar region with lower extremity pain 04/24/2025     Priority: Medium    Chronic low back pain 04/24/2025     Priority: Medium    Essential hypertension 01/02/2025     Priority: Medium    Bilateral hip pain 09/19/2024     Priority: Medium    Paresthesia of lower extremity 09/19/2024     Priority: Medium    Primary osteoarthritis of both hips 09/19/2024     Priority: Medium    Arthritis of sacroiliac joint 09/19/2024     Priority: Medium    Bronchitis 03/14/2024     Priority: Medium    Closed fracture of right ankle with routine healing S/P ORIF at [facility] in 12/2023 02/27/2024     Priority: Medium    Hip pain, bilateral 02/17/2023     Priority: Medium    Degenerative joint disease of sacroiliac joint 02/17/2023     Priority: Medium    Hip pain, right 02/02/2023     Priority: Medium    H. pylori infection 03/06/2021     Priority: Medium     positive stool test - treated and improved 02/ 2021       Pain of right hand 01/16/2021     Priority: Medium     mostly  thumb and index finger       Numbness and tingling in right hand 01/16/2021     Priority: Medium     Likely carpel tunnel / tendonitis - improved with using splint      Seasonal allergic rhinitis due to other allergic trigger 01/16/2021     Priority: Medium    Chronic cough 01/14/2021     Priority: Medium     mostly to clear her throat , possible gerd / or allergy related       Gastroesophageal reflux disease, unspecified whether esophagitis present 01/14/2021     Priority: Medium    Hallux valgus of left foot 04/24/2019     Priority: Medium    PVC's (premature ventricular contractions) 12/16/2018     Priority: Medium    Palpitations 12/08/2017     Priority: Medium    Family history of polyps in the colon 06/25/2015     Priority: Medium     BROTHER AND SISTER.       Hyperlipidemia LDL goal <130 10/31/2010     Priority: Medium    Family history of ischemic heart disease      Priority: Medium     parents and bothers with CAD onset age 60s      Osteopenia      Priority: Medium    Hidradenitis      Priority: Medium     recurrent, groin        Past Medical History:   Diagnosis Date    Allergy to cats     nasal symptoms    Arthritis 2015    x-ray    Essential hypertension 01/02/2025    Family history of ischemic heart disease     parents and brother with CAD onset age 60s    Heart disease 2024    Hidradenitis     recurrent, groin    Influenza with other manifestations 1972    hospitalized    Menopause 1993    age 43    Mixed hyperlipidemia     Osteopenia 2003 2014 FRAX: 12% risk major osteoporotic fx, 2.2% risk hip fx    Status post bunionectomy 05/2012    right with hammertoe repairs     Past Surgical History:   Procedure Laterality Date    BIOPSY  12/29/22    BUNIONECTOMY Left 04/24/2019    Procedure: LEFT HALLIX VAGUS AND SECOND CLAWTOE RECONSTRUCTION;  Surgeon: Tyler Jameson MD;  Location:  OR    BUNIONECTOMY [place]  05/14/2012    Procedure:BUNIONECTOMY KATIE; RIGHT FOOT EULA KATIE BUNIONECTOMY, HAMMER TOE  RECONSTRUCTION DIGITS TWO, THREE, FOUR AND FIVE; Surgeon:DEANDRE MONTE; Location: [facility]    C DEXA INTERPRETATION, AXIAL  2003    T score femur -1.3, lumbar -1.0    C DEXA INTERPRETATION, AXIAL  2004    T score lumbar -1.2, femoral -1.4    C DEXA INTERPRETATION, AXIAL  2007    T score lumbar -1.5 (decline -2.8%/yr), femoral neck -1.3/-1.8 (decline -1.8%/yr)    COLONOSCOPY      COLONOSCOPY  2000    normal, repeat 10 years    COLONOSCOPY  10/2010    normal, repeat 5 years    DEXA  10/2010    T score lumbar -1.2(stable), femoral neck -1.8/-2.2 with BMD 0.755 (6% worsening)    HC TOOTH EXTRACTION W/FORCEP      wisdom teeth extracted    REPAIR HAMMER TOE  2012    Procedure:REPAIR HAMMER TOE; Surgeon:DEANDRE MONTE; Location: [facility]    STRESS ECHO  10/2010    normal LV function at rest, hyperdynamic with exercise, EF 55-60% at rest and 70% post exercise    STRESS ECHO  10/2010    negative for echo/EKG changes of ischemia, normal LV fct with EF 55-60%, systolic fct hyperdynamic with exercise, exercised 7 min, EK.5 mm upsloping ST depression inf lead    STRESS ECHO (METRO)  2004    negative, exercise 8-9 min    ZC DEXA INTERPRETATION, AXIAL  2014    T score L1-4 of -1.4 (dec 2.7%), femoral neck -1.9/-2.2 with BMD 0.728 (dec 0.8%)     Current Outpatient Medications   Medication Sig Dispense Refill    albuterol (PROAIR HFA/PROVENTIL HFA/VENTOLIN HFA) 108 (90 Base) MCG/ACT inhaler INHALE 2 PUFFS INTO THE LUNGS EVERY 6 HOURS AS NEEDED FOR SHORTNESS OF BREATH, WHEEZING OR COUGH 6.7 g 1    alendronate (FOSAMAX) 70 MG tablet TAKE 1 TABLET BY MOUTH EVERY 7 DAYS 12 tablet 2    atenolol (TENORMIN) 25 MG tablet Take 1 tablet (25 mg) by mouth daily. 90 tablet 3    atorvastatin (LIPITOR) 20 MG tablet TAKE 1 TABLET BY MOUTH EVERY DAY 90 tablet 2    carboxymethylcellulose PF (REFRESH PLUS) 0.5 % SOLN ophthalmic solution Place 1-2 drops into both eyes At Bedtime      docusate sodium (COLACE) 100 MG  capsule Take 1 capsule (100 mg) by mouth 2 times daily as needed for constipation. 100 capsule 12    melatonin 1 MG TABS Take 2 mg by mouth At Bedtime       Multiple Vitamins-Minerals (PRESERVISION AREDS 2) CHEW Take by mouth 2 times daily      nifedipine 0.2% in white petrolatum 0.2 % OINT ointment Place rectally 2 times daily. 100 g 0    omeprazole (PRILOSEC) 20 MG DR capsule TAKE 1 CAPSULE BY MOUTH TWICE A  capsule 2    polyethylene glycol 0.4%- propylene glycol 0.3% (SYSTANE ULTRA) 0.4-0.3 % SOLN ophthalmic solution Place 1-2 drops into both eyes daily as needed for dry eyes      Probiotic Product (PROBIOTIC-10) CAPS Take 1 capsule by mouth daily      Psyllium (METAMUCIL) 0.36 g CAPS Take 1 capsule by mouth daily as needed (for constipation). Metamucil powder daily 30 capsule 1    tiZANidine (ZANAFLEX) 2 MG tablet Take 1 tablet at bedtime as needed 30 tablet 0    nifedipine 0.2% in white petrolatum 0.2 % OINT ointment APPLY TOPICALLY TWICE DAILY AS DIRECTED      tiZANidine (ZANAFLEX) 2 MG tablet Take 1 tablet (2 mg) by mouth nightly as needed for muscle spasms. 30 tablet 0    triamcinolone (KENALOG) 0.1 % external cream Apply topically 2 times daily. 45 g 0       No Known Allergies     Social History     Tobacco Use    Smoking status: Never    Smokeless tobacco: Never   Substance Use Topics    Alcohol use: Yes     Comment: Wine occasionally     Family History   Problem Relation Age of Onset    C.A.D. Father         MI age 61,   age 76 from MI    Diabetes Father     Cancer Father         esophagus, former smoker    Obesity Father     Coronary Artery Disease Father             Other Cancer Father     C.A.D. Mother         MI age 62    Lipids Mother     Blood Disease Mother         pernicious anemia    Thyroid Disease Mother     Eye Disorder Mother         macular degeneration    Osteoporosis Mother     Connective Tissue Disorder Mother         SLE - remission    Hypertension Mother      "Hyperlipidemia Mother     Asthma Mother     Dementia Mother         memory loss, onset early 90s    Coronary Artery Disease Mother             C.A.D. Maternal Grandmother          early 80s    C.A.D. Maternal Grandfather          at younger age    C.A.D. Paternal Grandmother          late 70s    Hypertension Brother     Prostate Cancer Brother     Thyroid Disease Brother     Colon Polyps Brother         sister may also have colon polyps    Coronary Artery Disease Brother         angioplasty    Asthma Brother     Obesity Brother         No longer obese    Arrhythmia Brother     Hyperlipidemia Sister     Osteoporosis Sister         she has osteopenia    Colon Polyps Sister         possible    Breast Cancer Sister     Coronary Artery Disease Brother         angioplasty    Hypertension Brother     Asthma Brother     Obesity Brother     Hyperlipidemia Sister     Osteoporosis Sister     Hypertension Brother     Prostate Cancer Brother      History   Drug Use Unknown             Review of Systems  Constitutional, HEENT, cardiovascular, pulmonary, GI, , musculoskeletal, neuro, skin, endocrine and psych systems are negative, except as otherwise noted.    Objective    /74   Pulse 63   Temp 96.9  F (36.1  C) (Temporal)   Resp 10   Ht 1.53 m (5' 0.24\")   Wt 60.5 kg (133 lb 4.8 oz)   SpO2 96%   BMI 25.83 kg/m     Estimated body mass index is 25.83 kg/m  as calculated from the following:    Height as of this encounter: 1.53 m (5' 0.24\").    Weight as of this encounter: 60.5 kg (133 lb 4.8 oz).  Physical Exam  GENERAL: alert and no distress  EYES: Eyes grossly normal to inspection, PERRL and conjunctivae and sclerae normal  HENT: ear canals and TM's normal, nose and mouth without ulcers or lesions  NECK: no adenopathy, no asymmetry, masses, or scars  RESP: lungs clear to auscultation - no rales, rhonchi or wheezes  CV: regular rate and rhythm, normal S1 S2, no S3 or S4, no murmur, click or rub, " no peripheral edema  ABDOMEN: soft, nontender, no hepatosplenomegaly, no masses and bowel sounds normal  MS: no gross musculoskeletal defects noted, no edema  SKIN: no suspicious lesions or rashes  NEURO: Normal strength and tone, mentation intact and speech normal  PSYCH: mentation appears normal, affect normal/bright    Recent Labs   Lab Test 04/16/25  1031 12/19/24  2154   HGB 12.3 11.5*    271    137   POTASSIUM 4.3 4.1   CR 0.85 0.97*        Diagnostics  Labs pending at this time.  Results will be reviewed when available.  No results found for this or any previous visit (from the past 720 hours).   EKG required for structural heart disease and Possible LVOT asymptomatic , Echo with normal LVEF , intermediate risk surgery , general anaesthesia.  and not completed in the last 90 days.   EKG: sinus bradycardia, normal axis, normal intervals, no acute ST/T changes c/w ischemia, no LVH by voltage criteria, Right Bundle Branch Block, unchanged from previous tracings    Revised Cardiac Risk Index (RCRI)  The patient has the following serious cardiovascular risks for perioperative complications:   - No serious cardiac risks = 0 points     RCRI Interpretation: 0 points: Class I (very low risk - 0.4% complication rate)         Signed Electronically by: Lori Escamilla MD  A copy of this evaluation report is provided to the requesting physician.

## 2025-05-21 NOTE — PROGRESS NOTES
Preoperative Evaluation  [address] [address]  Phone: [phone]  Primary Provider: Lori Escamilla MD  Pre-op Performing Provider: Lori Escamilla MD  May 21, 2025             5/19/2025   Surgical Information   What procedure is being done? Hernia sirgery   Facility or Hospital where procedure/surgery will be performed: [facility]   Who is doing the procedure / surgery? Dr. Wyatt Washington   Date of surgery / procedure: 06/09/2025   Time of surgery / procedure: 12:15PM    Where do you plan to recover after surgery? at home with family     Fax number for surgical facility: [phone]    Assessment & Plan     The proposed surgical procedure is considered INTERMEDIATE risk.    Assessment and Plan  1. Preoperative clearance (Primary)  2. Right inguinal hernia    Patient is here for preoperative clearance of robot assisted laparoscopic right inguinal hernia repair possible bilateral under General anaesthesia for diagnosis of Right inguinal hernia.     Does have medical conditions of LVOT, dyslipidemia, hypertension with medications as managed by PCP.  Also following cardiology but no acute cardiopulmonary symptoms at this time.     - EKG 12-lead complete w/read - Clinics  - Basic metabolic panel  (Ca, Cl, CO2, Creat, Gluc, K, Na, BUN); Future  - CBC with platelets; Future      3. Essential hypertension  Well-controlled with current atenolol, okay to continue.    4. Bronchitis, allergic, unspecified asthma severity, uncomplicated  Chronic stable, no flareup at this time.  Continue albuterol as needed without the precautions given in the preop risks & recommendations.    5. Osteopenia, unspecified location  Stable, holding parameters given.    6. PVC's (premature ventricular contractions)  - Magnesium; Future       The longitudinal plan of care for the diagnosis(es)/condition(s) as documented were addressed during this visit. Due to  the added complexity in care, I will continue to support Dorie in the subsequent management and with ongoing continuity of care.      Please note that this note consists of symbols derived from keyboarding, dictation and/or voice recognition software. As a result, there may be errors in the script that have gone undetected. Please consider this when interpreting information found in this chart.    Patient Instructions   As discussed , please do pertinent work up needed for this clearance    =================      How to Take Your Medication Before Surgery  Preoperative Medication Instructions  Antiplatelet or Anticoagulation Medication Instructions   - We reviewed the medication list and the patient is not on an antiplatelet or anticoagulation medications.    Additional Medication Instructions  Take all scheduled medications on the day of surgery EXCEPT for modifications listed below:   - Herbal medications and vitamins: DO NOT TAKE 14 days prior to surgery.   - Beta Blockers (atenolol, metoprolol, propranolol) : Continue taking on the day of surgery.   - bisphosphonates (alendronate, ibandronate, risedronate): OK to hold on the day of surgery   - ibuprofen (Advil, Motrin): DO NOT TAKE 1 day before surgery.    - rescue Inhaler: Continue PRN. Bring to hospital on the day of surgery.       Patient Education  Preparing for Your Surgery  For Adults  Getting started  In most cases, a nurse will call to review your health history and instructions. They will give you an arrival time based on your scheduled surgery time. Please be ready to share:  Your doctor's clinic name and phone number  Your medical, surgical, and anesthesia history  A list of allergies and sensitivities  A list of medicines, including herbal treatments and over-the-counter drugs  Whether the patient has a legal guardian (ask how to send us the papers in advance)  Note: You may not receive a call if you were seen at our PAC (Preoperative Assessment  Center).  Please tell us if you're pregnant--or if there's any chance you might be pregnant. Some surgeries may injure a fetus (unborn baby), so they require a pregnancy test. Surgeries that are safe for a fetus don't always need a test, and you can choose whether to have one.   Preparing for surgery  Within 10 to 30 days of surgery: Have a pre-op exam (sometimes called an H&P, or History and Physical). This can be done at a clinic or pre-operative center.  If you're having a , you may not need this exam. Talk to your care team.  At your pre-op exam, talk to your care team about all medicines you take. (This includes CBD oil and any drugs, such as THC, marijuana, and other forms of cannabis.) If you need to stop any medicine before surgery, ask when to start taking it again.  This is for your safety. Many medicines and drugs can make you bleed too much during surgery. Some change how well surgery (anesthesia) drugs work.  Call your insurance company to let them know you're having surgery. (If you don't have insurance, call [phone].)  Call your clinic if there's any change in your health. This includes a scrape or scratch near the surgery site, or any signs of a cold (sore throat, runny nose, cough, rash, fever).  Eating and drinking guidelines  For your safety: Unless your surgeon tells you otherwise, follow the guidelines below.  Eat and drink as normal until 8 hours before you arrive for surgery. After that, no food or milk. You can spit out gum when you arrive.  Drink clear liquids until 2 hours before you arrive. These are liquids you can see through, like water, Gatorade, and Propel Water. They also include plain black coffee and tea (no cream or milk).  No alcohol for 24 hours before you arrive. The night before surgery, stop any drinks that contain THC.  If your care team tells you to take medicine on the morning of surgery, it's okay to take it with a sip of water. No other medicines or drugs  are allowed (including CBD oil)--follow your care team's instructions.  If you have questions the day of surgery, call your hospital or surgery center.   Preventing infection  Shower or bathe the night before and the morning of surgery. Follow the instructions your clinic gave you. (If no instructions, use regular soap.)  Don't shave or clip hair near your surgery site. We'll remove the hair if needed.  Don't smoke or vape the morning of surgery. No chewing tobacco for 6 hours before you arrive. A nicotine patch is okay. You may spit out nicotine gum when you arrive.  For some surgeries, the surgeon will tell you to fully quit smoking and nicotine.  We will make every effort to keep you safe from infection. We will:  Clean our hands often with soap and water (or an alcohol-based hand rub).  Clean the skin at your surgery site with a special soap that kills germs.  Give you a special gown to keep you warm. (Cold raises the risk of infection.)  Wear hair covers, masks, gowns, and gloves during surgery.  Give antibiotic medicine, if prescribed. Not all surgeries need this medicine.  What to bring on the day of surgery  Photo ID and insurance card  Copy of your health care directive, if you have one  Glasses and hearing aids (bring cases)  You can't wear contacts during surgery  Inhaler and eye drops, if you use them (tell us about these when you arrive)  CPAP machine or breathing device, if you use them  A few personal items, if spending the night  If you have . . .  A pacemaker, ICD (cardiac defibrillator), or other implant: Bring the ID card.  An implanted stimulator: Bring the remote control.  A legal guardian: Bring a copy of the certified (court-stamped) guardianship papers.  Please remove any jewelry, including body piercings. Leave jewelry and other valuables at home.  If you're going home the day of surgery  You must have a responsible adult drive you home. They should stay with you overnight as well.  If you  don't have someone to stay with you, and you aren't safe to go home alone, we may keep you overnight. Insurance often won't pay for this.  After surgery  If it's hard to control your pain or you need more pain medicine, please call your surgeon's office.  Questions?   If you have any questions for your care team, list them here:   ____________________________________________________________________________________________________________________________________________________________________________________________________________________________________________________________  For informational purposes only. Not to replace the advice of your health care provider. Copyright   2003, 2019 [facility]. All rights reserved. Clinically reviewed by Coorna Meng MD. [employer] [id number] - REV 08/24.     Return in about 5 months (around 10/21/2025), or if symptoms worsen or fail to improve, for Follow up of last visit, If symptoms persist, video visit.    Lori Escamilla MD  [facility] [place]        Risks and Recommendations  The patient has the following additional risks and recommendations for perioperative complications:   - Consult Hospitalist / IM to assist with post-op medical management  Cardiovascular:   - pt was following cardiology in the past for possible LVOT on Echocardiogram but no acute concerns as per review of last visit with them except there was a repeat Echo ordered for 6/2025.   Pulmonary:    - Incentive spirometry post-op   - Consider Respiratory Therapy (Respiratory Care IP Consult) post-op   - On and off seasonal flareup of chronic bronchitis cough - On Albuterol inhaler.     Preoperative Medication Instructions  Antiplatelet or Anticoagulation Medication Instructions   - We reviewed the medication list and the patient is not on an antiplatelet or anticoagulation medications.    Additional Medication Instructions  Take all scheduled medications on the day of  surgery EXCEPT for modifications listed below:   - Herbal medications and vitamins: DO NOT TAKE 14 days prior to surgery.   - Beta Blockers (atenolol, metoprolol, propranolol) : Continue taking on the day of surgery.   - bisphosphonates (alendronate, ibandronate, risedronate): OK to hold on the day of surgery   - ibuprofen (Advil, Motrin): DO NOT TAKE 1 day before surgery.    - rescue Inhaler: Continue PRN. Bring to hospital on the day of surgery.    Recommendation  Approval given to proceed with proposed procedure, without further diagnostic evaluation.      Howard Oshea is a 75 year old, presenting for the following:  Pre-Op Exam (robot assisted laparoscopic right inguinal hernia repair possible bilateral )          5/21/2025     8:50 AM   Additional Questions   Roomed by Jade MONREAL         5/21/2025   Forms   Any forms needing to be completed Yes     HPI:       Last seen patient in April 2025 for annual physical at that time, she is here for preoperative clearance.        5/19/2025   Pre-Op Questionnaire   Have you ever had a heart attack or stroke? No   Have you ever had surgery on your heart or blood vessels, such as a stent placement, a coronary artery bypass, or surgery on an artery in your head, neck, heart, or legs? No   Do you have chest pain with activity? No   Do you have a history of heart failure? No   Do you currently have a cold, bronchitis or symptoms of other infection? No   Do you have a cough, shortness of breath, or wheezing? (!) YES    Do you or anyone in your family have previous history of blood clots? (!) YES    Do you or does anyone in your family have a serious bleeding problem such as prolonged bleeding following surgeries or cuts? (!) UNKNOWN    Have you ever had problems with anemia or been told to take iron pills? No   Have you had any abnormal blood loss such as black, tarry or bloody stools, or abnormal vaginal bleeding? No   Have you ever had a blood transfusion? No   Are you  willing to have a blood transfusion if it is medically needed before, during, or after your surgery? Yes   Have you or any of your relatives ever had problems with anesthesia? No   Do you have sleep apnea, excessive snoring or daytime drowsiness? No   Do you have any artifical heart valves or other implanted medical devices like a pacemaker, defibrillator, or continuous glucose monitor? No   Do you have artificial joints? No   Are you allergic to latex? No     Advance Care Planning    Discussed advance care planning with patient; however, patient declined at this time.    Preoperative Review of    reviewed - no record of controlled substances prescribed.      Status of Chronic Conditions:  See problem list for active medical problems.  Problems all longstanding and stable, except as noted/documented.  See ROS for pertinent symptoms related to these conditions.    Patient Active Problem List    Diagnosis Date Noted    Degeneration of intervertebral disc of lumbar region with lower extremity pain 04/24/2025     Priority: Medium    Chronic low back pain 04/24/2025     Priority: Medium    Essential hypertension 01/02/2025     Priority: Medium    Bilateral hip pain 09/19/2024     Priority: Medium    Paresthesia of lower extremity 09/19/2024     Priority: Medium    Primary osteoarthritis of both hips 09/19/2024     Priority: Medium    Arthritis of sacroiliac joint 09/19/2024     Priority: Medium    Bronchitis 03/14/2024     Priority: Medium    Closed fracture of right ankle with routine healing S/P ORIF at [facility] in 12/2023 02/27/2024     Priority: Medium    Hip pain, bilateral 02/17/2023     Priority: Medium    Degenerative joint disease of sacroiliac joint 02/17/2023     Priority: Medium    Hip pain, right 02/02/2023     Priority: Medium    H. pylori infection 03/06/2021     Priority: Medium     positive stool test - treated and improved 02/ 2021       Pain of right hand 01/16/2021     Priority: Medium     mostly  thumb and index finger       Numbness and tingling in right hand 01/16/2021     Priority: Medium     Likely carpel tunnel / tendonitis - improved with using splint      Seasonal allergic rhinitis due to other allergic trigger 01/16/2021     Priority: Medium    Chronic cough 01/14/2021     Priority: Medium     mostly to clear her throat , possible gerd / or allergy related       Gastroesophageal reflux disease, unspecified whether esophagitis present 01/14/2021     Priority: Medium    Hallux valgus of left foot 04/24/2019     Priority: Medium    PVC's (premature ventricular contractions) 12/16/2018     Priority: Medium    Palpitations 12/08/2017     Priority: Medium    Family history of polyps in the colon 06/25/2015     Priority: Medium     BROTHER AND SISTER.       Hyperlipidemia LDL goal <130 10/31/2010     Priority: Medium    Family history of ischemic heart disease      Priority: Medium     parents and bothers with CAD onset age 60s      Osteopenia      Priority: Medium    Hidradenitis      Priority: Medium     recurrent, groin        Past Medical History:   Diagnosis Date    Allergy to cats     nasal symptoms    Arthritis 2015    x-ray    Essential hypertension 01/02/2025    Family history of ischemic heart disease     parents and brother with CAD onset age 60s    Heart disease 2024    Hidradenitis     recurrent, groin    Influenza with other manifestations 1972    hospitalized    Menopause 1993    age 43    Mixed hyperlipidemia     Osteopenia 2003 2014 FRAX: 12% risk major osteoporotic fx, 2.2% risk hip fx    Status post bunionectomy 05/2012    right with hammertoe repairs     Past Surgical History:   Procedure Laterality Date    BIOPSY  12/29/22    BUNIONECTOMY Left 04/24/2019    Procedure: LEFT HALLIX VAGUS AND SECOND CLAWTOE RECONSTRUCTION;  Surgeon: Tyler Jameson MD;  Location:  OR    BUNIONECTOMY [place]  05/14/2012    Procedure:BUNIONECTOMY KATIE; RIGHT FOOT EULA KATIE BUNIONECTOMY, HAMMER TOE  RECONSTRUCTION DIGITS TWO, THREE, FOUR AND FIVE; Surgeon:DEANDRE MONTE; Location: [facility]    C DEXA INTERPRETATION, AXIAL  2003    T score femur -1.3, lumbar -1.0    C DEXA INTERPRETATION, AXIAL  2004    T score lumbar -1.2, femoral -1.4    C DEXA INTERPRETATION, AXIAL  2007    T score lumbar -1.5 (decline -2.8%/yr), femoral neck -1.3/-1.8 (decline -1.8%/yr)    COLONOSCOPY      COLONOSCOPY  2000    normal, repeat 10 years    COLONOSCOPY  10/2010    normal, repeat 5 years    DEXA  10/2010    T score lumbar -1.2(stable), femoral neck -1.8/-2.2 with BMD 0.755 (6% worsening)    HC TOOTH EXTRACTION W/FORCEP      wisdom teeth extracted    REPAIR HAMMER TOE  2012    Procedure:REPAIR HAMMER TOE; Surgeon:DEANDRE MONTE; Location: [facility]    STRESS ECHO  10/2010    normal LV function at rest, hyperdynamic with exercise, EF 55-60% at rest and 70% post exercise    STRESS ECHO  10/2010    negative for echo/EKG changes of ischemia, normal LV fct with EF 55-60%, systolic fct hyperdynamic with exercise, exercised 7 min, EK.5 mm upsloping ST depression inf lead    STRESS ECHO (METRO)  2004    negative, exercise 8-9 min    ZC DEXA INTERPRETATION, AXIAL  2014    T score L1-4 of -1.4 (dec 2.7%), femoral neck -1.9/-2.2 with BMD 0.728 (dec 0.8%)     Current Outpatient Medications   Medication Sig Dispense Refill    albuterol (PROAIR HFA/PROVENTIL HFA/VENTOLIN HFA) 108 (90 Base) MCG/ACT inhaler INHALE 2 PUFFS INTO THE LUNGS EVERY 6 HOURS AS NEEDED FOR SHORTNESS OF BREATH, WHEEZING OR COUGH 6.7 g 1    alendronate (FOSAMAX) 70 MG tablet TAKE 1 TABLET BY MOUTH EVERY 7 DAYS 12 tablet 2    atenolol (TENORMIN) 25 MG tablet Take 1 tablet (25 mg) by mouth daily. 90 tablet 3    atorvastatin (LIPITOR) 20 MG tablet TAKE 1 TABLET BY MOUTH EVERY DAY 90 tablet 2    carboxymethylcellulose PF (REFRESH PLUS) 0.5 % SOLN ophthalmic solution Place 1-2 drops into both eyes At Bedtime      docusate sodium (COLACE) 100 MG  capsule Take 1 capsule (100 mg) by mouth 2 times daily as needed for constipation. 100 capsule 12    melatonin 1 MG TABS Take 2 mg by mouth At Bedtime       Multiple Vitamins-Minerals (PRESERVISION AREDS 2) CHEW Take by mouth 2 times daily      nifedipine 0.2% in white petrolatum 0.2 % OINT ointment Place rectally 2 times daily. 100 g 0    omeprazole (PRILOSEC) 20 MG DR capsule TAKE 1 CAPSULE BY MOUTH TWICE A  capsule 2    polyethylene glycol 0.4%- propylene glycol 0.3% (SYSTANE ULTRA) 0.4-0.3 % SOLN ophthalmic solution Place 1-2 drops into both eyes daily as needed for dry eyes      Probiotic Product (PROBIOTIC-10) CAPS Take 1 capsule by mouth daily      Psyllium (METAMUCIL) 0.36 g CAPS Take 1 capsule by mouth daily as needed (for constipation). Metamucil powder daily 30 capsule 1    tiZANidine (ZANAFLEX) 2 MG tablet Take 1 tablet at bedtime as needed 30 tablet 0    nifedipine 0.2% in white petrolatum 0.2 % OINT ointment APPLY TOPICALLY TWICE DAILY AS DIRECTED      tiZANidine (ZANAFLEX) 2 MG tablet Take 1 tablet (2 mg) by mouth nightly as needed for muscle spasms. 30 tablet 0    triamcinolone (KENALOG) 0.1 % external cream Apply topically 2 times daily. 45 g 0       No Known Allergies     Social History     Tobacco Use    Smoking status: Never    Smokeless tobacco: Never   Substance Use Topics    Alcohol use: Yes     Comment: Wine occasionally     Family History   Problem Relation Age of Onset    C.A.D. Father         MI age 61,   age 76 from MI    Diabetes Father     Cancer Father         esophagus, former smoker    Obesity Father     Coronary Artery Disease Father             Other Cancer Father     C.A.D. Mother         MI age 62    Lipids Mother     Blood Disease Mother         pernicious anemia    Thyroid Disease Mother     Eye Disorder Mother         macular degeneration    Osteoporosis Mother     Connective Tissue Disorder Mother         SLE - remission    Hypertension Mother      "Hyperlipidemia Mother     Asthma Mother     Dementia Mother         memory loss, onset early 90s    Coronary Artery Disease Mother             C.A.D. Maternal Grandmother          early 80s    C.A.D. Maternal Grandfather          at younger age    C.A.D. Paternal Grandmother          late 70s    Hypertension Brother     Prostate Cancer Brother     Thyroid Disease Brother     Colon Polyps Brother         sister may also have colon polyps    Coronary Artery Disease Brother         angioplasty    Asthma Brother     Obesity Brother         No longer obese    Arrhythmia Brother     Hyperlipidemia Sister     Osteoporosis Sister         she has osteopenia    Colon Polyps Sister         possible    Breast Cancer Sister     Coronary Artery Disease Brother         angioplasty    Hypertension Brother     Asthma Brother     Obesity Brother     Hyperlipidemia Sister     Osteoporosis Sister     Hypertension Brother     Prostate Cancer Brother      History   Drug Use Unknown             Review of Systems  Constitutional, HEENT, cardiovascular, pulmonary, GI, , musculoskeletal, neuro, skin, endocrine and psych systems are negative, except as otherwise noted.    Objective    /74   Pulse 63   Temp 96.9  F (36.1  C) (Temporal)   Resp 10   Ht 1.53 m (5' 0.24\")   Wt 60.5 kg (133 lb 4.8 oz)   SpO2 96%   BMI 25.83 kg/m     Estimated body mass index is 25.83 kg/m  as calculated from the following:    Height as of this encounter: 1.53 m (5' 0.24\").    Weight as of this encounter: 60.5 kg (133 lb 4.8 oz).  Physical Exam  GENERAL: alert and no distress  EYES: Eyes grossly normal to inspection, PERRL and conjunctivae and sclerae normal  HENT: ear canals and TM's normal, nose and mouth without ulcers or lesions  NECK: no adenopathy, no asymmetry, masses, or scars  RESP: lungs clear to auscultation - no rales, rhonchi or wheezes  CV: regular rate and rhythm, normal S1 S2, no S3 or S4, no murmur, click or rub, " no peripheral edema  ABDOMEN: soft, nontender, no hepatosplenomegaly, no masses and bowel sounds normal  MS: no gross musculoskeletal defects noted, no edema  SKIN: no suspicious lesions or rashes  NEURO: Normal strength and tone, mentation intact and speech normal  PSYCH: mentation appears normal, affect normal/bright    Recent Labs   Lab Test 04/16/25  1031 12/19/24  2154   HGB 12.3 11.5*    271    137   POTASSIUM 4.3 4.1   CR 0.85 0.97*        Diagnostics  Labs pending at this time.  Results will be reviewed when available.  No results found for this or any previous visit (from the past 720 hours).   EKG required for structural heart disease and Possible LVOT asymptomatic , Echo with normal LVEF , intermediate risk surgery , general anaesthesia.  and not completed in the last 90 days.   EKG: sinus bradycardia, normal axis, normal intervals, no acute ST/T changes c/w ischemia, no LVH by voltage criteria, Right Bundle Branch Block, unchanged from previous tracings    Revised Cardiac Risk Index (RCRI)  The patient has the following serious cardiovascular risks for perioperative complications:   - No serious cardiac risks = 0 points     RCRI Interpretation: 0 points: Class I (very low risk - 0.4% complication rate)         Signed Electronically by: Lori Escamilla MD  A copy of this evaluation report is provided to the requesting physician.

## 2025-05-21 NOTE — PATIENT INSTRUCTIONS
As discussed , please do pertinent work up needed for this clearance    =================      How to Take Your Medication Before Surgery  Preoperative Medication Instructions   Antiplatelet or Anticoagulation Medication Instructions   - We reviewed the medication list and the patient is not on an antiplatelet or anticoagulation medications.    Additional Medication Instructions  Take all scheduled medications on the day of surgery EXCEPT for modifications listed below:   - Herbal medications and vitamins: DO NOT TAKE 14 days prior to surgery.   - Beta Blockers (atenolol, metoprolol, propranolol) : Continue taking on the day of surgery.   - bisphosphonates (alendronate, ibandronate, risedronate): OK to hold on the day of surgery   - ibuprofen (Advil, Motrin): DO NOT TAKE 1 day before surgery.    - rescue Inhaler: Continue PRN. Bring to hospital on the day of surgery.       Patient Education   Preparing for Your Surgery  For Adults  Getting started  In most cases, a nurse will call to review your health history and instructions. They will give you an arrival time based on your scheduled surgery time. Please be ready to share:  Your doctor's clinic name and phone number  Your medical, surgical, and anesthesia history  A list of allergies and sensitivities  A list of medicines, including herbal treatments and over-the-counter drugs  Whether the patient has a legal guardian (ask how to send us the papers in advance)  Note: You may not receive a call if you were seen at our PAC (Preoperative Assessment Center).  Please tell us if you're pregnant--or if there's any chance you might be pregnant. Some surgeries may injure a fetus (unborn baby), so they require a pregnancy test. Surgeries that are safe for a fetus don't always need a test, and you can choose whether to have one.   Preparing for surgery  Within 10 to 30 days of surgery: Have a pre-op exam (sometimes called an H&P, or History and Physical). This can be done at  a clinic or pre-operative center.  If you're having a , you may not need this exam. Talk to your care team.  At your pre-op exam, talk to your care team about all medicines you take. (This includes CBD oil and any drugs, such as THC, marijuana, and other forms of cannabis.) If you need to stop any medicine before surgery, ask when to start taking it again.  This is for your safety. Many medicines and drugs can make you bleed too much during surgery. Some change how well surgery (anesthesia) drugs work.  Call your insurance company to let them know you're having surgery. (If you don't have insurance, call [phone].)  Call your clinic if there's any change in your health. This includes a scrape or scratch near the surgery site, or any signs of a cold (sore throat, runny nose, cough, rash, fever).  Eating and drinking guidelines  For your safety: Unless your surgeon tells you otherwise, follow the guidelines below.  Eat and drink as normal until 8 hours before you arrive for surgery. After that, no food or milk. You can spit out gum when you arrive.  Drink clear liquids until 2 hours before you arrive. These are liquids you can see through, like water, Gatorade, and Propel Water. They also include plain black coffee and tea (no cream or milk).  No alcohol for 24 hours before you arrive. The night before surgery, stop any drinks that contain THC.  If your care team tells you to take medicine on the morning of surgery, it's okay to take it with a sip of water. No other medicines or drugs are allowed (including CBD oil)--follow your care team's instructions.  If you have questions the day of surgery, call your hospital or surgery center.   Preventing infection  Shower or bathe the night before and the morning of surgery. Follow the instructions your clinic gave you. (If no instructions, use regular soap.)  Don't shave or clip hair near your surgery site. We'll remove the hair if needed.  Don't smoke or vape  the morning of surgery. No chewing tobacco for 6 hours before you arrive. A nicotine patch is okay. You may spit out nicotine gum when you arrive.  For some surgeries, the surgeon will tell you to fully quit smoking and nicotine.  We will make every effort to keep you safe from infection. We will:  Clean our hands often with soap and water (or an alcohol-based hand rub).  Clean the skin at your surgery site with a special soap that kills germs.  Give you a special gown to keep you warm. (Cold raises the risk of infection.)  Wear hair covers, masks, gowns, and gloves during surgery.  Give antibiotic medicine, if prescribed. Not all surgeries need this medicine.  What to bring on the day of surgery  Photo ID and insurance card  Copy of your health care directive, if you have one  Glasses and hearing aids (bring cases)  You can't wear contacts during surgery  Inhaler and eye drops, if you use them (tell us about these when you arrive)  CPAP machine or breathing device, if you use them  A few personal items, if spending the night  If you have . . .  A pacemaker, ICD (cardiac defibrillator), or other implant: Bring the ID card.  An implanted stimulator: Bring the remote control.  A legal guardian: Bring a copy of the certified (court-stamped) guardianship papers.  Please remove any jewelry, including body piercings. Leave jewelry and other valuables at home.  If you're going home the day of surgery  You must have a responsible adult drive you home. They should stay with you overnight as well.  If you don't have someone to stay with you, and you aren't safe to go home alone, we may keep you overnight. Insurance often won't pay for this.  After surgery  If it's hard to control your pain or you need more pain medicine, please call your surgeon's office.  Questions?   If you have any questions for your care team, list them here:    ____________________________________________________________________________________________________________________________________________________________________________________________________________________________________________________________  For informational purposes only. Not to replace the advice of your health care provider. Copyright   2003, 2019 [facility]. All rights reserved. Clinically reviewed by Corona Meng MD. [employer] [id number] - REV 08/24.

## 2025-05-22 ENCOUNTER — RESULTS FOLLOW-UP (OUTPATIENT)
Dept: FAMILY MEDICINE | Facility: CLINIC | Age: 75
End: 2025-05-22

## 2025-05-22 DIAGNOSIS — J45.901 BRONCHITIS, ALLERGIC, UNSPECIFIED ASTHMA SEVERITY, WITH ACUTE EXACERBATION: ICD-10-CM

## 2025-05-22 RX ORDER — ALBUTEROL SULFATE 90 UG/1
2 INHALANT RESPIRATORY (INHALATION) EVERY 6 HOURS PRN
Qty: 18 G | Refills: 0 | Status: SHIPPED | OUTPATIENT
Start: 2025-05-22

## 2025-06-08 ENCOUNTER — ANESTHESIA EVENT (OUTPATIENT)
Dept: SURGERY | Facility: CLINIC | Age: 75
End: 2025-06-08
Payer: COMMERCIAL

## 2025-06-09 ENCOUNTER — HOSPITAL ENCOUNTER (OUTPATIENT)
Facility: CLINIC | Age: 75
Discharge: HOME | End: 2025-06-09
Attending: STUDENT IN AN ORGANIZED HEALTH CARE EDUCATION/TRAINING PROGRAM | Admitting: STUDENT IN AN ORGANIZED HEALTH CARE EDUCATION/TRAINING PROGRAM
Payer: COMMERCIAL

## 2025-06-09 ENCOUNTER — ANESTHESIA (OUTPATIENT)
Dept: SURGERY | Facility: CLINIC | Age: 75
End: 2025-06-09
Payer: COMMERCIAL

## 2025-06-09 VITALS
OXYGEN SATURATION: 97 % | BODY MASS INDEX: 25.53 KG/M2 | RESPIRATION RATE: 11 BRPM | HEIGHT: 61 IN | DIASTOLIC BLOOD PRESSURE: 62 MMHG | HEART RATE: 72 BPM | SYSTOLIC BLOOD PRESSURE: 121 MMHG | TEMPERATURE: 97.6 F | WEIGHT: 135.2 LBS

## 2025-06-09 DIAGNOSIS — G89.18 POSTOPERATIVE PAIN: Primary | ICD-10-CM

## 2025-06-09 PROCEDURE — 250N000011 HC RX IP 250 OP 636: Performed by: STUDENT IN AN ORGANIZED HEALTH CARE EDUCATION/TRAINING PROGRAM

## 2025-06-09 PROCEDURE — 710N000009 HC RECOVERY PHASE 1, LEVEL 1, PER MIN: Performed by: STUDENT IN AN ORGANIZED HEALTH CARE EDUCATION/TRAINING PROGRAM

## 2025-06-09 PROCEDURE — 250N000011 HC RX IP 250 OP 636: Mod: JZ | Performed by: ANESTHESIOLOGY

## 2025-06-09 PROCEDURE — S2900 ROBOTIC SURGICAL SYSTEM: HCPCS | Performed by: STUDENT IN AN ORGANIZED HEALTH CARE EDUCATION/TRAINING PROGRAM

## 2025-06-09 PROCEDURE — 250N000009 HC RX 250: Performed by: STUDENT IN AN ORGANIZED HEALTH CARE EDUCATION/TRAINING PROGRAM

## 2025-06-09 PROCEDURE — 250N000011 HC RX IP 250 OP 636: Performed by: NURSE ANESTHETIST, CERTIFIED REGISTERED

## 2025-06-09 PROCEDURE — 999N000141 HC STATISTIC PRE-PROCEDURE NURSING ASSESSMENT: Performed by: STUDENT IN AN ORGANIZED HEALTH CARE EDUCATION/TRAINING PROGRAM

## 2025-06-09 PROCEDURE — 250N000013 HC RX MED GY IP 250 OP 250 PS 637: Performed by: PHYSICIAN ASSISTANT

## 2025-06-09 PROCEDURE — 370N000017 HC ANESTHESIA TECHNICAL FEE, PER MIN: Performed by: STUDENT IN AN ORGANIZED HEALTH CARE EDUCATION/TRAINING PROGRAM

## 2025-06-09 PROCEDURE — 272N000001 HC OR GENERAL SUPPLY STERILE: Performed by: STUDENT IN AN ORGANIZED HEALTH CARE EDUCATION/TRAINING PROGRAM

## 2025-06-09 PROCEDURE — 49650 LAP ING HERNIA REPAIR INIT: CPT | Mod: 50 | Performed by: STUDENT IN AN ORGANIZED HEALTH CARE EDUCATION/TRAINING PROGRAM

## 2025-06-09 PROCEDURE — 258N000003 HC RX IP 258 OP 636: Performed by: ANESTHESIOLOGY

## 2025-06-09 PROCEDURE — 710N000012 HC RECOVERY PHASE 2, PER MINUTE: Performed by: STUDENT IN AN ORGANIZED HEALTH CARE EDUCATION/TRAINING PROGRAM

## 2025-06-09 PROCEDURE — 360N000080 HC SURGERY LEVEL 7, PER MIN: Performed by: STUDENT IN AN ORGANIZED HEALTH CARE EDUCATION/TRAINING PROGRAM

## 2025-06-09 PROCEDURE — C1781 MESH (IMPLANTABLE): HCPCS | Performed by: STUDENT IN AN ORGANIZED HEALTH CARE EDUCATION/TRAINING PROGRAM

## 2025-06-09 PROCEDURE — 250N000025 HC SEVOFLURANE, PER MIN: Performed by: STUDENT IN AN ORGANIZED HEALTH CARE EDUCATION/TRAINING PROGRAM

## 2025-06-09 PROCEDURE — 250N000009 HC RX 250: Performed by: NURSE ANESTHETIST, CERTIFIED REGISTERED

## 2025-06-09 DEVICE — LAPAROSCOPIC SELF-FIXATING MESH POLYESTER WITH POLYLACTIC ACID GRIPS AND COLLAGEN FILM
Type: IMPLANTABLE DEVICE | Site: INGUINAL | Status: FUNCTIONAL
Brand: PROGRIP

## 2025-06-09 RX ORDER — ONDANSETRON 4 MG/1
4 TABLET, ORALLY DISINTEGRATING ORAL EVERY 30 MIN PRN
Status: DISCONTINUED | OUTPATIENT
Start: 2025-06-09 | End: 2025-06-09 | Stop reason: HOSPADM

## 2025-06-09 RX ORDER — ACETAMINOPHEN 325 MG/1
975 TABLET ORAL ONCE
Status: COMPLETED | OUTPATIENT
Start: 2025-06-09 | End: 2025-06-09

## 2025-06-09 RX ORDER — FENTANYL CITRATE 0.05 MG/ML
50 INJECTION, SOLUTION INTRAMUSCULAR; INTRAVENOUS EVERY 5 MIN PRN
Status: DISCONTINUED | OUTPATIENT
Start: 2025-06-09 | End: 2025-06-09 | Stop reason: HOSPADM

## 2025-06-09 RX ORDER — NALOXONE HYDROCHLORIDE 0.4 MG/ML
0.1 INJECTION, SOLUTION INTRAMUSCULAR; INTRAVENOUS; SUBCUTANEOUS
Status: DISCONTINUED | OUTPATIENT
Start: 2025-06-09 | End: 2025-06-09 | Stop reason: HOSPADM

## 2025-06-09 RX ORDER — FENTANYL CITRATE 50 UG/ML
INJECTION, SOLUTION INTRAMUSCULAR; INTRAVENOUS PRN
Status: DISCONTINUED | OUTPATIENT
Start: 2025-06-09 | End: 2025-06-09

## 2025-06-09 RX ORDER — MAGNESIUM HYDROXIDE 1200 MG/15ML
LIQUID ORAL PRN
Status: DISCONTINUED | OUTPATIENT
Start: 2025-06-09 | End: 2025-06-09 | Stop reason: HOSPADM

## 2025-06-09 RX ORDER — HYDROMORPHONE HCL IN WATER/PF 6 MG/30 ML
0.4 PATIENT CONTROLLED ANALGESIA SYRINGE INTRAVENOUS EVERY 5 MIN PRN
Status: DISCONTINUED | OUTPATIENT
Start: 2025-06-09 | End: 2025-06-09 | Stop reason: HOSPADM

## 2025-06-09 RX ORDER — OXYCODONE HYDROCHLORIDE 5 MG/1
2.5-5 TABLET ORAL EVERY 4 HOURS PRN
Qty: 10 TABLET | Refills: 0 | Status: SHIPPED | OUTPATIENT
Start: 2025-06-09 | End: 2025-06-11

## 2025-06-09 RX ORDER — DEXAMETHASONE SODIUM PHOSPHATE 4 MG/ML
4 INJECTION, SOLUTION INTRA-ARTICULAR; INTRALESIONAL; INTRAMUSCULAR; INTRAVENOUS; SOFT TISSUE
Status: DISCONTINUED | OUTPATIENT
Start: 2025-06-09 | End: 2025-06-09 | Stop reason: HOSPADM

## 2025-06-09 RX ORDER — BUPIVACAINE HCL/EPINEPHRINE 0.5-1:200K
VIAL (ML) INJECTION PRN
Status: DISCONTINUED | OUTPATIENT
Start: 2025-06-09 | End: 2025-06-09 | Stop reason: HOSPADM

## 2025-06-09 RX ORDER — CEFAZOLIN SODIUM/WATER 2 G/20 ML
2 SYRINGE (ML) INTRAVENOUS SEE ADMIN INSTRUCTIONS
Status: DISCONTINUED | OUTPATIENT
Start: 2025-06-09 | End: 2025-06-09 | Stop reason: HOSPADM

## 2025-06-09 RX ORDER — ONDANSETRON 2 MG/ML
INJECTION INTRAMUSCULAR; INTRAVENOUS PRN
Status: DISCONTINUED | OUTPATIENT
Start: 2025-06-09 | End: 2025-06-09

## 2025-06-09 RX ORDER — LIDOCAINE 40 MG/G
CREAM TOPICAL
Status: DISCONTINUED | OUTPATIENT
Start: 2025-06-09 | End: 2025-06-09 | Stop reason: HOSPADM

## 2025-06-09 RX ORDER — FENTANYL CITRATE 0.05 MG/ML
25 INJECTION, SOLUTION INTRAMUSCULAR; INTRAVENOUS EVERY 5 MIN PRN
Status: DISCONTINUED | OUTPATIENT
Start: 2025-06-09 | End: 2025-06-09 | Stop reason: HOSPADM

## 2025-06-09 RX ORDER — HYDROMORPHONE HCL IN WATER/PF 6 MG/30 ML
0.2 PATIENT CONTROLLED ANALGESIA SYRINGE INTRAVENOUS EVERY 5 MIN PRN
Status: DISCONTINUED | OUTPATIENT
Start: 2025-06-09 | End: 2025-06-09 | Stop reason: HOSPADM

## 2025-06-09 RX ORDER — DEXAMETHASONE SODIUM PHOSPHATE 4 MG/ML
INJECTION, SOLUTION INTRA-ARTICULAR; INTRALESIONAL; INTRAMUSCULAR; INTRAVENOUS; SOFT TISSUE PRN
Status: DISCONTINUED | OUTPATIENT
Start: 2025-06-09 | End: 2025-06-09

## 2025-06-09 RX ORDER — PROPOFOL 10 MG/ML
INJECTION, EMULSION INTRAVENOUS CONTINUOUS PRN
Status: DISCONTINUED | OUTPATIENT
Start: 2025-06-09 | End: 2025-06-09

## 2025-06-09 RX ORDER — PROPOFOL 10 MG/ML
INJECTION, EMULSION INTRAVENOUS PRN
Status: DISCONTINUED | OUTPATIENT
Start: 2025-06-09 | End: 2025-06-09

## 2025-06-09 RX ORDER — LIDOCAINE HYDROCHLORIDE 20 MG/ML
INJECTION, SOLUTION INFILTRATION; PERINEURAL PRN
Status: DISCONTINUED | OUTPATIENT
Start: 2025-06-09 | End: 2025-06-09

## 2025-06-09 RX ORDER — LABETALOL HYDROCHLORIDE 5 MG/ML
10 INJECTION, SOLUTION INTRAVENOUS
Status: DISCONTINUED | OUTPATIENT
Start: 2025-06-09 | End: 2025-06-09 | Stop reason: HOSPADM

## 2025-06-09 RX ORDER — KETOROLAC TROMETHAMINE 30 MG/ML
INJECTION, SOLUTION INTRAMUSCULAR; INTRAVENOUS PRN
Status: DISCONTINUED | OUTPATIENT
Start: 2025-06-09 | End: 2025-06-09

## 2025-06-09 RX ORDER — SODIUM CHLORIDE, SODIUM LACTATE, POTASSIUM CHLORIDE, CALCIUM CHLORIDE 600; 310; 30; 20 MG/100ML; MG/100ML; MG/100ML; MG/100ML
INJECTION, SOLUTION INTRAVENOUS CONTINUOUS
Status: DISCONTINUED | OUTPATIENT
Start: 2025-06-09 | End: 2025-06-09 | Stop reason: HOSPADM

## 2025-06-09 RX ORDER — OXYCODONE HYDROCHLORIDE 5 MG/1
5 TABLET ORAL
Status: COMPLETED | OUTPATIENT
Start: 2025-06-09 | End: 2025-06-09

## 2025-06-09 RX ORDER — ACETAMINOPHEN 325 MG/1
650 TABLET ORAL
Status: DISCONTINUED | OUTPATIENT
Start: 2025-06-09 | End: 2025-06-09 | Stop reason: HOSPADM

## 2025-06-09 RX ORDER — CEFAZOLIN SODIUM/WATER 2 G/20 ML
2 SYRINGE (ML) INTRAVENOUS
Status: COMPLETED | OUTPATIENT
Start: 2025-06-09 | End: 2025-06-09

## 2025-06-09 RX ORDER — ONDANSETRON 2 MG/ML
4 INJECTION INTRAMUSCULAR; INTRAVENOUS EVERY 30 MIN PRN
Status: DISCONTINUED | OUTPATIENT
Start: 2025-06-09 | End: 2025-06-09 | Stop reason: HOSPADM

## 2025-06-09 RX ADMIN — ROCURONIUM BROMIDE 10 MG: 50 INJECTION, SOLUTION INTRAVENOUS at 13:15

## 2025-06-09 RX ADMIN — CEFAZOLIN 2 G: 500 INJECTION, POWDER, FOR SOLUTION INTRAMUSCULAR; INTRAVENOUS at 11:53

## 2025-06-09 RX ADMIN — FENTANYL CITRATE 50 MCG: 50 INJECTION INTRAMUSCULAR; INTRAVENOUS at 11:59

## 2025-06-09 RX ADMIN — ROCURONIUM BROMIDE 50 MG: 50 INJECTION, SOLUTION INTRAVENOUS at 11:59

## 2025-06-09 RX ADMIN — SODIUM CHLORIDE, SODIUM LACTATE, POTASSIUM CHLORIDE, AND CALCIUM CHLORIDE: .6; .31; .03; .02 INJECTION, SOLUTION INTRAVENOUS at 10:24

## 2025-06-09 RX ADMIN — ONDANSETRON 4 MG: 2 INJECTION INTRAMUSCULAR; INTRAVENOUS at 13:54

## 2025-06-09 RX ADMIN — LIDOCAINE HYDROCHLORIDE 80 MG: 20 INJECTION, SOLUTION INFILTRATION; PERINEURAL at 11:59

## 2025-06-09 RX ADMIN — Medication 200 MG: at 13:54

## 2025-06-09 RX ADMIN — HYDROMORPHONE HYDROCHLORIDE 0.2 MG: 0.2 INJECTION, SOLUTION INTRAMUSCULAR; INTRAVENOUS; SUBCUTANEOUS at 15:16

## 2025-06-09 RX ADMIN — KETOROLAC TROMETHAMINE 15 MG: 30 INJECTION, SOLUTION INTRAMUSCULAR at 13:54

## 2025-06-09 RX ADMIN — SODIUM CHLORIDE, SODIUM LACTATE, POTASSIUM CHLORIDE, AND CALCIUM CHLORIDE: .6; .31; .03; .02 INJECTION, SOLUTION INTRAVENOUS at 13:58

## 2025-06-09 RX ADMIN — OXYCODONE HYDROCHLORIDE 5 MG: 5 TABLET ORAL at 15:47

## 2025-06-09 RX ADMIN — PROPOFOL 150 MG: 10 INJECTION, EMULSION INTRAVENOUS at 11:59

## 2025-06-09 RX ADMIN — FENTANYL CITRATE 50 MCG: 50 INJECTION INTRAMUSCULAR; INTRAVENOUS at 12:21

## 2025-06-09 RX ADMIN — DEXAMETHASONE SODIUM PHOSPHATE 4 MG: 4 INJECTION, SOLUTION INTRA-ARTICULAR; INTRALESIONAL; INTRAMUSCULAR; INTRAVENOUS; SOFT TISSUE at 11:59

## 2025-06-09 RX ADMIN — PROPOFOL 50 MCG/KG/MIN: 10 INJECTION, EMULSION INTRAVENOUS at 11:59

## 2025-06-09 RX ADMIN — HYDROMORPHONE HYDROCHLORIDE 0.5 MG: 1 INJECTION, SOLUTION INTRAMUSCULAR; INTRAVENOUS; SUBCUTANEOUS at 13:12

## 2025-06-09 ASSESSMENT — ENCOUNTER SYMPTOMS
SEIZURES: 0
DYSRHYTHMIAS: 0

## 2025-06-09 ASSESSMENT — ACTIVITIES OF DAILY LIVING (ADL)
ADLS_ACUITY_SCORE: 41

## 2025-06-09 NOTE — OP NOTE
General Surgery Operative Note    Procedure Date: 06/09/25     PREOPERATIVE DIAGNOSIS:  Right inguinal hernia, possible Left inguinal hernia.    POSTOPERATIVE DIAGNOSIS:  Bilateral inguinal hernias.    PROCEDURE PERFORMED:  Robotic repair of bilateral inguinal hernias with placement of bilateral preperitoneal mesh.    STAFF SURGEON:  Wyatt Washington MD     ASSISTANT: Irena Panda PA-C assisted in the above procedure. They provided assistance with pre-operative positioning, prepping, and draping of the patient. The assistant provided vital operative assistance with retraction using instruments thus providing the necessary exposure and visualization for the case, manipulation of tissues to achieve hemostasis, suction for visualization and assisted in closure of the wound. Post-operatively they assisted in transfer of the patient off the operative table and transition to the PACU physicians.     INDICATIONS FOR OPERATION:  Dorie Gagnon is an 75-year-old female who presented to the surgery clinic with complaints of symptomatic bulging in the right inguinal area.  On examination, the patient was found to have evidence of a right inguinal hernia.  The decision was made for the patient to proceed to the operating room for robotic repair of Right inguinal hernia. At the same time, if a left inguinal hernia is found, that will be repaired at the same time.  The risks and benefits of the procedure were discussed with the patient and consent for the procedure was obtained.    ANESTHESIA:  General.    DESCRIPTION OF PROCEDURE:  The patient was brought to the preoperative area and preoperative antibiotics were administered.  The patient was brought back to the operating room and placed in the supine position on the operating table.  A timeout was completed.  Anesthesia was induced and the patient was intubated.  The patient was secured to the operating table and his pressure points were padded.  The patient's abdomen was  prepped and draped in the sterile fashion.  Following infiltration with local anesthetic, the 11 blade scalpel was used to make a small incision overlying the patient's left upper quadrant.  Entrance into the abdominal cavity was then gained under direct visualization using the 5 mm Visiport and the 5 mm laparoscope.  When the patient's abdomen had been safely entered, it was fully insufflated.  The laparoscope was reinserted into the abdominal cavity and initial inspection revealed no evidence of injury at the port entry site.  Under direct visualization, 2 additional 8 mm robotic ports were placed, one in the supraumbilical position and one in the right upper quadrant.  The patient's left upper quadrant port was then upsized to an 8 mm robotic port.  The patient was placed into the Trendelenburg position.  The robot was brought onto the field and docked.  The robotic fenestrated grasper and robotic monopolar scissors were introduced into the abdominal cavity under direct visualization.      We then addressed the patient's inguinal areas bilaterally.  On the right side, the patient was found to have a moderate sized indirect inguinal hernia.  On the left side, the patient was found to have small indirect inguinal hernia.  We began on the patient's right side.  The peritoneum was incised from the level of the anterior superior iliac spine to the level of the abdominal midline superior to the patient's hernia defect.  The preperitoneal space was then developed using blunt dissection and the electrocautery.  We dissected down to John's ligament medially and then continued our dissection in a medial to lateral fashion.  There was no direct inguinal hernia on the right side.  We then carefully  the patient's indirect inguinal hernia sac free from the broad ligament.  We continued our dissection out laterally to accommodate placement of the mesh.  The peritoneal edge was then swept inferiorly along the length  of our preperitoneal pocket.  Attention was then turned to the patient's left side.  In a similar fashion, the peritoneum was incised and the preperitoneal space was developed.  We dissected down to John's ligament medially and then continued our medial dissection, so we met up with the medial dissection from the right side. A small obturator hernia was identified and reduce.  The patient's direct inguinal hernia sac and associated preperitoneal fat were reduced from the indirect defect.  We continued our dissection out laterally and carefully  the peritoneal edge from the broad ligament.  We dissected further out laterally to accommodate placement of the mesh.  The patient's peritoneal edge was then swept inferiorly along the length of our preperitoneal pocket.  A piece of extra large right sided and extra large left sided (12cm x 16cm) Progrip mesh were then rolled and inserted into the abdominal cavity.  Each piece of mesh was situated in the correct sided pocket with excellent overlap of the myopectineal orifice bilaterally.  The mesh was overlapped in the region of the pubis. When the mesh had been adequately positioned and secured, the peritoneum was reapproximated over each piece of mesh using a running 3-0 Stratafix suture. Peritoneal rents were repaired with 3-0 vicryl suture.  The surgical sites were inspected and found to be hemostatic.  The remaining needles and suture material were removed from the abdominal cavity under direct visualization.  The robotic equipment was removed from the abdominal cavity and the robot was undocked.  The patient's abdomen was allowed to desufflate fully and the robotic ports were removed.  The port sites were inspected and hemostasis was ensured.  The port sites were then reapproximated using 4-0 Monocryl subcuticular stitches.  The incisions were washed and dried and skin glue was applied.  Lap, needle and instrument counts were correct at the end of the  procedure.  The patient tolerated the procedure well and was extubated and taken to the recovery area in stable condition.      ESTIMATED BLOOD LOSS:  5 mL.    FINDINGS:  bilateral indirect inguinal hernia, reduced and repaired with mesh preperitoneally. Small left obturator hernia identified and reduced.    COMPLICATIONS:  None.    SPECIMENS:  None.    DRAINS:  None.    CONDITION:  The patient will be discharged to home directly from the postanesthesia care unit with instructions for postoperative cares and medications for pain management.    Wyatt Washington MD

## 2025-06-09 NOTE — INTERVAL H&P NOTE
"I have reviewed the surgical (or preoperative) H&P that is linked to this encounter, and examined the patient. There are no significant changes    Clinical Conditions Present on Arrival:  Clinically Significant Risk Factors Present on Admission                       # Overweight: Estimated body mass index is 25.55 kg/m  as calculated from the following:    Height as of this encounter: 1.549 m (5' 1\").    Weight as of this encounter: 61.3 kg (135 lb 3.2 oz).       "

## 2025-06-09 NOTE — ANESTHESIA PREPROCEDURE EVALUATION
Anesthesia Pre-Procedure Evaluation    Patient: Dorie Gagnon   MRN: [id number] : 1950          Procedure : Procedure(s):  robot assisted laparoscopic right inguinal hernia repair possible bilateral         Past Medical History:   Diagnosis Date    Allergy to cats     nasal symptoms    Arthritis     x-ray    Essential hypertension 2025    Family history of ischemic heart disease     parents and brother with CAD onset age 60s    Heart disease     Hidradenitis     recurrent, groin    Influenza with other manifestations 1972    hospitalized    Menopause     age 43    Mixed hyperlipidemia     Osteopenia 2003 FRAX: 12% risk major osteoporotic fx, 2.2% risk hip fx    Status post bunionectomy 2012    right with hammertoe repairs      Past Surgical History:   Procedure Laterality Date    BIOPSY  22    BUNIONECTOMY Left 2019    Procedure: LEFT HALLIX VAGUS AND SECOND CLAWTOE RECONSTRUCTION;  Surgeon: Tyler Jameson MD;  Location:  OR    BUNIONECTOMY KATIE  2012    Procedure:BUNIONECTOMY KATIE; RIGHT FOOT EULA KATIE BUNIONECTOMY, HAMMER TOE RECONSTRUCTION DIGITS TWO, THREE, FOUR AND FIVE; Surgeon:DEANDRE MONTE; Location: [facility]    C DEXA INTERPRETATION, AXIAL  2003    T score femur -1.3, lumbar -1.0    C DEXA INTERPRETATION, AXIAL  2004    T score lumbar -1.2, femoral -1.4    C DEXA INTERPRETATION, AXIAL  2007    T score lumbar -1.5 (decline -2.8%/yr), femoral neck -1.3/-1.8 (decline -1.8%/yr)    COLONOSCOPY      COLONOSCOPY  2000    normal, repeat 10 years    COLONOSCOPY  10/2010    normal, repeat 5 years    DEXA  10/2010    T score lumbar -1.2(stable), femoral neck -1.8/-2.2 with BMD 0.755 (6% worsening)    HC TOOTH EXTRACTION W/FORCEP      wisdom teeth extracted    REPAIR HAMMER TOE  2012    Procedure:REPAIR HAMMER TOE; Surgeon:DEANDRE MONTE; Location: [facility]    STRESS ECHO  10/2010    normal LV function at rest, hyperdynamic  with exercise, EF 55-60% at rest and 70% post exercise    STRESS ECHO  10/2010    negative for echo/EKG changes of ischemia, normal LV fct with EF 55-60%, systolic fct hyperdynamic with exercise, exercised 7 min, EK.5 mm upsloping ST depression inf lead    STRESS ECHO (METRO)  2004    negative, exercise 8-9 min    ZZC DEXA INTERPRETATION, AXIAL  2014    T score L1-4 of -1.4 (dec 2.7%), femoral neck -1.9/-2.2 with BMD 0.728 (dec 0.8%)      No Known Allergies   Social History     Tobacco Use    Smoking status: Never    Smokeless tobacco: Never   Substance Use Topics    Alcohol use: Yes     Comment: Wine occasionally      Wt Readings from Last 1 Encounters:   25 60.5 kg (133 lb 4.8 oz)        Anesthesia Evaluation            ROS/MED HX  ENT/Pulmonary:     (+)           allergic rhinitis,           asthma               (-) sleep apnea   Neurologic:    (-) no seizures, no CVA and migraines   Cardiovascular:     (+)  hypertension- -   -  - -                                   (-) CHF, arrhythmias, irregular heartbeat/palpitations and dyslipidemia   METS/Exercise Tolerance:     Hematologic:       Musculoskeletal:       GI/Hepatic: Comment: right inguinal hernia    (+) GERD,                (-) liver disease   Renal/Genitourinary:    (-) renal disease   Endo:    (-) Type II DM and thyroid disease   Psychiatric/Substance Use:       Infectious Disease:       Malignancy:       Other:              Physical Exam  Airway  Mallampati: II  TM distance: >3 FB  Neck ROM: full    Cardiovascular   Rhythm: regular     Dental   (+) Completely normal teeth      Pulmonary Breath sounds clear to auscultation        Neurological   She appears awake.    Other Findings       OUTSIDE LABS:  CBC:   Lab Results   Component Value Date    WBC 5.0 2025    WBC 6.0 2025    HGB 11.7 2025    HGB 12.3 2025    HCT 34.2 (L) 2025    HCT 35.7 2025     2025     2025     BMP:   Lab  "Results   Component Value Date     05/21/2025     04/16/2025    POTASSIUM 4.0 05/21/2025    POTASSIUM 4.3 04/16/2025    CHLORIDE 103 05/21/2025    CHLORIDE 101 04/16/2025    CO2 24 05/21/2025    CO2 24 04/16/2025    BUN 14.4 05/21/2025    BUN 12.1 04/16/2025    CR 0.80 05/21/2025    CR 0.85 04/16/2025    GLC 87 05/21/2025     (H) 04/16/2025     COAGS: No results found for: \"PTT\", \"INR\", \"FIBR\"  POC: No results found for: \"BGM\", \"HCG\", \"HCGS\"  HEPATIC:   Lab Results   Component Value Date    ALBUMIN 4.6 04/16/2025    PROTTOTAL 7.1 04/16/2025    ALT 22 04/16/2025    AST 29 04/16/2025    ALKPHOS 52 04/16/2025    BILITOTAL 0.4 04/16/2025     OTHER:   Lab Results   Component Value Date    KARLA 9.2 05/21/2025    MAG 2.1 05/21/2025    TSH 1.27 02/15/2022    CRP 3.3 09/15/2016       Anesthesia Plan    ASA Status:  2      NPO Status: NPO Appropriate   Anesthesia Type: General.  Airway: oral.  Induction: intravenous.  Maintenance: Inhalation.   Techniques and Equipment:       - Monitoring Plan: standard ASA monitoring, train of four monitoring     Consents    Anesthesia Plan(s) and associated risks, benefits, and realistic alternatives discussed. Questions answered and patient/representative(s) expressed understanding.     - Discussed: anesthesiologist     - Discussed with:  Patient        - Pt is DNR/DNI Status: no DNR     Blood Consent:      - Discussed with: patient.     - Consented: consented to blood products     Postoperative Care    Pain management: plan for postoperative opioid use, multimodal analgesia.     Comments:                   Jaydon Koenig DO    I have reviewed the pertinent notes and labs in the chart from the past 30 days and (re)examined the patient.  Any updates or changes from those notes are reflected in this note.    Clinically Significant Risk Factors Present on Admission                   # Hypertension: Noted on problem list           # Overweight: Estimated body mass index " "is 25.83 kg/m  as calculated from the following:    Height as of 5/21/25: 1.53 m (5' 0.24\").    Weight as of 5/21/25: 60.5 kg (133 lb 4.8 oz).                    "

## 2025-06-09 NOTE — ANESTHESIA CARE TRANSFER NOTE
Patient: Dorie Gagnon    Procedure: Procedure(s):  robot assisted laparoscopic bilateral inguinal hernia repair with mesh       Diagnosis: Right inguinal hernia [K40.90]  Diagnosis Additional Information: No value filed.    Anesthesia Type:   General     Note:    Oropharynx: oropharynx clear of all foreign objects and spontaneously breathing  Level of Consciousness: drowsy  Oxygen Supplementation: face mask  Level of Supplemental Oxygen (L/min / FiO2): 6  Independent Airway: airway patency satisfactory and stable  Dentition: dentition unchanged  Vital Signs Stable: post-procedure vital signs reviewed and stable  Report to RN Given: handoff report given  Patient transferred to: PACU  Comments: Neuromuscular blockade reversed with sugammadex, spontaneous respirations, adequate tidal volumes, followed commands to voice, oropharynx suctioned with soft flexible catheter, extubated atraumatically, extubated with suction, airway patent after extubation.  Oxygen via facemask at 6 liters per minute to PACU. Oxygen tubing connected to wall O2 in PACU, SpO2, NiBP, and EKG monitors and alarms on and functioning, Penny Hugger warmer connected to patient gown, report on patient's clinical status given to PACU RN, RN questions answered.       Handoff Report: Identifed the Patient, Identified the Reponsible Provider, Reviewed the pertinent medical history, Discussed the surgical course, Reviewed Intra-OP anesthesia mangement and issues during anesthesia, Set expectations for post-procedure period and Allowed opportunity for questions and acknowledgement of understanding      Vitals:  Vitals Value Taken Time   BP     Temp 36.4  C (97.52  F) 06/09/25 14:17   Pulse 80 06/09/25 14:17   Resp 19 06/09/25 14:17   SpO2 99 % 06/09/25 14:17   Vitals shown include unfiled device data.    Electronically Signed By: CHRISTIAN Patterson CRNA  June 9, 2025  2:18 PM

## 2025-06-09 NOTE — DISCHARGE INSTRUCTIONS
Same Day Surgery Discharge Instructions for  Sedation and General Anesthesia     It's not unusual to feel dizzy, light-headed or faint for up to 24 hours after surgery or while taking pain medication.  If you have these symptoms: sit for a few minutes before standing and have someone assist you when you get up to walk or use the bathroom.    You should rest and relax for the next 24 hours. We recommend you make arrangements to have an adult stay with you for at least 24 hours after your discharge.  Avoid hazardous and strenuous activity.    DO NOT DRIVE any vehicle or operate mechanical equipment for 24 hours following the end of your surgery.  Even though you may feel normal, your reactions may be affected by the medication you have received.    Do not drink alcoholic beverages for 24 hours following surgery.     Slowly progress to your regular diet as you feel able. It's not unusual to feel nauseated and/or vomit after receiving anesthesia.  If you develop these symptoms, drink clear liquids (apple juice, ginger ale, broth, 7-up, etc. ) until you feel better.  If your nausea and vomiting persists for 24 hours, please notify your surgeon.      All narcotic pain medications, along with inactivity and anesthesia, can cause constipation. Drinking plenty of liquids and increasing fiber intake will help.    For any questions of a medical nature, call your surgeon.    Do not make important decisions for 24 hours.    If you had general anesthesia, you may have a sore throat for a couple of days related to the breathing tube used during surgery.  You may use Cepacol lozenges to help with this discomfort.  If it worsens or if you develop a fever, contact your surgeon.     If you feel your pain is not well managed with the pain medications prescribed by your surgeon, please contact your surgeon's office to let them know so they can address your concerns.     Today you received Toradol, an antiinflammatory medication similar  to Ibuprofen.  You should not take other antiinflammatory medication, such as Ibuprofen, Motrin, Advil, Aleve, Naprosyn, etc until 8:00 pm.        [facility]  Discharge Instructions: Post-Operative Inguinal Hernia Repair    ACTIVITY  Take frequent, short walks and increase your activity gradually.    Avoid strenuous physical activity or heavy lifting greater than 15 lbs. for 2-3 weeks.  You may climb stairs.  You may drive without restrictions when you are not using any prescription pain medication and feel comfortable in a car.  You may return to work/school when you are comfortable without any prescription pain medication.    WOUND CARE  You may remove your outer dressing or Band-Aids and shower 48 hours after the surgery.  Pat your incisions dry and leave them open to air.  Re-apply dressing (Band-Aids or gauze/tape) as needed for comfort or drainage.  You have steri-strips (looks like white tape) on your incision.  You should let these fall off on their own.  Do not soak your incisions in a tub or pool for 2 weeks.   Do not apply any lotions, creams, or ointments to your incisions.  A ridge under your incisions is normal and will gradually resolve.    DIET  Start with liquids, then gradually resume your regular diet as tolerated.   Drink plenty of fluids to stay hydrated.    PAIN  Expect some tenderness and discomfort at the incision site(s).  Use the prescribed pain medication at your discretion.  Expect gradual resolution of your pain over several days.  You may take ibuprofen with food (unless you have been told not to) or acetaminophen/Tylenol instead of or in addition to your prescribed pain medication.'  You may take Tylenol 500 - 1000 mg every 6 hours as needed for pain - do not exceed 4,000 mg of tylenol (acetaminophen) from all sources in 24 hours.  You may take Ibuprofen 400 - 600 mg every 6 hours as needed for pain - do not exceed 2,400 mg of ibuprofen from all sources in  24 hours. Do not use Ibuprofen for any longer than necessary or take if you have been told not to by another medical provider.  You may alternate Ibuprofen and Tylenol every 3 hours as needed for pain. Reserve the narcotic prescription for refractory pain.  Do not drink alcohol or drive while you are taking pain medications.  You may apply ice to your incisions in 20 minute intervals as needed for the next 48 hours.  After that time, consider switching to heat if you prefer.    EXPECTATIONS  You can expect some swelling and bruising involving the groin as well as some inner thigh numbness.  These symptoms are expected and should gradually resolve in the next few days.  You may use ice to help with the swelling.  Pain medications can cause constipation.  Limit use when possible.  Take your over the counter or prescribed stool softener/stimulant, such as Colace or Senna, 2 times a day with plenty of water.  You may take a mild over the counter laxative, such as Miralax or a suppository, as needed.  You may take 1 oz. (2 tablespoons) Milk of Magnesia the evening following surgery to encourage bowel movement.  You may discontinue these medications once you are having regular bowel movements and/or are no longer taking your narcotic pain medication.   You may have shoulder or upper back discomfort due to the gas used in surgery.  This is temporary and should resolve in 48-72 hours.  Short, frequent walks may help with this.  If you are unable to urinate for 8 hours or feel as though you are not emptying your bladder adequately, we recommend you seek care at an ER or Urgent Care facility for possible catheter placement.     FOLLOW UP  Our office will contact you in approximately 2-3 weeks to check on your progress and answer any questions you may have.  If you are doing well, you will not need to return for a follow up appointment.  If any concerns are identified over the phone, we will help you make an appointment to see  a provider.   If you have not received a phone call, have any questions or concerns, or would like to be seen, please call us at [phone] and ask to speak with our nurse.  We are located at [address] [place] [address].    CALL OUR OFFICE -[phone] IF YOU HAVE:   Chills or fever above 101 F.  Increased redness, warmth, or drainage at your incisions.  Significant bleeding.  Pain not relieved by your pain medication or rest.  Increasing pain after the first 48 hours.  Any other concerns or questions.             **If you have questions or concerns about your procedure,   call  **

## 2025-06-09 NOTE — ANESTHESIA PROCEDURE NOTES
Airway       Patient location during procedure: OR ([facility] - Operating Room or Procedural Area)       Procedure Start/Stop Times: 6/9/2025 12:02 PM  Staff -        Anesthesiologist:  Jaydon Koenig DO       CRNA: Nettie Heard APRN CRNA       Performed By: CRNA  Consent for Airway        Urgency: elective  Indications and Patient Condition       Indications for airway management: brianna-procedural       Induction type:intravenous       Mask difficulty assessment: 2 - vent by mask + OA or adjuvant +/- NMBA    Final Airway Details       Final airway type: endotracheal airway       Successful airway: ETT - single  Endotracheal Airway Details        ETT size (mm): 7.0       Cuffed: yes       Successful intubation technique: direct laryngoscopy       DL Blade Type: MAC 3       Grade View of Cords: 2       Adjucts: stylet       Measured from: gums/teeth       Secured at (cm): 19       Bite block used: None    Post intubation assessment        Placement verified by: capnometry, equal breath sounds and chest rise        Number of other approaches attempted: 0       Secured with: tape       Ease of procedure: easy       Dentition: Intact and Unchanged    Medication(s) Administered   Medication Administration Time: 6/9/2025 12:02 PM

## 2025-06-09 NOTE — OR NURSING
Meets criteria for discharge.  Discharge instructions reviewed with pt and pt's designated responsible party.  Pt label on prescription bag from pharmacy matched to pt's wristband. Pharmacy bag opened with 2 prescriptions inside. Medications were reviewed to match pt wristband while pt and significant other agreed with identification. Prescriptions placed back in pharmacy bag resealed with tape and given to Armond-spouse per pt request.

## 2025-06-09 NOTE — OR NURSING
Patient alert and oriented x 4.  VSS. Patient voided prior to discharge.  Discharge instructions read through with Patient and Family member (Armond-spouse).  Medications given to family member.  IV removed.  Patient escorted to door in a wheelchair with staff.

## 2025-06-09 NOTE — ANESTHESIA POSTPROCEDURE EVALUATION
Patient: Dorie Gagnon    Procedure: Procedure(s):  robot assisted laparoscopic bilateral inguinal hernia repair with mesh       Anesthesia Type:  General    Note:  Disposition: Inpatient   Postop Pain Control: Uneventful            Sign Out: Well controlled pain   PONV: No   Neuro/Psych: Uneventful            Sign Out: Acceptable/Baseline neuro status   Airway/Respiratory: Uneventful            Sign Out: Acceptable/Baseline resp. status   CV/Hemodynamics: Uneventful            Sign Out: Acceptable CV status; No obvious hypovolemia; No obvious fluid overload   Other NRE: NONE   DID A NON-ROUTINE EVENT OCCUR? No           Last vitals:  Vitals Value Taken Time   /71 06/09/25 15:30   Temp 36.4  C (97.5  F) 06/09/25 15:30   Pulse 69 06/09/25 15:30   Resp 11 06/09/25 15:30   SpO2 95 % 06/09/25 15:30       Electronically Signed By: Jaydon Koenig DO  June 9, 2025  4:41 PM

## 2025-06-10 ENCOUNTER — TELEPHONE (OUTPATIENT)
Dept: SURGERY | Facility: CLINIC | Age: 75
End: 2025-06-10
Payer: COMMERCIAL

## 2025-06-10 NOTE — TELEPHONE ENCOUNTER
Name of caller: Patient    Reason for Call:  Medication Questions  Would like to review her medications, and does have a few questions about the meds.    Surgeon:  Wyatt Washington MD    Recent Surgery:  Yes.    If yes, when & what type:  6/9/2025      robot assisted laparoscopic bilateral inguinal hernia repair with mesh     Best phone number to reach pt at is: [phone]    Ok to leave a message with medical info? Yes.

## 2025-06-10 NOTE — TELEPHONE ENCOUNTER
Procedure performed: Robotic repair of bilateral inguinal hernias with placement of bilateral preperitoneal mesh.     Date: 6/9/25    Surgeon: Felix    Patient calling to discuss pain medication regimen.      She has been taking oxycodone, every 4 hours. 1 tablet because they are too small to cut in half.  Also taking tylenol every 4 hours. Confused regarding what she is supposed to be doing.    Informed her that I will send her a [employer] message with medication regimen recommendations, and she should add in ibuprofen.    She is taking colace twice daily, also takes miralax, and metamucil. Suggested she increase miralax to twice daily. Hold metamucil, unless she is drinking at least 60 ounces of water daily    She verbalized understanding and knows to call PRN with any questions or concerns    [employer] message sent    Bryanna Avila RN-BSN

## 2025-06-11 ENCOUNTER — TELEPHONE (OUTPATIENT)
Dept: SURGERY | Facility: CLINIC | Age: 75
End: 2025-06-11
Payer: COMMERCIAL

## 2025-06-11 DIAGNOSIS — G89.18 POSTOPERATIVE PAIN: ICD-10-CM

## 2025-06-11 RX ORDER — OXYCODONE HYDROCHLORIDE 5 MG/1
2.5-5 TABLET ORAL EVERY 4 HOURS PRN
Qty: 10 TABLET | Refills: 0 | Status: SHIPPED | OUTPATIENT
Start: 2025-06-11

## 2025-06-11 NOTE — TELEPHONE ENCOUNTER
Patient had a lap bih repair with mesh 6/9/25 CY.  Patient would liek a refill of Oycodone,  Patient uses [facility] in [place] on [address]    Please call when approved    Phone: [phone]   Message ok

## 2025-06-11 NOTE — TELEPHONE ENCOUNTER
Procedure: Robotic BI    Date: 6/9/25    Surgeon: Felix    Patient calling for refill of oxycodone as discussed during our phone call at the end of the the day yesterday    Informed her refill has been sent to her pharmacy    Patient reports she is feeling better, every day.  Had a bowel movement today, which she is happy about    Patient and her  wondering how long pain will last. Informed them that it is different for everyone, but expect gradual improvement over the course of the first couple of weeks    All questions answered    They will call SMITH Avila, RN-BSN

## 2025-06-16 DIAGNOSIS — J45.901 BRONCHITIS, ALLERGIC, UNSPECIFIED ASTHMA SEVERITY, WITH ACUTE EXACERBATION: ICD-10-CM

## 2025-06-16 RX ORDER — ALBUTEROL SULFATE 90 UG/1
2 INHALANT RESPIRATORY (INHALATION) EVERY 6 HOURS PRN
Qty: 18 G | Refills: 0 | Status: SHIPPED | OUTPATIENT
Start: 2025-06-16

## 2025-06-18 ENCOUNTER — TELEPHONE (OUTPATIENT)
Dept: SURGERY | Facility: CLINIC | Age: 75
End: 2025-06-18
Payer: COMMERCIAL

## 2025-06-18 NOTE — TELEPHONE ENCOUNTER
[facility]  Post op call note  June 18, 2025       Dorie Gagnon was called for an update regarding her recovery.  There was no answer and a message was left encouraging her to call us back with any questions, concerns, or to provide an update.  In the voicemail I did discuss that she should avoid heavy lifting until 3 weeks out from surgery and she may remove steri strips and submerge incisions 2 weeks out from surgery.          Irena Panda PA-C  [facility]  [phone]

## 2025-06-18 NOTE — TELEPHONE ENCOUNTER
Procedure Performed: robotic repair of bilateral inguinal hernias with placement of bilateral preperitoneal mesh    Date: 6/9/25    Surgeon: Felix    Patient wondering how long she should continue taking tylenol and ibuprofen. She feels that she has been told in the past by her PCP that she should not take ibuprofen, but does not remember why.    Informed her that she does not need to continue taking either tylenol or ibuprofen on a scheduled basis at this time. She can use as tylenol as needed. Did suggest that she continue tylenol on a scheduled basis, however, today and tomorrow since she is stopping ibuprofen and then taper off as she feels able. She agreed.    Patient wondering if she needs to have clinic post op visit? Informed her that there is no need for a clinic post op visit, unless she is having concerns. However, if she wants to be seen in person for post op visit (vs phone call), I am happy to help scheduled her.    All questions were answered and patient will call SMITH Avila RN-BSN

## 2025-06-18 NOTE — TELEPHONE ENCOUNTER
Patient had a lap bih repair 6/9/25 CY. Patient has questions about her medications, she is taking Tylenol and Ibuprofen, wondering if she can switch to just Tylenol. Also wants to know how long she should be taking this    Please call    Phone: [phone]   Message ok

## 2025-06-24 ENCOUNTER — TELEPHONE (OUTPATIENT)
Dept: SURGERY | Facility: CLINIC | Age: 75
End: 2025-06-24
Payer: COMMERCIAL

## 2025-06-24 NOTE — TELEPHONE ENCOUNTER
Name of caller: Patient    Reason for Call:  Call Back  Dorie wondering if she is ok to drive? Are there any restrictions?  Also has a few other questions to discuss.   Ok to get call back tomorrow morning.     Surgeon:  Wyatt Washington MD    Recent Surgery:  Yes.    If yes, when & what type:  6/9/25    robot assisted laparoscopic bilateral inguinal hernia repair with mesh       Best phone number to reach pt at is: [phone]    Ok to leave a message with medical info? Yes.

## 2025-06-25 NOTE — TELEPHONE ENCOUNTER
Procedure: Robotic repair of bilateral inguinal hernias with placement of bilateral preperitoneal mesh.     Date: 6/9/25    Surgeon: Felix    Patient wondering if it is okay to drive? Her  has an appointment tomorrow and needs a  for it. Informed her that yes, it is okay to drive, as long as she is not using the oxycodone. She reports she is only using tylenol at this point.    Discussed remaining on restrictions until July 1. At that point, she is okay to gradually return to normal activity.    Patient does have some ongoing pain/soreness. Informed her that it can take 6-8 weeks to recover/heal from these surgeries and even longer for muscles to regain strength and flexibility.  If at any point she would like to be seen for in person post op visit, Dr. Washington would be happy to see her.    She would like to be seen.  Scheduled her to see Dr. Washington on July 2. She will call and cancel if she feels symptoms are improving and she no longer needs appointment.    She will call PRN in the interim.    Bryanna Avila, BILL-BSN

## 2025-07-02 ENCOUNTER — OFFICE VISIT (OUTPATIENT)
Dept: SURGERY | Facility: CLINIC | Age: 75
End: 2025-07-02
Payer: COMMERCIAL

## 2025-07-02 DIAGNOSIS — G89.18 POSTOPERATIVE PAIN: ICD-10-CM

## 2025-07-02 DIAGNOSIS — K40.90 RIGHT INGUINAL HERNIA: Primary | ICD-10-CM

## 2025-07-02 NOTE — LETTER
3July 3, 2025          Lori Escamilla MD  [address] [place] [address]      RE:   Dorie Gagnon 1950      Dear Colleague,    Thank you for referring your patient, Dorie Gagnon, to [facility]. Please see a copy of my visit note below.    Dorie Gagnon is a 75 year old who is following up in clinic with her  2.5 weeks s/p Robotic Bilateral Inguinal Hernia Repair with mesh on 6/9/2025.     S: Is doing well. Had some soreness and intermittent shooting pain in her left lower quadrant but it has been less often now and is very short lived. Otherwise she's eating well and having normal bms.     Abdomen: incisions are clean, dry, and healing well.         A/P:  Dorie Gagnon is recovering from a Robotic Bilateral Inguinal Hernia Repair. I advised her to slowly return to regular activity. I expect she will make a complete recovery without issues. LLQ shooting pain is likely neuropathic. This should resolve overnight. If she continues to have shooting LLQ pain, can consider getting started on low dose gabapentin. Patient will defer for now. She does not need to follow up unless she continues to have LLQ pain.    Again, thank you for allowing me to participate in the care of your patient.      Sincerely,      Wyatt Washington MD

## 2025-07-03 NOTE — PROGRESS NOTES
General Surgery Post-op Follow up Clinic Note:      Dorie Gagnon is a 75 year old who is following up in clinic with her  2.5 weeks s/p Robotic Bilateral Inguinal Hernia Repair with mesh on 6/9/2025.    S: Is doing well. Had some soreness and intermittent shooting pain in her left lower quadrant but it has been less often now and is very short lived. Otherwise she's eating well and having normal bms.    Abdomen: incisions are clean, dry, and healing well.       A/P:  Dorie Gagnon is recovering from a Robotic Bilateral Inguinal Hernia Repair. I advised her to slowly return to regular activity. I expect she will make a complete recovery without issues. LLQ shooting pain is likely neuropathic. This should resolve overnight. If she continues to have shooting LLQ pain, can consider getting started on low dose gabapentin. Patient will defer for now. She does not need to follow up unless she continues to have LLQ pain.    Thank you for the opportunity to help in their care.    Wyatt Washington MD   [facility]  [phone]    Please route or send letter to:  Primary Care Provider (PCP) and Referring Provider

## 2025-07-08 DIAGNOSIS — J45.901 BRONCHITIS, ALLERGIC, UNSPECIFIED ASTHMA SEVERITY, WITH ACUTE EXACERBATION: ICD-10-CM

## 2025-07-08 RX ORDER — ALBUTEROL SULFATE 90 UG/1
2 INHALANT RESPIRATORY (INHALATION) EVERY 6 HOURS PRN
Qty: 6.7 G | Refills: 0 | Status: SHIPPED | OUTPATIENT
Start: 2025-07-08

## 2025-07-24 ENCOUNTER — HOSPITAL ENCOUNTER (OUTPATIENT)
Dept: CARDIOLOGY | Facility: CLINIC | Age: 75
Discharge: HOME | End: 2025-07-24
Attending: PHYSICIAN ASSISTANT
Payer: COMMERCIAL

## 2025-07-24 DIAGNOSIS — I42.1 HYPERTROPHIC OBSTRUCTIVE CARDIOMYOPATHY (H): ICD-10-CM

## 2025-07-24 DIAGNOSIS — I49.3 PVC (PREMATURE VENTRICULAR CONTRACTION): ICD-10-CM

## 2025-07-24 LAB — LVEF ECHO: NORMAL

## 2025-07-24 PROCEDURE — 93306 TTE W/DOPPLER COMPLETE: CPT

## 2025-08-05 DIAGNOSIS — J45.901 BRONCHITIS, ALLERGIC, UNSPECIFIED ASTHMA SEVERITY, WITH ACUTE EXACERBATION: ICD-10-CM

## 2025-08-06 RX ORDER — ALBUTEROL SULFATE 90 UG/1
2 INHALANT RESPIRATORY (INHALATION) EVERY 6 HOURS PRN
Qty: 18 G | Refills: 1 | Status: SHIPPED | OUTPATIENT
Start: 2025-08-06

## 2025-08-09 DIAGNOSIS — I10 ESSENTIAL HYPERTENSION: ICD-10-CM

## 2025-08-09 DIAGNOSIS — I49.3 PVC'S (PREMATURE VENTRICULAR CONTRACTIONS): ICD-10-CM

## 2025-08-09 DIAGNOSIS — R93.1 ABNORMAL ECHOCARDIOGRAM: ICD-10-CM

## 2025-08-11 RX ORDER — ATENOLOL 25 MG/1
25 TABLET ORAL DAILY
Qty: 90 TABLET | Refills: 1 | Status: SHIPPED | OUTPATIENT
Start: 2025-08-11

## (undated) DEVICE — DRAPE IOBAN INCISE 23X17" 6650EZ

## (undated) DEVICE — ENDO TROCAR FIRST ENTRY KII FIOS Z-THRD 05X100MM CTF03

## (undated) DEVICE — LUBRICANT INST ELECTROLUBE EL101

## (undated) DEVICE — PIN GUARD 10-1-001 101001PBX

## (undated) DEVICE — SU STRATAFIX PDS PLUS 2-0 SPIRAL SH 23CM SXPP1B433

## (undated) DEVICE — SU MONOCRYL 4-0 PS-2 18" UND Y496G

## (undated) DEVICE — ESU GROUND PAD UNIVERSAL W/O CORD

## (undated) DEVICE — PACK LAP CHOLE SLC15LCFSD

## (undated) DEVICE — CAST PADDING 4" COTTON WEBRIL UNSTERILE 9084

## (undated) DEVICE — DAVINCI XI SEAL UNIVERSAL 5-12MM 470500

## (undated) DEVICE — DAVINCI XI FCP BIPOLAR FENESTRATED 470205

## (undated) DEVICE — CLEANER INST PRE-KLENZ SOAK SHIELD TUBE 6 ML MEDIUM 2D66J4

## (undated) DEVICE — BNDG ROLLER GAUZE CONFORM 2"X4YD 41-52

## (undated) DEVICE — PREP SKIN SCRUB TRAY 4461A

## (undated) DEVICE — GLOVE PROTEGRITY MICRO 8.5 LATEX

## (undated) DEVICE — DAVINCI XI MONOPOLAR SCISSORS HOT SHEARS 8MM 470179

## (undated) DEVICE — DAVINCI XI DRAPE ARM 470015

## (undated) DEVICE — GOWN XXLG REINFORCED 9071EL

## (undated) DEVICE — DRAPE SHEET REV FOLD 3/4 9349

## (undated) DEVICE — SU PROLENE 3-0 PS-2 18" 8687H

## (undated) DEVICE — Device

## (undated) DEVICE — SOL NACL 0.9% INJ 1000ML BAG 2B1324X

## (undated) DEVICE — BLADE SAW SAGITTAL MICROAIRE  1200-005

## (undated) DEVICE — DRSG XEROFORM 1X8"

## (undated) DEVICE — ANTIFOG SOLUTION SEE SHARP 150M TROCAR SWABS 30978 (COI)

## (undated) DEVICE — SOL WATER IRRIG 1000ML BOTTLE 2F7114

## (undated) DEVICE — DRSG KERLIX FLUFFS X5

## (undated) DEVICE — GLOVE BIOGEL PI MICRO SZ 6.5 48565

## (undated) DEVICE — PREP CHLORAPREP 26ML TINTED HI-LITE ORANGE 930815

## (undated) DEVICE — SU VICRYL 2-0 X-1 27" UND J459H

## (undated) DEVICE — DRSG GAUZE 4X4" 3033

## (undated) DEVICE — LINEN TOWEL PACK X5 5464

## (undated) DEVICE — BAG DECANTER STERILE WHITE DYNJDEC09

## (undated) DEVICE — SU DERMABOND ADVANCED .7ML DNX12

## (undated) DEVICE — CLIP ETHICON LIGACLIP LG YELLOW LT400

## (undated) DEVICE — DAVINCI XI DRAPE COLUMN 470341

## (undated) DEVICE — SU VICRYL 3-0 SH 27" J316H

## (undated) DEVICE — BLADE SAW SAGITTAL MICROAIRE 1200-006

## (undated) DEVICE — GLOVE BIOGEL PI MICRO INDICATOR UNDERGLOVE SZ 6.5 48965

## (undated) DEVICE — TOURNIQUET CUFF 30" STERILE

## (undated) DEVICE — SYR 10ML FINGER CONTROL W/O NDL 309695

## (undated) DEVICE — LIGHT HANDLE X2

## (undated) DEVICE — PACK EXTREMITY SOP15EXFSD

## (undated) DEVICE — CAST PADDING 4" UNSTERILE 9044

## (undated) DEVICE — DAVINCI HOT SHEARS TIP COVER  400180

## (undated) DEVICE — DAVINCI XI OBTURATOR BLADELESS 8MM 470359

## (undated) DEVICE — SU STRATAFIX PDS PLUS 3-0 SPIRAL SH 15CM SXPP1B420

## (undated) DEVICE — IMM LIMB ELEVATOR DC40-0203

## (undated) RX ORDER — ONDANSETRON 2 MG/ML
INJECTION INTRAMUSCULAR; INTRAVENOUS
Status: DISPENSED
Start: 2019-04-24

## (undated) RX ORDER — PROPOFOL 10 MG/ML
INJECTION, EMULSION INTRAVENOUS
Status: DISPENSED
Start: 2019-04-24

## (undated) RX ORDER — FENTANYL CITRATE 50 UG/ML
INJECTION, SOLUTION INTRAMUSCULAR; INTRAVENOUS
Status: DISPENSED
Start: 2019-04-24

## (undated) RX ORDER — CEFAZOLIN SODIUM 1 G/3ML
INJECTION, POWDER, FOR SOLUTION INTRAMUSCULAR; INTRAVENOUS
Status: DISPENSED
Start: 2019-04-24

## (undated) RX ORDER — FENTANYL CITRATE 50 UG/ML
INJECTION, SOLUTION INTRAMUSCULAR; INTRAVENOUS
Status: DISPENSED
Start: 2021-02-10

## (undated) RX ORDER — FENTANYL CITRATE 50 UG/ML
INJECTION, SOLUTION INTRAMUSCULAR; INTRAVENOUS
Status: DISPENSED
Start: 2025-06-09

## (undated) RX ORDER — BUPIVACAINE HYDROCHLORIDE AND EPINEPHRINE 5; 5 MG/ML; UG/ML
INJECTION, SOLUTION EPIDURAL; INTRACAUDAL; PERINEURAL
Status: DISPENSED
Start: 2025-06-09

## (undated) RX ORDER — KETOROLAC TROMETHAMINE 30 MG/ML
INJECTION, SOLUTION INTRAMUSCULAR; INTRAVENOUS
Status: DISPENSED
Start: 2019-04-24

## (undated) RX ORDER — HYDROMORPHONE HYDROCHLORIDE 1 MG/ML
INJECTION, SOLUTION INTRAMUSCULAR; INTRAVENOUS; SUBCUTANEOUS
Status: DISPENSED
Start: 2025-06-09

## (undated) RX ORDER — LIDOCAINE HYDROCHLORIDE 20 MG/ML
INJECTION, SOLUTION EPIDURAL; INFILTRATION; INTRACAUDAL; PERINEURAL
Status: DISPENSED
Start: 2019-04-24

## (undated) RX ORDER — CEFAZOLIN SODIUM 2 G/100ML
INJECTION, SOLUTION INTRAVENOUS
Status: DISPENSED
Start: 2019-04-24

## (undated) RX ORDER — OXYCODONE HYDROCHLORIDE 5 MG/1
TABLET ORAL
Status: DISPENSED
Start: 2025-06-09

## (undated) RX ORDER — CEFAZOLIN SODIUM/WATER 2 G/20 ML
SYRINGE (ML) INTRAVENOUS
Status: DISPENSED
Start: 2025-06-09

## (undated) RX ORDER — KETOROLAC TROMETHAMINE 30 MG/ML
INJECTION, SOLUTION INTRAMUSCULAR; INTRAVENOUS
Status: DISPENSED
Start: 2025-06-09

## (undated) RX ORDER — HYDROMORPHONE HCL IN WATER/PF 6 MG/30 ML
PATIENT CONTROLLED ANALGESIA SYRINGE INTRAVENOUS
Status: DISPENSED
Start: 2025-06-09

## (undated) RX ORDER — DEXAMETHASONE SODIUM PHOSPHATE 4 MG/ML
INJECTION, SOLUTION INTRA-ARTICULAR; INTRALESIONAL; INTRAMUSCULAR; INTRAVENOUS; SOFT TISSUE
Status: DISPENSED
Start: 2019-04-24

## (undated) RX ORDER — ACETAMINOPHEN 325 MG/1
TABLET ORAL
Status: DISPENSED
Start: 2025-06-09